# Patient Record
Sex: MALE | Race: ASIAN | NOT HISPANIC OR LATINO | Employment: OTHER | URBAN - METROPOLITAN AREA
[De-identification: names, ages, dates, MRNs, and addresses within clinical notes are randomized per-mention and may not be internally consistent; named-entity substitution may affect disease eponyms.]

---

## 2018-01-03 ENCOUNTER — HOSPITAL ENCOUNTER (INPATIENT)
Facility: HOSPITAL | Age: 78
LOS: 1 days | Discharge: HOME HEALTH | DRG: 308 | End: 2018-01-05
Attending: EMERGENCY MEDICINE | Admitting: HOSPITALIST
Payer: MEDICARE

## 2018-01-03 DIAGNOSIS — J44.1 COPD EXACERBATION: ICD-10-CM

## 2018-01-03 DIAGNOSIS — J45.41 ASTHMA EXACERBATION, NON-ALLERGIC, MODERATE PERSISTENT: ICD-10-CM

## 2018-01-03 DIAGNOSIS — R07.9 CHEST PAIN: ICD-10-CM

## 2018-01-03 DIAGNOSIS — I48.91 ATRIAL FIBRILLATION AND FLUTTER: ICD-10-CM

## 2018-01-03 DIAGNOSIS — I48.91 ATRIAL FIBRILLATION WITH RAPID VENTRICULAR RESPONSE: ICD-10-CM

## 2018-01-03 DIAGNOSIS — R06.02 SOB (SHORTNESS OF BREATH): ICD-10-CM

## 2018-01-03 DIAGNOSIS — J96.90 RESPIRATORY FAILURE: ICD-10-CM

## 2018-01-03 DIAGNOSIS — I48.92 ATRIAL FIBRILLATION AND FLUTTER: ICD-10-CM

## 2018-01-03 DIAGNOSIS — R09.02 HYPOXIA: ICD-10-CM

## 2018-01-03 DIAGNOSIS — J20.9 ACUTE BRONCHITIS, UNSPECIFIED ORGANISM: Primary | ICD-10-CM

## 2018-01-03 LAB
ALBUMIN SERPL BCP-MCNC: 3.1 G/DL
ALP SERPL-CCNC: 100 U/L
ALT SERPL W/O P-5'-P-CCNC: 15 U/L
ANION GAP SERPL CALC-SCNC: 12 MMOL/L
AST SERPL-CCNC: 24 U/L
BASOPHILS # BLD AUTO: 0.01 K/UL
BASOPHILS NFR BLD: 0.1 %
BILIRUB SERPL-MCNC: 0.4 MG/DL
BUN SERPL-MCNC: 25 MG/DL
CALCIUM SERPL-MCNC: 8.8 MG/DL
CHLORIDE SERPL-SCNC: 109 MMOL/L
CK MB SERPL-MCNC: 1.2 NG/ML
CK MB SERPL-RTO: 2.7 %
CK SERPL-CCNC: 44 U/L
CK SERPL-CCNC: 44 U/L
CO2 SERPL-SCNC: 24 MMOL/L
CREAT SERPL-MCNC: 1.5 MG/DL
DIFFERENTIAL METHOD: ABNORMAL
EOSINOPHIL # BLD AUTO: 0.1 K/UL
EOSINOPHIL NFR BLD: 0.9 %
ERYTHROCYTE [DISTWIDTH] IN BLOOD BY AUTOMATED COUNT: 13 %
EST. GFR  (AFRICAN AMERICAN): 52 ML/MIN/1.73 M^2
EST. GFR  (NON AFRICAN AMERICAN): 45 ML/MIN/1.73 M^2
GLUCOSE SERPL-MCNC: 100 MG/DL
HCT VFR BLD AUTO: 40.6 %
HGB BLD-MCNC: 13.6 G/DL
LYMPHOCYTES # BLD AUTO: 1.9 K/UL
LYMPHOCYTES NFR BLD: 27.8 %
MAGNESIUM SERPL-MCNC: 1.9 MG/DL
MCH RBC QN AUTO: 30.4 PG
MCHC RBC AUTO-ENTMCNC: 33.5 G/DL
MCV RBC AUTO: 91 FL
MONOCYTES # BLD AUTO: 1 K/UL
MONOCYTES NFR BLD: 13.6 %
NEUTROPHILS # BLD AUTO: 4 K/UL
NEUTROPHILS NFR BLD: 57.6 %
PLATELET # BLD AUTO: 179 K/UL
PMV BLD AUTO: 9.7 FL
POTASSIUM SERPL-SCNC: 4.3 MMOL/L
PROT SERPL-MCNC: 7.3 G/DL
RBC # BLD AUTO: 4.47 M/UL
SODIUM SERPL-SCNC: 145 MMOL/L
TROPONIN I SERPL DL<=0.01 NG/ML-MCNC: <0.006 NG/ML
WBC # BLD AUTO: 6.97 K/UL

## 2018-01-03 PROCEDURE — 94640 AIRWAY INHALATION TREATMENT: CPT

## 2018-01-03 PROCEDURE — 82553 CREATINE MB FRACTION: CPT

## 2018-01-03 PROCEDURE — 25000242 PHARM REV CODE 250 ALT 637 W/ HCPCS: Performed by: EMERGENCY MEDICINE

## 2018-01-03 PROCEDURE — 96365 THER/PROPH/DIAG IV INF INIT: CPT

## 2018-01-03 PROCEDURE — 85025 COMPLETE CBC W/AUTO DIFF WBC: CPT

## 2018-01-03 PROCEDURE — 80053 COMPREHEN METABOLIC PANEL: CPT

## 2018-01-03 PROCEDURE — 96366 THER/PROPH/DIAG IV INF ADDON: CPT

## 2018-01-03 PROCEDURE — 93005 ELECTROCARDIOGRAM TRACING: CPT

## 2018-01-03 PROCEDURE — 83735 ASSAY OF MAGNESIUM: CPT

## 2018-01-03 PROCEDURE — 84484 ASSAY OF TROPONIN QUANT: CPT

## 2018-01-03 PROCEDURE — 96375 TX/PRO/DX INJ NEW DRUG ADDON: CPT

## 2018-01-03 PROCEDURE — 99291 CRITICAL CARE FIRST HOUR: CPT | Mod: 25

## 2018-01-03 PROCEDURE — 25000003 PHARM REV CODE 250: Performed by: EMERGENCY MEDICINE

## 2018-01-03 PROCEDURE — 93010 ELECTROCARDIOGRAM REPORT: CPT | Mod: ,,, | Performed by: INTERNAL MEDICINE

## 2018-01-03 PROCEDURE — 63600175 PHARM REV CODE 636 W HCPCS: Performed by: EMERGENCY MEDICINE

## 2018-01-03 PROCEDURE — 96361 HYDRATE IV INFUSION ADD-ON: CPT

## 2018-01-03 PROCEDURE — 96376 TX/PRO/DX INJ SAME DRUG ADON: CPT

## 2018-01-03 PROCEDURE — 96367 TX/PROPH/DG ADDL SEQ IV INF: CPT | Mod: 59

## 2018-01-03 RX ORDER — HYDROCODONE POLISTIREX AND CHLORPHENIRAMINE POLISTIREX 10; 8 MG/5ML; MG/5ML
5 SUSPENSION, EXTENDED RELEASE ORAL EVERY 12 HOURS PRN
COMMUNITY
End: 2019-05-07

## 2018-01-03 RX ORDER — IPRATROPIUM BROMIDE AND ALBUTEROL SULFATE 2.5; .5 MG/3ML; MG/3ML
3 SOLUTION RESPIRATORY (INHALATION)
Status: DISCONTINUED | OUTPATIENT
Start: 2018-01-03 | End: 2018-01-03

## 2018-01-03 RX ORDER — MELOXICAM 7.5 MG/1
7.5 TABLET ORAL DAILY
Status: ON HOLD | COMMUNITY
End: 2018-01-05 | Stop reason: HOSPADM

## 2018-01-03 RX ORDER — IPRATROPIUM BROMIDE AND ALBUTEROL SULFATE 2.5; .5 MG/3ML; MG/3ML
3 SOLUTION RESPIRATORY (INHALATION)
Status: COMPLETED | OUTPATIENT
Start: 2018-01-03 | End: 2018-01-03

## 2018-01-03 RX ORDER — DILTIAZEM HYDROCHLORIDE 5 MG/ML
10 INJECTION INTRAVENOUS
Status: COMPLETED | OUTPATIENT
Start: 2018-01-04 | End: 2018-01-03

## 2018-01-03 RX ORDER — METHYLPREDNISOLONE SOD SUCC 125 MG
125 VIAL (EA) INJECTION
Status: COMPLETED | OUTPATIENT
Start: 2018-01-03 | End: 2018-01-03

## 2018-01-03 RX ADMIN — DILTIAZEM HYDROCHLORIDE 10 MG: 5 INJECTION INTRAVENOUS at 11:01

## 2018-01-03 RX ADMIN — IPRATROPIUM BROMIDE AND ALBUTEROL SULFATE 3 ML: .5; 3 SOLUTION RESPIRATORY (INHALATION) at 09:01

## 2018-01-03 RX ADMIN — IPRATROPIUM BROMIDE AND ALBUTEROL SULFATE 3 ML: .5; 3 SOLUTION RESPIRATORY (INHALATION) at 10:01

## 2018-01-03 RX ADMIN — METHYLPREDNISOLONE SODIUM SUCCINATE 125 MG: 125 INJECTION, POWDER, FOR SOLUTION INTRAMUSCULAR; INTRAVENOUS at 11:01

## 2018-01-03 RX ADMIN — SODIUM CHLORIDE 1000 ML: 0.9 INJECTION, SOLUTION INTRAVENOUS at 11:01

## 2018-01-04 PROBLEM — J44.1 COPD EXACERBATION: Status: ACTIVE | Noted: 2018-01-04

## 2018-01-04 PROBLEM — I48.92 PAROXYSMAL ATRIAL FLUTTER: Status: ACTIVE | Noted: 2018-01-04

## 2018-01-04 PROBLEM — J45.41 ASTHMA EXACERBATION, NON-ALLERGIC, MODERATE PERSISTENT: Status: ACTIVE | Noted: 2018-01-04

## 2018-01-04 PROBLEM — J96.90 RESPIRATORY FAILURE: Status: ACTIVE | Noted: 2018-01-04

## 2018-01-04 PROBLEM — N17.9 ACUTE RENAL FAILURE: Status: ACTIVE | Noted: 2018-01-04

## 2018-01-04 PROBLEM — J20.9 ACUTE BRONCHITIS: Status: ACTIVE | Noted: 2018-01-04

## 2018-01-04 LAB
ALBUMIN SERPL BCP-MCNC: 2.8 G/DL
ALLENS TEST: ABNORMAL
ANION GAP SERPL CALC-SCNC: 11 MMOL/L
BASOPHILS # BLD AUTO: 0 K/UL
BASOPHILS NFR BLD: 0 %
BILIRUB UR QL STRIP: NEGATIVE
BUN SERPL-MCNC: 23 MG/DL
CALCIUM SERPL-MCNC: 8.4 MG/DL
CHLORIDE SERPL-SCNC: 110 MMOL/L
CLARITY UR: CLEAR
CO2 SERPL-SCNC: 21 MMOL/L
COLOR UR: YELLOW
CREAT SERPL-MCNC: 1.5 MG/DL
DELSYS: ABNORMAL
DIASTOLIC DYSFUNCTION: NO
DIFFERENTIAL METHOD: ABNORMAL
EOSINOPHIL # BLD AUTO: 0 K/UL
EOSINOPHIL NFR BLD: 0 %
EP: 8
ERYTHROCYTE [DISTWIDTH] IN BLOOD BY AUTOMATED COUNT: 13 %
ERYTHROCYTE [SEDIMENTATION RATE] IN BLOOD BY WESTERGREN METHOD: 14 MM/H
EST. GFR  (AFRICAN AMERICAN): 52 ML/MIN/1.73 M^2
EST. GFR  (NON AFRICAN AMERICAN): 45 ML/MIN/1.73 M^2
ESTIMATED AVG GLUCOSE: 105 MG/DL
FIO2: 50
GLOBAL PERICARDIAL EFFUSION: NORMAL
GLUCOSE SERPL-MCNC: 180 MG/DL
GLUCOSE UR QL STRIP: NEGATIVE
HBA1C MFR BLD HPLC: 5.3 %
HCO3 UR-SCNC: 24.3 MMOL/L (ref 24–28)
HCT VFR BLD AUTO: 38.4 %
HGB BLD-MCNC: 12.8 G/DL
HGB UR QL STRIP: NEGATIVE
INR PPP: 1
IP: 16
KETONES UR QL STRIP: NEGATIVE
LEUKOCYTE ESTERASE UR QL STRIP: NEGATIVE
LYMPHOCYTES # BLD AUTO: 0.4 K/UL
LYMPHOCYTES NFR BLD: 6.8 %
MAGNESIUM SERPL-MCNC: 2.4 MG/DL
MCH RBC QN AUTO: 30.2 PG
MCHC RBC AUTO-ENTMCNC: 33.3 G/DL
MCV RBC AUTO: 91 FL
MITRAL VALVE REGURGITATION: NORMAL
MODE: ABNORMAL
MONOCYTES # BLD AUTO: 0.1 K/UL
MONOCYTES NFR BLD: 1 %
NEUTROPHILS # BLD AUTO: 5.4 K/UL
NEUTROPHILS NFR BLD: 92.2 %
NITRITE UR QL STRIP: NEGATIVE
PCO2 BLDA: 44.5 MMHG (ref 35–45)
PH SMN: 7.34 [PH] (ref 7.35–7.45)
PH UR STRIP: 6 [PH] (ref 5–8)
PHOSPHATE SERPL-MCNC: 2.3 MG/DL
PHOSPHATE SERPL-MCNC: 2.3 MG/DL
PLATELET # BLD AUTO: 155 K/UL
PMV BLD AUTO: 9.7 FL
PO2 BLDA: 189 MMHG (ref 80–100)
POC BE: -1 MMOL/L
POC SATURATED O2: 100 % (ref 95–100)
POCT GLUCOSE: 210 MG/DL (ref 70–110)
POTASSIUM SERPL-SCNC: 4.5 MMOL/L
PROT UR QL STRIP: NEGATIVE
PROTHROMBIN TIME: 10.7 SEC
RBC # BLD AUTO: 4.24 M/UL
RETIRED EF AND QEF - SEE NOTES: 60 (ref 55–65)
SAMPLE: ABNORMAL
SITE: ABNORMAL
SODIUM SERPL-SCNC: 142 MMOL/L
SP GR UR STRIP: 1.01 (ref 1–1.03)
T4 FREE SERPL-MCNC: 1.12 NG/DL
TSH SERPL DL<=0.005 MIU/L-ACNC: 0.08 UIU/ML
URN SPEC COLLECT METH UR: NORMAL
UROBILINOGEN UR STRIP-ACNC: NEGATIVE EU/DL
WBC # BLD AUTO: 5.89 K/UL

## 2018-01-04 PROCEDURE — 27000190 HC CPAP FULL FACE MASK W/VALVE

## 2018-01-04 PROCEDURE — 84443 ASSAY THYROID STIM HORMONE: CPT

## 2018-01-04 PROCEDURE — 36600 WITHDRAWAL OF ARTERIAL BLOOD: CPT

## 2018-01-04 PROCEDURE — 63600175 PHARM REV CODE 636 W HCPCS: Performed by: EMERGENCY MEDICINE

## 2018-01-04 PROCEDURE — 63600175 PHARM REV CODE 636 W HCPCS: Performed by: HOSPITALIST

## 2018-01-04 PROCEDURE — 27000221 HC OXYGEN, UP TO 24 HOURS

## 2018-01-04 PROCEDURE — 94660 CPAP INITIATION&MGMT: CPT

## 2018-01-04 PROCEDURE — 99223 1ST HOSP IP/OBS HIGH 75: CPT | Mod: ,,, | Performed by: INTERNAL MEDICINE

## 2018-01-04 PROCEDURE — 83735 ASSAY OF MAGNESIUM: CPT

## 2018-01-04 PROCEDURE — 85610 PROTHROMBIN TIME: CPT

## 2018-01-04 PROCEDURE — 25000003 PHARM REV CODE 250: Performed by: NURSE PRACTITIONER

## 2018-01-04 PROCEDURE — 21400001 HC TELEMETRY ROOM

## 2018-01-04 PROCEDURE — 93306 TTE W/DOPPLER COMPLETE: CPT | Mod: 26,,, | Performed by: INTERNAL MEDICINE

## 2018-01-04 PROCEDURE — 83036 HEMOGLOBIN GLYCOSYLATED A1C: CPT

## 2018-01-04 PROCEDURE — 80069 RENAL FUNCTION PANEL: CPT

## 2018-01-04 PROCEDURE — 94640 AIRWAY INHALATION TREATMENT: CPT

## 2018-01-04 PROCEDURE — 99900035 HC TECH TIME PER 15 MIN (STAT)

## 2018-01-04 PROCEDURE — 25000003 PHARM REV CODE 250: Performed by: HOSPITALIST

## 2018-01-04 PROCEDURE — 84439 ASSAY OF FREE THYROXINE: CPT

## 2018-01-04 PROCEDURE — 93306 TTE W/DOPPLER COMPLETE: CPT

## 2018-01-04 PROCEDURE — 82803 BLOOD GASES ANY COMBINATION: CPT

## 2018-01-04 PROCEDURE — 81003 URINALYSIS AUTO W/O SCOPE: CPT

## 2018-01-04 PROCEDURE — 85025 COMPLETE CBC W/AUTO DIFF WBC: CPT

## 2018-01-04 PROCEDURE — 25000003 PHARM REV CODE 250: Performed by: EMERGENCY MEDICINE

## 2018-01-04 PROCEDURE — 94761 N-INVAS EAR/PLS OXIMETRY MLT: CPT

## 2018-01-04 PROCEDURE — 25000242 PHARM REV CODE 250 ALT 637 W/ HCPCS: Performed by: EMERGENCY MEDICINE

## 2018-01-04 RX ORDER — IBUPROFEN 200 MG
24 TABLET ORAL
Status: DISCONTINUED | OUTPATIENT
Start: 2018-01-04 | End: 2018-01-05 | Stop reason: HOSPADM

## 2018-01-04 RX ORDER — GLUCAGON 1 MG
1 KIT INJECTION
Status: DISCONTINUED | OUTPATIENT
Start: 2018-01-04 | End: 2018-01-05 | Stop reason: HOSPADM

## 2018-01-04 RX ORDER — MOXIFLOXACIN HYDROCHLORIDE 400 MG/1
400 TABLET ORAL
Status: DISCONTINUED | OUTPATIENT
Start: 2018-01-05 | End: 2018-01-05 | Stop reason: HOSPADM

## 2018-01-04 RX ORDER — MAGNESIUM SULFATE HEPTAHYDRATE 40 MG/ML
2 INJECTION, SOLUTION INTRAVENOUS ONCE
Status: COMPLETED | OUTPATIENT
Start: 2018-01-04 | End: 2018-01-04

## 2018-01-04 RX ORDER — SYRING-NEEDL,DISP,INSUL,0.3 ML 29 G X1/2"
296 SYRINGE, EMPTY DISPOSABLE MISCELLANEOUS ONCE
Status: COMPLETED | OUTPATIENT
Start: 2018-01-04 | End: 2018-01-04

## 2018-01-04 RX ORDER — ASPIRIN 81 MG/1
81 TABLET ORAL DAILY
Status: DISCONTINUED | OUTPATIENT
Start: 2018-01-04 | End: 2018-01-05 | Stop reason: HOSPADM

## 2018-01-04 RX ORDER — TAMSULOSIN HYDROCHLORIDE 0.4 MG/1
0.4 CAPSULE ORAL DAILY
Status: DISCONTINUED | OUTPATIENT
Start: 2018-01-04 | End: 2018-01-05 | Stop reason: HOSPADM

## 2018-01-04 RX ORDER — IPRATROPIUM BROMIDE AND ALBUTEROL SULFATE 2.5; .5 MG/3ML; MG/3ML
3 SOLUTION RESPIRATORY (INHALATION) EVERY 4 HOURS
Status: DISCONTINUED | OUTPATIENT
Start: 2018-01-04 | End: 2018-01-04

## 2018-01-04 RX ORDER — PREDNISONE 20 MG/1
60 TABLET ORAL DAILY
Status: DISCONTINUED | OUTPATIENT
Start: 2018-01-05 | End: 2018-01-05 | Stop reason: HOSPADM

## 2018-01-04 RX ORDER — METHYLPREDNISOLONE SOD SUCC 125 MG
80 VIAL (EA) INJECTION EVERY 8 HOURS
Status: DISCONTINUED | OUTPATIENT
Start: 2018-01-04 | End: 2018-01-04

## 2018-01-04 RX ORDER — IBUPROFEN 200 MG
16 TABLET ORAL
Status: DISCONTINUED | OUTPATIENT
Start: 2018-01-04 | End: 2018-01-05 | Stop reason: HOSPADM

## 2018-01-04 RX ORDER — INSULIN ASPART 100 [IU]/ML
0-5 INJECTION, SOLUTION INTRAVENOUS; SUBCUTANEOUS
Status: DISCONTINUED | OUTPATIENT
Start: 2018-01-04 | End: 2018-01-05 | Stop reason: HOSPADM

## 2018-01-04 RX ORDER — LOVASTATIN 10 MG/1
10 TABLET ORAL NIGHTLY
Status: DISCONTINUED | OUTPATIENT
Start: 2018-01-04 | End: 2018-01-05 | Stop reason: HOSPADM

## 2018-01-04 RX ORDER — DILTIAZEM HCL/D5W 125 MG/125
5 PLASTIC BAG, INJECTION (ML) INTRAVENOUS CONTINUOUS
Status: DISCONTINUED | OUTPATIENT
Start: 2018-01-04 | End: 2018-01-04

## 2018-01-04 RX ORDER — ENOXAPARIN SODIUM 100 MG/ML
40 INJECTION SUBCUTANEOUS EVERY 24 HOURS
Status: DISCONTINUED | OUTPATIENT
Start: 2018-01-04 | End: 2018-01-04

## 2018-01-04 RX ORDER — IPRATROPIUM BROMIDE 0.5 MG/2.5ML
0.5 SOLUTION RESPIRATORY (INHALATION) EVERY 6 HOURS
Status: DISCONTINUED | OUTPATIENT
Start: 2018-01-05 | End: 2018-01-05 | Stop reason: HOSPADM

## 2018-01-04 RX ORDER — DILTIAZEM HYDROCHLORIDE 120 MG/1
120 CAPSULE, COATED, EXTENDED RELEASE ORAL DAILY
Status: DISCONTINUED | OUTPATIENT
Start: 2018-01-04 | End: 2018-01-05 | Stop reason: HOSPADM

## 2018-01-04 RX ORDER — SODIUM CHLORIDE 9 MG/ML
INJECTION, SOLUTION INTRAVENOUS CONTINUOUS
Status: DISCONTINUED | OUTPATIENT
Start: 2018-01-04 | End: 2018-01-04

## 2018-01-04 RX ORDER — PANTOPRAZOLE SODIUM 40 MG/1
40 TABLET, DELAYED RELEASE ORAL DAILY
Status: DISCONTINUED | OUTPATIENT
Start: 2018-01-04 | End: 2018-01-05 | Stop reason: HOSPADM

## 2018-01-04 RX ORDER — SODIUM CHLORIDE 0.9 % (FLUSH) 0.9 %
5 SYRINGE (ML) INJECTION
Status: DISCONTINUED | OUTPATIENT
Start: 2018-01-04 | End: 2018-01-05 | Stop reason: HOSPADM

## 2018-01-04 RX ORDER — BISACODYL 5 MG
5 TABLET, DELAYED RELEASE (ENTERIC COATED) ORAL DAILY PRN
Status: DISCONTINUED | OUTPATIENT
Start: 2018-01-04 | End: 2018-01-05 | Stop reason: HOSPADM

## 2018-01-04 RX ORDER — CARVEDILOL 3.12 MG/1
3.12 TABLET ORAL 2 TIMES DAILY
Status: DISCONTINUED | OUTPATIENT
Start: 2018-01-04 | End: 2018-01-05 | Stop reason: HOSPADM

## 2018-01-04 RX ADMIN — METHYLPREDNISOLONE SODIUM SUCCINATE 80 MG: 125 INJECTION, POWDER, FOR SOLUTION INTRAMUSCULAR; INTRAVENOUS at 09:01

## 2018-01-04 RX ADMIN — SODIUM CHLORIDE: 0.9 INJECTION, SOLUTION INTRAVENOUS at 08:01

## 2018-01-04 RX ADMIN — SODIUM CHLORIDE: 0.9 INJECTION, SOLUTION INTRAVENOUS at 01:01

## 2018-01-04 RX ADMIN — IPRATROPIUM BROMIDE AND ALBUTEROL SULFATE 3 ML: .5; 3 SOLUTION RESPIRATORY (INHALATION) at 04:01

## 2018-01-04 RX ADMIN — MOXIFLOXACIN HYDROCHLORIDE 400 MG: 400 INJECTION, SOLUTION INTRAVENOUS at 01:01

## 2018-01-04 RX ADMIN — CARVEDILOL 3.12 MG: 3.12 TABLET, FILM COATED ORAL at 09:01

## 2018-01-04 RX ADMIN — MAGNESIUM CITRATE 296 ML: 1.75 LIQUID ORAL at 08:01

## 2018-01-04 RX ADMIN — INSULIN ASPART 1 UNITS: 100 INJECTION, SOLUTION INTRAVENOUS; SUBCUTANEOUS at 10:01

## 2018-01-04 RX ADMIN — IPRATROPIUM BROMIDE AND ALBUTEROL SULFATE 3 ML: .5; 3 SOLUTION RESPIRATORY (INHALATION) at 07:01

## 2018-01-04 RX ADMIN — ASPIRIN 81 MG: 81 TABLET, COATED ORAL at 08:01

## 2018-01-04 RX ADMIN — METHYLPREDNISOLONE SODIUM SUCCINATE 80 MG: 125 INJECTION, POWDER, FOR SOLUTION INTRAMUSCULAR; INTRAVENOUS at 05:01

## 2018-01-04 RX ADMIN — Medication 5 MG/HR: at 01:01

## 2018-01-04 RX ADMIN — IPRATROPIUM BROMIDE AND ALBUTEROL SULFATE 3 ML: .5; 3 SOLUTION RESPIRATORY (INHALATION) at 11:01

## 2018-01-04 RX ADMIN — DILTIAZEM HYDROCHLORIDE 120 MG: 120 CAPSULE, COATED, EXTENDED RELEASE ORAL at 08:01

## 2018-01-04 RX ADMIN — MAGNESIUM SULFATE IN WATER 2 G: 40 INJECTION, SOLUTION INTRAVENOUS at 03:01

## 2018-01-04 RX ADMIN — IPRATROPIUM BROMIDE AND ALBUTEROL SULFATE 3 ML: .5; 3 SOLUTION RESPIRATORY (INHALATION) at 03:01

## 2018-01-04 RX ADMIN — APIXABAN 2.5 MG: 2.5 TABLET, FILM COATED ORAL at 09:01

## 2018-01-04 RX ADMIN — CARVEDILOL 3.12 MG: 3.12 TABLET, FILM COATED ORAL at 08:01

## 2018-01-04 RX ADMIN — METHYLPREDNISOLONE SODIUM SUCCINATE 80 MG: 125 INJECTION, POWDER, FOR SOLUTION INTRAMUSCULAR; INTRAVENOUS at 01:01

## 2018-01-04 RX ADMIN — PANTOPRAZOLE SODIUM 40 MG: 40 TABLET, DELAYED RELEASE ORAL at 08:01

## 2018-01-04 RX ADMIN — TAMSULOSIN HYDROCHLORIDE 0.4 MG: 0.4 CAPSULE ORAL at 08:01

## 2018-01-04 RX ADMIN — LOVASTATIN 10 MG: 10 TABLET ORAL at 05:01

## 2018-01-04 NOTE — HOSPITAL COURSE
The pt was admitted for Asthma exacerbation and Aflutter.He was initially admitted to ICU on Bipap. Patient was given IV steroids, abx, PO,  and breathing tx. Breathing improved and pt was transitioned from bipap to NC. He qualified for home oxygen which was arranged. He will be discharged on prednisone taper, Avelox, and a home nebulizer with neb txs. He will follow up with Pulmonology.   Aflutter converted to NSR. Echo showed Normal LVF.  The pt was continued on Diltiazem and Coreg. CHADvasc 4. The pt was placed on Eliquis. He will follow up with Cardiology.   Pt reported constipation. Abd Xray showed ileus versus stool impaction. The pt was given enemas and placed on Miralax. Pt had large BMs. Repeat Abd Xray showed resolution of fecal impaction.  The pt was seen and examined today and determined to be stable for discharge.

## 2018-01-04 NOTE — ASSESSMENT & PLAN NOTE
- likely prerenal from decreased renal perfusion  - check UA  - strict I/O  - restart finasteride

## 2018-01-04 NOTE — ASSESSMENT & PLAN NOTE
- patient and family reports no history of afib/aflutter, however, patient is on diltiazem  - restart diltiazem at home dose  - check tsh  - CHADvasc 4, will need to be on anticoagulation  - check echo to rule out valvular disease, if negative then will start on apixaban  - start asa for now

## 2018-01-04 NOTE — ASSESSMENT & PLAN NOTE
- takes enalapril 2.5mg daily and diltiazem 120mg daily at home.  - hold enalapril for now due to ARF  - restart diltiazem, would also be beneficial in the setting of atrial flutter/Afib

## 2018-01-04 NOTE — ASSESSMENT & PLAN NOTE
- exacerbated by URI  - severe exacerbation requiring close monitoring in ICU  - was given solumedrol 125mg bolus and duonebs x 3 in ER  - continue solumedrol 80mg q8h, duonebs q4h, moxifloxacin  - give magnesium infusion 2 g over 20 min  - check abg  - titrate bipap for goal O2 sat >93%

## 2018-01-04 NOTE — ED NOTES
Daughter states pt has been congested and having cold symptoms for 2 weeks. Pt was seen at PCP on 17 and given Z-daphney with Tussinex.  Pt has completed Z-daphney but cont with cold and productive cough.  States had fever at home.   Armband checked, patient verified. Verified by spelling and stated name on armband along with .   LOC: The patient is awake, alert and aware of environment with an appropriate affect, the patient is oriented x 3. Pt speaks [place] - daughter at bedside for interpreting.    APPEARANCE: Patient in acute distress, patient is clean and well groomed  SKIN: The skin is warm and dry, color pale, patient has normal skin turgor and moist mucus membranes, no breakdown or bruising noted. Pt is extremely thin.   MUSCULOSKELETAL: Patient moving all extremities to command  RESPIRATORY: Airway is open and patent, respirations are rapid, shallow and labored. Pt was on CPAP per EMS with CO2 40%.  Pt placed on O2 per NC 4L/NC - resp at bedside.    CARDIAC: Patient has a normal rate, no periphreal edema noted, capillary refill < 3 seconds.  ABDOMEN: Soft and non tender to palpation.

## 2018-01-04 NOTE — HPI
78M h/o asthma presents with cough and sob.  The patient reports cough started after coming home from recent travel. He presented to his PCP who started the patient on zpack.  Cough never completely resolved.     His sob acutely worsened today which alerted the patient's daughter to bring the patient to the hospital.  He uses albuterol inhaler twice a day and has not been increasing the usage of inhaler.   Denies sick contacts.  Does report recent travel out of town.  No other exacerbating or alleviating factors.     Pertinent ER course  Patient was found to by mildly tachycardic in ER with O2 sat 98% on NC .  He was given 3 albuterol treatments and improved respiratory status.  However, patient went into aflutter/afib when he was using the restroom.  EKG showed aflutter. Flutter rate>250.  O2 found to be 89% at that time.  He was placed on Bipap and given IV solumedrol 125mg bolus. He was also bolused with diltiazem and placed on diltiazem gtt.  After about 40 minutes, the patient converted to NSR .  He was able to be titrated off of diltiazem gtt.

## 2018-01-04 NOTE — SUBJECTIVE & OBJECTIVE
Past Medical History:   Diagnosis Date    Asthma     Diabetes mellitus     Hypertension     Tuberculosis 2002    Treated       History reviewed. No pertinent surgical history.    Review of patient's allergies indicates:  No Known Allergies    No current facility-administered medications on file prior to encounter.      Current Outpatient Prescriptions on File Prior to Encounter   Medication Sig    albuterol 90 mcg/actuation inhaler Inhale 1-2 puffs into the lungs every 6 (six) hours as needed for Wheezing.    carvedilol (COREG) 3.125 MG tablet Take 1 tablet (3.125 mg total) by mouth 2 (two) times daily with meals.    diltiazem (CARDIZEM CD) 120 MG Cp24 Take 1 capsule (120 mg total) by mouth once daily. Call PCP for refills    enalapril (VASOTEC) 2.5 MG tablet Take 2.5 mg by mouth once daily.    finasteride (PROSCAR) 5 mg tablet Take 1 tablet (5 mg total) by mouth once daily. Call PCP for refills    levocetirizine (XYZAL) 5 MG tablet Take 5 mg by mouth every evening.    lovastatin (MEVACOR) 10 MG tablet Take 10 mg by mouth every evening.    metformin (GLUCOPHAGE) 500 MG tablet Take 500 mg by mouth 2 (two) times daily with meals.    tamsulosin (FLOMAX) 0.4 mg Cp24 Take 1 capsule (0.4 mg total) by mouth once daily. Call PCP for refills    methylPREDNISolone (MEDROL DOSEPACK) 4 mg tablet As directed.     Family History     Family history is unknown by patient.        Social History Main Topics    Smoking status: Former Smoker     Quit date: 1997    Smokeless tobacco: Never Used    Alcohol use No    Drug use: No    Sexual activity: No     Review of Systems   Constitutional: Negative for activity change, appetite change, chills, diaphoresis, fatigue and fever.   HENT: Negative for facial swelling, sore throat, tinnitus and trouble swallowing.    Eyes: Negative for photophobia and visual disturbance.   Respiratory: Positive for cough and shortness of breath. Negative for apnea, chest tightness and  wheezing.    Cardiovascular: Negative for chest pain, palpitations and leg swelling.   Gastrointestinal: Negative for abdominal distention, abdominal pain, constipation, diarrhea, nausea and vomiting.   Endocrine: Negative for polydipsia, polyphagia and polyuria.   Genitourinary: Negative for decreased urine volume, dysuria, flank pain, frequency and hematuria.   Musculoskeletal: Negative for arthralgias, back pain, joint swelling, myalgias and neck stiffness.   Skin: Negative for pallor and rash.   Allergic/Immunologic: Negative for immunocompromised state.   Neurological: Negative for dizziness, seizures, syncope, weakness, numbness and headaches.   Psychiatric/Behavioral: Negative for confusion, hallucinations and suicidal ideas. The patient is not nervous/anxious.    All other systems reviewed and are negative.    Objective:     Vital Signs (Most Recent):  Temp: 97.6 °F (36.4 °C) (01/03/18 2135)  Pulse: (!) 141 (01/04/18 0110)  Resp: 17 (01/04/18 0110)  BP: (!) 141/95 (01/04/18 0110)  SpO2: 97 % (01/04/18 0110) Vital Signs (24h Range):  Temp:  [97.6 °F (36.4 °C)] 97.6 °F (36.4 °C)  Pulse:  [] 141  Resp:  [13-23] 17  SpO2:  [89 %-99 %] 97 %  BP: (101-141)/(59-95) 141/95     Weight: 50.9 kg (112 lb 3.4 oz)  Body mass index is 19.88 kg/m².    Physical Exam   Constitutional: He is oriented to person, place, and time. He appears well-developed and well-nourished. No distress.   HENT:   Head: Normocephalic and atraumatic.   Mouth/Throat: Oropharynx is clear and moist.   Eyes: Conjunctivae are normal. Pupils are equal, round, and reactive to light. No scleral icterus.   Neck: No JVD present. No thyromegaly present.   Cardiovascular: Normal rate and regular rhythm.  Exam reveals no gallop and no friction rub.    No murmur heard.  Pulmonary/Chest: No respiratory distress. He has no wheezes. He has no rales.   Coarse breath sounds bilaterally    Abdominal: Soft. Bowel sounds are normal. He exhibits no distension.  There is no tenderness. There is no guarding.   Musculoskeletal: Normal range of motion.   Neurological: He is alert and oriented to person, place, and time. No cranial nerve deficit.   Skin: Skin is warm. Capillary refill takes more than 3 seconds. He is not diaphoretic. No erythema.   Psychiatric: He has a normal mood and affect.   Nursing note and vitals reviewed.        CRANIAL NERVES     CN III, IV, VI   Pupils are equal, round, and reactive to light.       Significant Labs:   BMP:   Recent Labs  Lab 01/03/18  2220         K 4.3      CO2 24   BUN 25*   CREATININE 1.5*   CALCIUM 8.8   MG 1.9     CBC:   Recent Labs  Lab 01/03/18  2220   WBC 6.97   HGB 13.6*   HCT 40.6        Magnesium:   Recent Labs  Lab 01/03/18  2220   MG 1.9     TSH: No results for input(s): TSH in the last 4320 hours.  All pertinent labs within the past 24 hours have been reviewed.    Significant Imaging: I have reviewed and interpreted all pertinent imaging results/findings within the past 24 hours.   Imaging Results          X-Ray Chest PA And Lateral (Final result)  Result time 01/04/18 00:17:11    Final result by Cruzito Finnegan MD (01/04/18 00:17:11)                 Impression:     Stable abnormal chest x-ray.      Electronically signed by: CRUZITO FINNEGAN  Date:     01/04/18  Time:    00:17              Narrative:    Chest x-ray 2 view    Indication: Shortness of breath.    Findings: Comparison study 5/30/2016.  No change.  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  There is scattered mild scarring elsewhere.  No active infiltrate or pleural fluid collection.  Normal size heart.

## 2018-01-04 NOTE — PLAN OF CARE
Problem: Patient Care Overview  Goal: Plan of Care Review  Outcome: Ongoing (interventions implemented as appropriate)  Pt continues to be on Bipap. No significant changes to note at this time. Will try to wean off of Bipap if patient can tolerate.

## 2018-01-04 NOTE — ASSESSMENT & PLAN NOTE
- exacerbated by URI  - severe exacerbation requiring close monitoring in ICU  - was given solumedrol 125mg bolus and duonebs x 3 in ER  - continue solumedrol 80mg q8h, duonebs q4h, moxifloxacin  - give magnesium infusion 2 g over 20 min  - check abg  - titrate bipap for goal O2 sat >93%    1/4: transitioned to NC @3L/MIN via NC O2 sat 99%  --transferred from ICU to telemetry

## 2018-01-04 NOTE — ED PROVIDER NOTES
SCRIBE #1 NOTE: I, Tova Shweta, am scribing for, and in the presence of, Noah Cordero MD. I have scribed the entire note.      History      Chief Complaint   Patient presents with    Shortness of Breath       Review of patient's allergies indicates:  No Known Allergies     HPI   HPI    1/3/2018, 10:45 PM   History obtained from the patient's daughter      History of Present Illness: Baltazar Garcia is a 78 y.o. male patient who has a hx of asthma presents to the Emergency Department for SOB which onset gradually tonight. Symptoms are intermittent and moderate in severity. Pt's daughter reports that he went out of town, came back, and got a cold and was coughing on Dec. 26th. He saw his PCP on Dec.27th. Pt was given abx and a z-pack. Pt feels worse tonight. No mitigating or exacerbating factors reported. No other associated sxs reported. Patient denies any long car trips, fever, leg pain/swelling, CP, N/V and all other sxs at this time. No further complaints or concerns at this time.         Arrival mode: Personal vehicle    PCP: Primary Doctor No       Past Medical History:  Past Medical History:   Diagnosis Date    Asthma     Diabetes mellitus     Hypertension     Tuberculosis 2002    Treated       Past Surgical History:  History reviewed. No pertinent surgical history.      Family History:  Family History   Problem Relation Age of Onset    Family history unknown: Yes       Social History:  Social History     Social History Main Topics    Smoking status: Former Smoker     Quit date: 1997    Smokeless tobacco: Never Used    Alcohol use No    Drug use: No    Sexual activity: No       ROS   Review of Systems   Constitutional: Negative for fever.        -long car trips   HENT: Negative for sore throat.    Respiratory: Positive for shortness of breath.    Cardiovascular: Negative for chest pain and leg swelling.   Gastrointestinal: Negative for nausea and vomiting.   Genitourinary: Negative for dysuria.    Musculoskeletal: Negative for back pain.   Skin: Negative for rash.   Neurological: Negative for weakness.   Hematological: Does not bruise/bleed easily.   All other systems reviewed and are negative.    Physical Exam      Initial Vitals [01/03/18 2135]   BP Pulse Resp Temp SpO2   118/81 108 18 97.6 °F (36.4 °C) 98 %      MAP       93.33          Physical Exam  Nursing Notes and Vital Signs Reviewed.  Constitutional: Patient is in no apparent distress. Well-developed and well-nourished.  Head: Atraumatic. Normocephalic.  Eyes: PERRL. EOM intact. Conjunctivae are not pale. No scleral icterus.  ENT: Mucous membranes are moist. Oropharynx is clear and symmetric.    Neck: Supple. Full ROM. No lymphadenopathy.  Cardiovascular: Regular rate. Regular rhythm. No murmurs, rubs, or gallops. Distal pulses are 2+ and symmetric.  Pulmonary/Chest: No respiratory distress. Clear to auscultation bilaterally. No wheezing or rales.  Abdominal: Soft and non-distended.  There is no tenderness.  No rebound, guarding, or rigidity. Good bowel sounds.  Genitourinary: No CVA tenderness  Musculoskeletal: Moves all extremities. No obvious deformities. No edema. No calf tenderness.  Skin: Warm and dry.  Neurological:  Alert, awake, and appropriate.  Normal speech.  No acute focal neurological deficits are appreciated.  Psychiatric: Normal affect. Good eye contact. Appropriate in content.    ED Course    Critical Care  Date/Time: 1/4/2018 12:26 AM  Performed by: JAMISON OLIVERA  Authorized by: JAMISON OLIVERA   Direct patient critical care time: 20 minutes  Additional history critical care time: 15 minutes  Ordering / reviewing critical care time: 10 minutes  Documentation critical care time: 5 minutes  Consulting other physicians critical care time: 5 minutes  Total critical care time (exclusive of procedural time) : 55 minutes  Critical care was time spent personally by me on the following activities: blood draw for specimens,  "development of treatment plan with patient or surrogate, discussions with consultants, interpretation of cardiac output measurements, evaluation of patient's response to treatment, examination of patient, obtaining history from patient or surrogate, ordering and performing treatments and interventions, ordering and review of laboratory studies, ordering and review of radiographic studies, pulse oximetry, re-evaluation of patient's condition, review of old charts and transcutaneous pacing.        ED Vital Signs:  Vitals:    01/03/18 2135 01/03/18 2137 01/03/18 2149 01/03/18 2154   BP: 118/81 118/81     Pulse: 108 100 (S) (!) 139 (!) 121   Resp: 18 18 (!) 23 18   Temp: 97.6 °F (36.4 °C)      TempSrc: Oral      SpO2: 98% 97% 97% 99%   Weight: 50.9 kg (112 lb 3.4 oz)      Height: 5' 3" (1.6 m)       01/03/18 2200 01/03/18 2230 01/03/18 2301 01/03/18 2355   BP:  112/78 104/62 101/60   Pulse: (!) 111 80 89 (!) 143   Resp: 13 16 18 19   Temp:       TempSrc:       SpO2: 99% 99% 97% (!) 89%   Weight:       Height:        01/03/18 2357 01/04/18 0005 01/04/18 0007 01/04/18 0055   BP: 101/60 (!) 106/59 (!) 106/59    Pulse:  94 96 (!) 130   Resp:  17 18 20   Temp:       TempSrc:       SpO2:  (!) 91% (!) 91% 95%   Weight:       Height:        01/04/18 0110   BP: (!) 141/95   Pulse: (!) 141   Resp: 17   Temp:    TempSrc:    SpO2: 97%   Weight:    Height:        Abnormal Lab Results:  Labs Reviewed   CBC W/ AUTO DIFFERENTIAL - Abnormal; Notable for the following:        Result Value    RBC 4.47 (*)     Hemoglobin 13.6 (*)     All other components within normal limits   COMPREHENSIVE METABOLIC PANEL - Abnormal; Notable for the following:     BUN, Bld 25 (*)     Creatinine 1.5 (*)     Albumin 3.1 (*)     eGFR if  52 (*)     eGFR if non  45 (*)     All other components within normal limits   CK   CK-MB   TROPONIN I   MAGNESIUM      All Lab Results:  Results for orders placed or performed during the " hospital encounter of 01/03/18   CK   Result Value Ref Range    CPK 44 20 - 200 U/L   CK-MB   Result Value Ref Range    CPK 44 20 - 200 U/L    CPK MB 1.2 0.1 - 6.5 ng/mL    MB% 2.7 0.0 - 5.0 %   Troponin I   Result Value Ref Range    Troponin I <0.006 0.000 - 0.026 ng/mL   Magnesium   Result Value Ref Range    Magnesium 1.9 1.6 - 2.6 mg/dL   CBC auto differential   Result Value Ref Range    WBC 6.97 3.90 - 12.70 K/uL    RBC 4.47 (L) 4.60 - 6.20 M/uL    Hemoglobin 13.6 (L) 14.0 - 18.0 g/dL    Hematocrit 40.6 40.0 - 54.0 %    MCV 91 82 - 98 fL    MCH 30.4 27.0 - 31.0 pg    MCHC 33.5 32.0 - 36.0 g/dL    RDW 13.0 11.5 - 14.5 %    Platelets 179 150 - 350 K/uL    MPV 9.7 9.2 - 12.9 fL    Gran # 4.0 1.8 - 7.7 K/uL    Lymph # 1.9 1.0 - 4.8 K/uL    Mono # 1.0 0.3 - 1.0 K/uL    Eos # 0.1 0.0 - 0.5 K/uL    Baso # 0.01 0.00 - 0.20 K/uL    Gran% 57.6 38.0 - 73.0 %    Lymph% 27.8 18.0 - 48.0 %    Mono% 13.6 4.0 - 15.0 %    Eosinophil% 0.9 0.0 - 8.0 %    Basophil% 0.1 0.0 - 1.9 %    Differential Method Automated    Comprehensive metabolic panel   Result Value Ref Range    Sodium 145 136 - 145 mmol/L    Potassium 4.3 3.5 - 5.1 mmol/L    Chloride 109 95 - 110 mmol/L    CO2 24 23 - 29 mmol/L    Glucose 100 70 - 110 mg/dL    BUN, Bld 25 (H) 8 - 23 mg/dL    Creatinine 1.5 (H) 0.5 - 1.4 mg/dL    Calcium 8.8 8.7 - 10.5 mg/dL    Total Protein 7.3 6.0 - 8.4 g/dL    Albumin 3.1 (L) 3.5 - 5.2 g/dL    Total Bilirubin 0.4 0.1 - 1.0 mg/dL    Alkaline Phosphatase 100 55 - 135 U/L    AST 24 10 - 40 U/L    ALT 15 10 - 44 U/L    Anion Gap 12 8 - 16 mmol/L    eGFR if African American 52 (A) >60 mL/min/1.73 m^2    eGFR if non African American 45 (A) >60 mL/min/1.73 m^2     Imaging Results:  Imaging Results          X-Ray Chest PA And Lateral (Final result)  Result time 01/04/18 00:17:11    Final result by Cruzito Finnegan MD (01/04/18 00:17:11)                 Impression:     Stable abnormal chest x-ray.      Electronically signed by: CRUZITO  DAX  Date:     01/04/18  Time:    00:17              Narrative:    Chest x-ray 2 view    Indication: Shortness of breath.    Findings: Comparison study 5/30/2016.  No change.  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  There is scattered mild scarring elsewhere.  No active infiltrate or pleural fluid collection.  Normal size heart.                             The EKG was ordered, reviewed, and independently interpreted by the ED provider.  Interpretation time: 21:52  Rate: 78 BPM  Rhythm: normal sinus rhythm  Interpretation: Normal QRS. No acute ST changes. No STEMI.    The EKG was ordered, reviewed, and independently interpreted by the ED provider.  Interpretation time: 23:50  Rate: 187 BPM  Rhythm: atrial fibrillation w/ RVR  Interpretation: Normal QRS. No acute ST changes. No STEMI.         The Emergency Provider reviewed the vital signs and test results, which are outlined above.    ED Discussion   11:54 PM: Upon re-evaluation, pt looked comfortable but is tachycardic. Repeated EKG.    1:45 AM: Discussed case with Marquis Oliva NP ([facility]). Dr. Sen agrees with current care and management of pt and accepts admission.   Admitting Service: Hospital medicine   Admitting Physician: Dr. Sen  Admit to: ICU    1:45 AM: Re-evaluated pt. I have discussed test results, shared treatment plan, and the need for admission with patient and family at bedside. Pt and family express understanding at this time and agree with all information. All questions answered. Pt and family have no further questions or concerns at this time. Pt is ready for admit.    1:55 AM: As shown on the monitor, pt is back in a normal sinus rhythm with a rate of approximately 80.     ED Medication(s):  Medications   diltiaZEM 125 mg in dextrose 5% 125 mL infusion (non-titrating) (0 mg/hr Intravenous Stopped 1/4/18 0200)   albuterol-ipratropium 2.5mg-0.5mg/3mL nebulizer solution 3 mL  (3 mLs Nebulization Given 1/3/18 2200)   methylPREDNISolone sodium succinate injection 125 mg (125 mg Intravenous Given 1/3/18 2353)   sodium chloride 0.9% bolus 1,000 mL (0 mLs Intravenous Stopped 1/4/18 0115)   diltiaZEM injection 10 mg (10 mg Intravenous Given 1/3/18 2357)   moxifloxacin 400 mg/250 mL IVPB 400 mg (400 mg Intravenous New Bag 1/4/18 0101)       New Prescriptions    No medications on file             Medical Decision Making    Medical Decision Making:   Clinical Tests:   Lab Tests: Reviewed and Ordered  Radiological Study: Reviewed and Ordered  Medical Tests: Reviewed and Ordered           Scribe Attestation:   Scribe #1: I performed the above scribed service and the documentation accurately describes the services I performed. I attest to the accuracy of the note.    Attending:   Physician Attestation Statement for Scribe #1: I, Noah Cordero MD, personally performed the services described in this documentation, as scribed by Tova Rock, in my presence, and it is both accurate and complete.          Clinical Impression       ICD-10-CM ICD-9-CM   1. Acute bronchitis, unspecified organism J20.9 466.0   2. SOB (shortness of breath) R06.02 786.05   3. Hypoxia R09.02 799.02   4. Atrial fibrillation with rapid ventricular response I48.91 427.31   5. Respiratory failure J96.90 518.81       Disposition:   Disposition: Admitted  Condition: Fair         Noah Cordero MD  01/04/18 [id number]

## 2018-01-04 NOTE — SUBJECTIVE & OBJECTIVE
Interval History: Patient was admitted with asthma exacerbation under the care of [facility]. He was initially admitted to ICU on Bipap. Patient was given IV steroids, IV abx, PO abx, and breathing tx. Breathing improved and pt was transitioned from bipap to NC @ 3l/min via NC - and oxygen sat is 99%. Patient downgraded from ICU to telemetry. Pt with incident of aflutter - placed on diltiazem gtt and converted to NSR. 2D echo shows normal EF with normal diastolic function. Eliquis initiated. Pt c/o abd pain and constipation. KUB ordered.       Review of Systems   Constitutional: Positive for appetite change and chills. Negative for activity change, fatigue and fever.   HENT: Positive for sore throat. Negative for facial swelling, tinnitus and trouble swallowing.    Eyes: Negative for discharge and visual disturbance.   Respiratory: Positive for cough and shortness of breath. Negative for apnea, chest tightness and wheezing.    Cardiovascular: Positive for palpitations. Negative for chest pain and leg swelling.   Gastrointestinal: Positive for abdominal pain and constipation. Negative for abdominal distention, diarrhea, nausea and vomiting.   Endocrine: Negative for polydipsia, polyphagia and polyuria.   Genitourinary: Negative for decreased urine volume, dysuria, flank pain, frequency and hematuria.   Musculoskeletal: Negative for arthralgias, back pain, joint swelling, myalgias and neck stiffness.   Skin: Negative for wound.   Allergic/Immunologic: Negative for immunocompromised state.   Neurological: Positive for headaches. Negative for dizziness, seizures, syncope, weakness and numbness.   Hematological: Negative.    Psychiatric/Behavioral: Negative for agitation, confusion and hallucinations. The patient is not nervous/anxious.    All other systems reviewed and are negative.    Objective:     Vital Signs (Most Recent):  Temp: 98.2 °F (36.8 °C) (01/04/18 0602)  Pulse: 62 (01/04/18 1226)  Resp: 18 (01/04/18  1226)  BP: 122/65 (01/04/18 1202)  SpO2: 99 % (01/04/18 1226) Vital Signs (24h Range):  Temp:  [97.6 °F (36.4 °C)-98.2 °F (36.8 °C)] 98.2 °F (36.8 °C)  Pulse:  [] 62  Resp:  [13-23] 18  SpO2:  [89 %-100 %] 99 %  BP: (101-141)/(58-95) 122/65     Weight: 50.9 kg (112 lb 3.4 oz)  Body mass index is 19.88 kg/m².    Intake/Output Summary (Last 24 hours) at 01/04/18 1245  Last data filed at 01/04/18 0556   Gross per 24 hour   Intake             1315 ml   Output                0 ml   Net             1315 ml      Physical Exam   Constitutional: He is oriented to person, place, and time. He appears well-developed and well-nourished. No distress.   HENT:   Head: Normocephalic and atraumatic.   Mouth/Throat: Oropharynx is clear and moist.   Eyes: Conjunctivae are normal. Pupils are equal, round, and reactive to light. No scleral icterus.   Neck: No JVD present. No thyromegaly present.   Cardiovascular: Normal rate, regular rhythm, normal heart sounds and intact distal pulses.    Pulmonary/Chest: Accessory muscle usage present. He is in respiratory distress (mild). He has no wheezes. He has rales.   Coarse breath sounds bilaterally    Abdominal: Soft. Bowel sounds are normal. He exhibits no distension. There is tenderness. There is no guarding.   Musculoskeletal: Normal range of motion. He exhibits no edema.   Neurological: He is alert and oriented to person, place, and time. No cranial nerve deficit.   Skin: Skin is warm and dry. Capillary refill takes 2 to 3 seconds. No erythema.   Psychiatric: He has a normal mood and affect.   Nursing note and vitals reviewed.      Significant Labs:   Recent Lab Results       01/04/18  0800 01/04/18  0622 01/04/18  0400 01/04/18  0400 01/03/18  2220      Albumin  2.8(L)   3.1(L)     Alkaline Phosphatase     100     Allens Test   Pass       ALT     15     Anion Gap  11   12     Appearance, UA    Clear      AST     24     Baso #  0.00   0.01     Basophil%  0.0   0.1     Bilirubin (UA)    " Negative      Total Bilirubin     0.4  Comment:  For infants and newborns, interpretation of results should be based  on gestational age, weight and in agreement with clinical  observations.  Premature Infant recommended reference ranges:  Up to 24 hours.............<8.0 mg/dL  Up to 48 hours............<12.0 mg/dL  3-5 days..................<15.0 mg/dL  6-29 days.................<15.0 mg/dL       Site   LR       BUN, Bld  23   25(H)     Calcium  8.4(L)   8.8     Chloride  110   109     CO2  21(L)   24     Color, UA    Yellow      CPK     44          44     CPK MB     1.2     Creatinine  1.5(H)   1.5(H)     Diastolic Dysfunction No         DelSys   CPAP/BiPAP       Differential Method  Automated   Automated     EF 60         eGFR if   52(A)   52(A)     eGFR if non   45  Comment:  Calculation used to obtain the estimated glomerular filtration  rate (eGFR) is the CKD-EPI equation.   (A)   45  Comment:  Calculation used to obtain the estimated glomerular filtration  rate (eGFR) is the CKD-EPI equation.   (A)     Eos #  0.0   0.1     Eosinophil%  0.0   0.9     EP   8       FiO2   50       Free T4  1.12        Glucose  180(H)   100     Glucose, UA    Negative      Gran #  5.4   4.0     Gran%  92.2(H)   57.6     Hematocrit  38.4(L)   40.6     Hemoglobin  12.8(L)   13.6(L)     Coumadin Monitoring INR  1.0  Comment:  Coumadin Therapy:  2.0 - 3.0 for INR for all indicators except mechanical heart valves  and antiphospholipid syndromes which should use 2.5 - 3.5.          IP   16       Ketones, UA    Negative      Leukocytes, UA    Negative      Lymph #  0.4(L)   1.9     Lymph%  6.8(L)   27.8     Magnesium  2.4   1.9     MB%     2.7  Comment:  To be positive, the MB% must be greater than 5% AND the CK-MB  greater than 6.5 ng/mL. Values not in the reference interval,   but not qualifying as positive, should be considered "trace".       MCH  30.2   30.4     MCHC  33.3   33.5     MCV  91   91     " Mode   BiPAP       Mono #  0.1(L)   1.0     Mono%  1.0(L)   13.6     MPV  9.7   9.7     Mitral Valve Regurgitation MILD         Nitrite, UA    Negative      Occult Blood UA    Negative      Pericardial Effusion TRIVIAL         pH, UA    6.0      Phosphorus  2.3(L)          2.3(L)        Platelets  155   179     POC BE   -1       POC HCO3   24.3       POC PCO2   44.5       POC PH   7.344(L)       POC PO2   189(H)       POC SATURATED O2   100       Potassium  4.5   4.3     Total Protein     7.3     Protein, UA    Negative  Comment:  Recommend a 24 hour urine protein or a urine   protein/creatinine ratio if globulin induced proteinuria is  clinically suspected.        Protime  10.7        Rate   14       RBC  4.24(L)   4.47(L)     RDW  13.0   13.0     Sample   ARTERIAL       Sodium  142   145     Specific [place], UA    1.015      Specimen UA    Urine, Clean Catch      Troponin I     <0.006  Comment:  The reference interval for Troponin I represents the 99th percentile   cutoff   for our facility and is consistent with 3rd generation assay   performance.       TSH  0.082(L)        Urobilinogen, UA    Negative      WBC  5.89   6.97                   All pertinent labs within the past 24 hours have been reviewed.    Significant Imaging: I have reviewed all pertinent imaging results/findings within the past 24 hours.

## 2018-01-04 NOTE — H&P
[facility]  History & Physical    Patient Name: Baltazar Garcia  MRN: [id number]  Admission Date: 1/3/2018  Attending Physician: Ernesto Sen MD  Primary Care Provider: Primary Doctor No         Patient information was obtained from patient, relative(s) and ER records.     Subjective:     Principal Problem:Asthma exacerbation, non-allergic, moderate persistent    Chief Complaint:   Chief Complaint   Patient presents with    Shortness of Breath        HPI: 78M h/o asthma presents with cough and sob.  The patient reports cough started after coming home from recent travel. He presented to his PCP who started the patient on zpack.  Cough never completely resolved.     His sob acutely worsened today which alerted the patient's daughter to bring the patient to the hospital.  He uses albuterol inhaler twice a day and has not been increasing the usage of inhaler.   Denies sick contacts.  Does report recent travel out of town.  No other exacerbating or alleviating factors.     Pertinent ER course  Patient was found to by mildly tachycardic in ER with O2 sat 98% on NC .  He was given 3 albuterol treatments and improved respiratory status.  However, patient went into aflutter/afib when he was using the restroom.  EKG showed aflutter. Flutter rate>250.  O2 found to be 89% at that time.  He was placed on Bipap and given IV solumedrol 125mg bolus. He was also bolused with diltiazem and placed on diltiazem gtt.  After about 40 minutes, the patient converted to NSR .  He was able to be titrated off of diltiazem gtt.     Past Medical History:   Diagnosis Date    Asthma     Diabetes mellitus     Hypertension     Tuberculosis 2002    Treated       History reviewed. No pertinent surgical history.    Review of patient's allergies indicates:  No Known Allergies    No current facility-administered medications on file prior to encounter.      Current Outpatient Prescriptions on File Prior to Encounter    Medication Sig    albuterol 90 mcg/actuation inhaler Inhale 1-2 puffs into the lungs every 6 (six) hours as needed for Wheezing.    carvedilol (COREG) 3.125 MG tablet Take 1 tablet (3.125 mg total) by mouth 2 (two) times daily with meals.    diltiazem (CARDIZEM CD) 120 MG Cp24 Take 1 capsule (120 mg total) by mouth once daily. Call PCP for refills    enalapril (VASOTEC) 2.5 MG tablet Take 2.5 mg by mouth once daily.    finasteride (PROSCAR) 5 mg tablet Take 1 tablet (5 mg total) by mouth once daily. Call PCP for refills    levocetirizine (XYZAL) 5 MG tablet Take 5 mg by mouth every evening.    lovastatin (MEVACOR) 10 MG tablet Take 10 mg by mouth every evening.    metformin (GLUCOPHAGE) 500 MG tablet Take 500 mg by mouth 2 (two) times daily with meals.    tamsulosin (FLOMAX) 0.4 mg Cp24 Take 1 capsule (0.4 mg total) by mouth once daily. Call PCP for refills    methylPREDNISolone (MEDROL DOSEPACK) 4 mg tablet As directed.     Family History     Family history is unknown by patient.        Social History Main Topics    Smoking status: Former Smoker     Quit date: 1997    Smokeless tobacco: Never Used    Alcohol use No    Drug use: No    Sexual activity: No     Review of Systems   Constitutional: Negative for activity change, appetite change, chills, diaphoresis, fatigue and fever.   HENT: Negative for facial swelling, sore throat, tinnitus and trouble swallowing.    Eyes: Negative for photophobia and visual disturbance.   Respiratory: Positive for cough and shortness of breath. Negative for apnea, chest tightness and wheezing.    Cardiovascular: Negative for chest pain, palpitations and leg swelling.   Gastrointestinal: Negative for abdominal distention, abdominal pain, constipation, diarrhea, nausea and vomiting.   Endocrine: Negative for polydipsia, polyphagia and polyuria.   Genitourinary: Negative for decreased urine volume, dysuria, flank pain, frequency and hematuria.   Musculoskeletal: Negative  for arthralgias, back pain, joint swelling, myalgias and neck stiffness.   Skin: Negative for pallor and rash.   Allergic/Immunologic: Negative for immunocompromised state.   Neurological: Negative for dizziness, seizures, syncope, weakness, numbness and headaches.   Psychiatric/Behavioral: Negative for confusion, hallucinations and suicidal ideas. The patient is not nervous/anxious.    All other systems reviewed and are negative.    Objective:     Vital Signs (Most Recent):  Temp: 97.6 °F (36.4 °C) (01/03/18 2135)  Pulse: (!) 141 (01/04/18 0110)  Resp: 17 (01/04/18 0110)  BP: (!) 141/95 (01/04/18 0110)  SpO2: 97 % (01/04/18 0110) Vital Signs (24h Range):  Temp:  [97.6 °F (36.4 °C)] 97.6 °F (36.4 °C)  Pulse:  [] 141  Resp:  [13-23] 17  SpO2:  [89 %-99 %] 97 %  BP: (101-141)/(59-95) 141/95     Weight: 50.9 kg (112 lb 3.4 oz)  Body mass index is 19.88 kg/m².    Physical Exam   Constitutional: He is oriented to person, place, and time. He appears well-developed and well-nourished. No distress.   HENT:   Head: Normocephalic and atraumatic.   Mouth/Throat: Oropharynx is clear and moist.   Eyes: Conjunctivae are normal. Pupils are equal, round, and reactive to light. No scleral icterus.   Neck: No JVD present. No thyromegaly present.   Cardiovascular: Normal rate and regular rhythm.  Exam reveals no gallop and no friction rub.    No murmur heard.  Pulmonary/Chest: No respiratory distress. He has no wheezes. He has no rales.   Coarse breath sounds bilaterally    Abdominal: Soft. Bowel sounds are normal. He exhibits no distension. There is no tenderness. There is no guarding.   Musculoskeletal: Normal range of motion.   Neurological: He is alert and oriented to person, place, and time. No cranial nerve deficit.   Skin: Skin is warm. Capillary refill takes more than 3 seconds. He is not diaphoretic. No erythema.   Psychiatric: He has a normal mood and affect.   Nursing note and vitals reviewed.        CRANIAL NERVES      CN III, IV, VI   Pupils are equal, round, and reactive to light.       Significant Labs:   BMP:   Recent Labs  Lab 01/03/18  2220         K 4.3      CO2 24   BUN 25*   CREATININE 1.5*   CALCIUM 8.8   MG 1.9     CBC:   Recent Labs  Lab 01/03/18  2220   WBC 6.97   HGB 13.6*   HCT 40.6        Magnesium:   Recent Labs  Lab 01/03/18  2220   MG 1.9     TSH: No results for input(s): TSH in the last 4320 hours.  All pertinent labs within the past 24 hours have been reviewed.    Significant Imaging: I have reviewed and interpreted all pertinent imaging results/findings within the past 24 hours.   Imaging Results          X-Ray Chest PA And Lateral (Final result)  Result time 01/04/18 00:17:11    Final result by Cruzito Finnegan MD (01/04/18 00:17:11)                 Impression:     Stable abnormal chest x-ray.      Electronically signed by: CRUZITO FINNEGAN  Date:     01/04/18  Time:    00:17              Narrative:    Chest x-ray 2 view    Indication: Shortness of breath.    Findings: Comparison study 5/30/2016.  No change.  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  There is scattered mild scarring elsewhere.  No active infiltrate or pleural fluid collection.  Normal size heart.                                Assessment/Plan:     * Asthma exacerbation, non-allergic, moderate persistent    - exacerbated by URI  - severe exacerbation requiring close monitoring in ICU  - was given solumedrol 125mg bolus and duonebs x 3 in ER  - continue solumedrol 80mg q8h, duonebs q4h, moxifloxacin  - give magnesium infusion 2 g over 20 min  - check abg  - titrate bipap for goal O2 sat >93%          Acute renal failure    - likely prerenal from decreased renal perfusion  - check UA  - strict I/O  - restart finasteride         Paroxysmal atrial flutter    - patient and family reports no history of afib/aflutter, however, patient is on diltiazem  - restart  diltiazem and coreg at home dose  - check tsh  - CHADvasc 4, will need to be on anticoagulation  - check echo to rule out valvular disease, if negative then will start on apixaban  - start asa for now          Diabetes    - on metformin at home  - hold metformin now  - check Hb a1c, place on low dose SSI and POCG QAC/QHS          HTN (hypertension)    - takes enalapril 2.5mg daily, coreg 3.125mg bid, diltiazem 120mg daily at home.  - hold enalapril for now due to ARF  - restart diltiazem, would also be beneficial in the setting of atrial flutter/Afib           VTE Risk Mitigation         Ordered     enoxaparin injection 40 mg  Daily     Route:  Subcutaneous        01/04/18 0244     Medium Risk of VTE  Once      01/04/18 0244             Ernesto Sen MD  Department of Hospital Medicine   [facility]

## 2018-01-04 NOTE — ASSESSMENT & PLAN NOTE
- on metformin at home  - hold metformin now  - check Hb a1c, place on low dose SSI and POCG QAC/QHS

## 2018-01-04 NOTE — ED NOTES
Pt back from xray - HR increased 130's in A-fib.  New onset A-fib. Pt family states pt has never been dx with irregular heart rhythm before.  Dr Cordero aware. EKG repeated

## 2018-01-04 NOTE — PROGRESS NOTES
RN & RT coordinate trying to get patient off of Bipap if he can tolerate. He is being trialed off of Bipap. He is on 45% VM now. He seems to be tolerating well. Informed Buck Carlin RN.

## 2018-01-04 NOTE — ASSESSMENT & PLAN NOTE
- likely prerenal from decreased renal perfusion  --U/O negative nitrities, negative leukocytes  - strict I/Os  --IVF's- restart finasteride

## 2018-01-05 VITALS
HEART RATE: 74 BPM | RESPIRATION RATE: 20 BRPM | SYSTOLIC BLOOD PRESSURE: 123 MMHG | BODY MASS INDEX: 19.88 KG/M2 | OXYGEN SATURATION: 98 % | DIASTOLIC BLOOD PRESSURE: 60 MMHG | WEIGHT: 112.19 LBS | HEIGHT: 63 IN | TEMPERATURE: 98 F

## 2018-01-05 LAB
ALBUMIN SERPL BCP-MCNC: 2.7 G/DL
ANION GAP SERPL CALC-SCNC: 10 MMOL/L
BASOPHILS # BLD AUTO: 0 K/UL
BASOPHILS NFR BLD: 0 %
BUN SERPL-MCNC: 27 MG/DL
CALCIUM SERPL-MCNC: 8.2 MG/DL
CHLORIDE SERPL-SCNC: 111 MMOL/L
CO2 SERPL-SCNC: 23 MMOL/L
CREAT SERPL-MCNC: 1.5 MG/DL
DIFFERENTIAL METHOD: ABNORMAL
EOSINOPHIL # BLD AUTO: 0 K/UL
EOSINOPHIL NFR BLD: 0 %
ERYTHROCYTE [DISTWIDTH] IN BLOOD BY AUTOMATED COUNT: 13.1 %
EST. GFR  (AFRICAN AMERICAN): 52 ML/MIN/1.73 M^2
EST. GFR  (NON AFRICAN AMERICAN): 45 ML/MIN/1.73 M^2
GLUCOSE SERPL-MCNC: 172 MG/DL
HCT VFR BLD AUTO: 37.1 %
HGB BLD-MCNC: 12.2 G/DL
LYMPHOCYTES # BLD AUTO: 0.5 K/UL
LYMPHOCYTES NFR BLD: 4.8 %
MAGNESIUM SERPL-MCNC: 2.3 MG/DL
MCH RBC QN AUTO: 29.8 PG
MCHC RBC AUTO-ENTMCNC: 32.9 G/DL
MCV RBC AUTO: 91 FL
MONOCYTES # BLD AUTO: 0.2 K/UL
MONOCYTES NFR BLD: 1.8 %
NEUTROPHILS # BLD AUTO: 10.1 K/UL
NEUTROPHILS NFR BLD: 93.4 %
PHOSPHATE SERPL-MCNC: 2.2 MG/DL
PHOSPHATE SERPL-MCNC: 2.2 MG/DL
PLATELET # BLD AUTO: 214 K/UL
PMV BLD AUTO: 10 FL
POCT GLUCOSE: 143 MG/DL (ref 70–110)
POCT GLUCOSE: 144 MG/DL (ref 70–110)
POCT GLUCOSE: 156 MG/DL (ref 70–110)
POTASSIUM SERPL-SCNC: 4.5 MMOL/L
RBC # BLD AUTO: 4.09 M/UL
SODIUM SERPL-SCNC: 144 MMOL/L
TROPONIN I SERPL DL<=0.01 NG/ML-MCNC: 0.01 NG/ML
TROPONIN I SERPL DL<=0.01 NG/ML-MCNC: 0.01 NG/ML
TROPONIN I SERPL DL<=0.01 NG/ML-MCNC: <0.006 NG/ML
TROPONIN I SERPL DL<=0.01 NG/ML-MCNC: <0.006 NG/ML
WBC # BLD AUTO: 10.85 K/UL

## 2018-01-05 PROCEDURE — 94640 AIRWAY INHALATION TREATMENT: CPT

## 2018-01-05 PROCEDURE — 93005 ELECTROCARDIOGRAM TRACING: CPT

## 2018-01-05 PROCEDURE — 93010 ELECTROCARDIOGRAM REPORT: CPT | Mod: ,,, | Performed by: INTERNAL MEDICINE

## 2018-01-05 PROCEDURE — 25000003 PHARM REV CODE 250: Performed by: NURSE PRACTITIONER

## 2018-01-05 PROCEDURE — 63600175 PHARM REV CODE 636 W HCPCS: Performed by: HOSPITALIST

## 2018-01-05 PROCEDURE — 36415 COLL VENOUS BLD VENIPUNCTURE: CPT

## 2018-01-05 PROCEDURE — 83735 ASSAY OF MAGNESIUM: CPT

## 2018-01-05 PROCEDURE — 63600175 PHARM REV CODE 636 W HCPCS: Performed by: INTERNAL MEDICINE

## 2018-01-05 PROCEDURE — 80069 RENAL FUNCTION PANEL: CPT

## 2018-01-05 PROCEDURE — 27000221 HC OXYGEN, UP TO 24 HOURS

## 2018-01-05 PROCEDURE — 25000242 PHARM REV CODE 250 ALT 637 W/ HCPCS: Performed by: INTERNAL MEDICINE

## 2018-01-05 PROCEDURE — 99233 SBSQ HOSP IP/OBS HIGH 50: CPT | Mod: ,,, | Performed by: INTERNAL MEDICINE

## 2018-01-05 PROCEDURE — 93010 ELECTROCARDIOGRAM REPORT: CPT | Mod: 76,,, | Performed by: INTERNAL MEDICINE

## 2018-01-05 PROCEDURE — 84484 ASSAY OF TROPONIN QUANT: CPT

## 2018-01-05 PROCEDURE — 85025 COMPLETE CBC W/AUTO DIFF WBC: CPT

## 2018-01-05 PROCEDURE — 25000003 PHARM REV CODE 250: Performed by: HOSPITALIST

## 2018-01-05 RX ORDER — FUROSEMIDE 10 MG/ML
40 INJECTION INTRAMUSCULAR; INTRAVENOUS ONCE
Status: DISCONTINUED | OUTPATIENT
Start: 2018-01-05 | End: 2018-01-05

## 2018-01-05 RX ORDER — FUROSEMIDE 10 MG/ML
40 INJECTION INTRAMUSCULAR; INTRAVENOUS ONCE
Status: COMPLETED | OUTPATIENT
Start: 2018-01-05 | End: 2018-01-05

## 2018-01-05 RX ORDER — PANTOPRAZOLE SODIUM 40 MG/1
40 TABLET, DELAYED RELEASE ORAL DAILY
Qty: 30 TABLET | Refills: 0 | Status: SHIPPED | OUTPATIENT
Start: 2018-01-06 | End: 2019-08-05 | Stop reason: SDUPTHER

## 2018-01-05 RX ORDER — NITROGLYCERIN 0.4 MG/1
0.4 TABLET SUBLINGUAL EVERY 5 MIN PRN
Status: DISCONTINUED | OUTPATIENT
Start: 2018-01-05 | End: 2018-01-05 | Stop reason: HOSPADM

## 2018-01-05 RX ORDER — POLYETHYLENE GLYCOL 3350 17 G/17G
17 POWDER, FOR SOLUTION ORAL DAILY
Qty: 30 PACKET | Refills: 0 | Status: SHIPPED | OUTPATIENT
Start: 2018-01-06 | End: 2019-05-07 | Stop reason: SDUPTHER

## 2018-01-05 RX ORDER — PREDNISONE 20 MG/1
TABLET ORAL
Qty: 21 TABLET | Refills: 0 | Status: SHIPPED | OUTPATIENT
Start: 2018-01-05 | End: 2018-01-17 | Stop reason: ALTCHOICE

## 2018-01-05 RX ORDER — IPRATROPIUM BROMIDE 0.5 MG/2.5ML
500 SOLUTION RESPIRATORY (INHALATION) EVERY 6 HOURS
Qty: 2 BOX | Refills: 0 | Status: SHIPPED | OUTPATIENT
Start: 2018-01-05 | End: 2018-01-17 | Stop reason: ALTCHOICE

## 2018-01-05 RX ORDER — MOXIFLOXACIN HYDROCHLORIDE 400 MG/1
400 TABLET ORAL DAILY
Qty: 5 TABLET | Refills: 0 | Status: SHIPPED | OUTPATIENT
Start: 2018-01-05 | End: 2018-01-10

## 2018-01-05 RX ORDER — POLYETHYLENE GLYCOL 3350 17 G/17G
17 POWDER, FOR SOLUTION ORAL DAILY
Status: DISCONTINUED | OUTPATIENT
Start: 2018-01-06 | End: 2018-01-05 | Stop reason: HOSPADM

## 2018-01-05 RX ORDER — ASPIRIN 81 MG/1
81 TABLET ORAL DAILY
Refills: 0 | COMMUNITY
Start: 2018-01-06 | End: 2018-04-17 | Stop reason: ALTCHOICE

## 2018-01-05 RX ORDER — LEVALBUTEROL INHALATION SOLUTION 0.63 MG/3ML
1 SOLUTION RESPIRATORY (INHALATION) EVERY 12 HOURS
Qty: 1 BOX | Refills: 0 | Status: SHIPPED | OUTPATIENT
Start: 2018-01-05 | End: 2018-01-17 | Stop reason: ALTCHOICE

## 2018-01-05 RX ORDER — BUDESONIDE 0.5 MG/2ML
0.5 INHALANT ORAL EVERY 12 HOURS
Qty: 120 ML | Refills: 0 | Status: SHIPPED | OUTPATIENT
Start: 2018-01-05 | End: 2018-01-17 | Stop reason: ALTCHOICE

## 2018-01-05 RX ORDER — SODIUM CHLORIDE 9 MG/ML
INJECTION, SOLUTION INTRAVENOUS CONTINUOUS
Status: DISCONTINUED | OUTPATIENT
Start: 2018-01-05 | End: 2018-01-05

## 2018-01-05 RX ADMIN — APIXABAN 2.5 MG: 2.5 TABLET, FILM COATED ORAL at 08:01

## 2018-01-05 RX ADMIN — POTASSIUM PHOSPHATE, MONOBASIC AND POTASSIUM PHOSPHATE, DIBASIC 25 MMOL: 224; 236 INJECTION, SOLUTION INTRAVENOUS at 10:01

## 2018-01-05 RX ADMIN — ASPIRIN 81 MG: 81 TABLET, COATED ORAL at 08:01

## 2018-01-05 RX ADMIN — PREDNISONE 60 MG: 20 TABLET ORAL at 08:01

## 2018-01-05 RX ADMIN — FUROSEMIDE 40 MG: 10 INJECTION, SOLUTION INTRAMUSCULAR; INTRAVENOUS at 03:01

## 2018-01-05 RX ADMIN — IPRATROPIUM BROMIDE 0.5 MG: 0.5 SOLUTION RESPIRATORY (INHALATION) at 08:01

## 2018-01-05 RX ADMIN — CARVEDILOL 3.12 MG: 3.12 TABLET, FILM COATED ORAL at 08:01

## 2018-01-05 RX ADMIN — IPRATROPIUM BROMIDE 0.5 MG: 0.5 SOLUTION RESPIRATORY (INHALATION) at 01:01

## 2018-01-05 RX ADMIN — MOXIFLOXACIN HYDROCHLORIDE 400 MG: 400 TABLET, FILM COATED ORAL at 01:01

## 2018-01-05 RX ADMIN — PANTOPRAZOLE SODIUM 40 MG: 40 TABLET, DELAYED RELEASE ORAL at 08:01

## 2018-01-05 RX ADMIN — DILTIAZEM HYDROCHLORIDE 120 MG: 120 CAPSULE, COATED, EXTENDED RELEASE ORAL at 08:01

## 2018-01-05 RX ADMIN — TAMSULOSIN HYDROCHLORIDE 0.4 MG: 0.4 CAPSULE ORAL at 08:01

## 2018-01-05 NOTE — CONSULTS
Consults received for nebulizer and home oxygen and home health. Nebulizer ordered this am with [facility] ( who has the competitive bid ) . Contacted Oly at [facility], discussed home oxygen . Appropriate paperwork faxed to [facility] after oxygen order corrected. Contacted patient's daughter, discussed options for receiving home health. Preference letter obtained for [facility]. [facility] .  Referral fax[facility] via [employer] [facility]     Update to primary nurse Karena, patient's daughter asked to be notified once oxygen delivered.

## 2018-01-05 NOTE — PLAN OF CARE
CM contacted patient's daughter, Brisa, at [phone].  Patient lives at home with his wife and daughter.  He is normally independent and able to care for himself.  He is able to bring himself to doctor appointments and Brisa does go to appointments when a  is needed.  VIGNESH discussed need for a nebulizer and possible home oxygen.  Brisa verbalized understanding of needs.  There are no other anticipated needs at this time.  VIGNESH provided a transitional care folder, information on advanced directives, information on pharmacy bedside delivery, and discharge planning begins on admission with contact information for [facility]    D/C Plan:  Home with a nebulizer and possible oxygen    VIGNESH handed off to [facility]    @1032 CM faxed order for nebulizer to [facility] @[phone]     01/05/18 1020   Discharge Assessment   Assessment Type Discharge Planning Assessment   Confirmed/corrected address and phone number on facesheet? Yes   Assessment information obtained from? Caregiver;Medical Record   Expected Length of Stay (days) (TBD)   Communicated expected length of stay with patient/caregiver yes   Prior to hospitilization cognitive status: Alert/Oriented   Prior to hospitalization functional status: Independent   Current cognitive status: Alert/Oriented   Current Functional Status: Independent;Needs Assistance   Facility Arrived From: home   Lives With spouse;child(lito), adult   Able to Return to Prior Arrangements yes   Is patient able to care for self after discharge? Yes   Who are your caregiver(s) and their phone number(s)? Brisa Wade, daughter [phone]   Patient's perception of discharge disposition home or selfcare   Readmission Within The Last 30 Days no previous admission in last 30 days   Patient currently being followed by outpatient case management? No   Patient currently receives any other outside agency services? No   Equipment Currently Used at Home none   Do you have any problems affording any of your  prescribed medications? No   Is the patient taking medications as prescribed? yes   Does the patient have transportation home? Yes   Transportation Available family or friend will provide;car   Dialysis Name and Scheduled days NA   Does the patient receive services at the [facility]? No   Discharge Plan A Home with family   Discharge Plan B Home with family   Patient/Family In Agreement With Plan yes

## 2018-01-05 NOTE — HPI
77 y/o with longstanding history of Chronic Obstructive Pulmonary Disease and abnormal Chest X Ray with bilateral apical scarring presented with shortness of breath, cough and chest congestion of 2 week duration that had worsened 2 days prior to presentation. Former smoker. 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  Developed Atrial Fibrillation in the Emergency Room and was converted with medications back to NSR.

## 2018-01-05 NOTE — SUBJECTIVE & OBJECTIVE
Past Medical History:   Diagnosis Date    Asthma     Diabetes mellitus     Hypertension     Tuberculosis 2002    Treated       History reviewed. No pertinent surgical history.    Review of patient's allergies indicates:  No Known Allergies    Family History     Family history is unknown by patient.        Social History Main Topics    Smoking status: Former Smoker     Quit date: 1997    Smokeless tobacco: Never Used    Alcohol use No    Drug use: No    Sexual activity: No         Review of Systems   Constitutional: Positive for fatigue.   HENT: Positive for congestion, postnasal drip and rhinorrhea.    Respiratory: Positive for cough, chest tightness, shortness of breath, wheezing and stridor.    Cardiovascular: Positive for palpitations.     Objective:     Vital Signs (Most Recent):  Temp: 98.2 °F (36.8 °C) (01/04/18 2052)  Pulse: 92 (01/04/18 2052)  Resp: 18 (01/04/18 2052)  BP: 130/66 (01/04/18 2052)  SpO2: 97 % (01/04/18 2052) Vital Signs (24h Range):  Temp:  [98.2 °F (36.8 °C)-98.4 °F (36.9 °C)] 98.2 °F (36.8 °C)  Pulse:  [] 92  Resp:  [14-23] 18  SpO2:  [89 %-100 %] 97 %  BP: (101-141)/(58-95) 130/66     Weight: 50.9 kg (112 lb 3.4 oz)  Body mass index is 19.88 kg/m².      Intake/Output Summary (Last 24 hours) at 01/04/18 2322  Last data filed at 01/04/18 0556   Gross per 24 hour   Intake             1315 ml   Output                0 ml   Net             1315 ml       Physical Exam   Constitutional: He is oriented to person, place, and time. He appears well-developed and well-nourished.   HENT:   Head: Normocephalic and atraumatic.   Mouth/Throat: Oropharyngeal exudate present.   Eyes: Conjunctivae are normal. Pupils are equal, round, and reactive to light.   Neck: Neck supple. No JVD present. No tracheal deviation present. No thyromegaly present.   Cardiovascular: Normal rate, regular rhythm and normal heart sounds.    No murmur heard.  Pulmonary/Chest: Effort normal. He has decreased breath  sounds. He has wheezes in the right lower field and the left lower field. He has no rhonchi. He has no rales.   Abdominal: Soft. Bowel sounds are normal.   Musculoskeletal: Normal range of motion. He exhibits no edema or tenderness.   Lymphadenopathy:     He has no cervical adenopathy.   Neurological: He is alert and oriented to person, place, and time.   Skin: Skin is warm and dry.   Nursing note and vitals reviewed.      Vents:  Oxygen Concentration (%): 32 (01/04/18 1949)    Lines/Drains/Airways     Peripheral Intravenous Line                 Peripheral IV - Single Lumen 01/03/18 Right Hand 1 day         Peripheral IV - Single Lumen 01/04/18 0113 Left Wrist less than 1 day                Significant Labs:    CBC/Anemia Profile:    Recent Labs  Lab 01/03/18 2220 01/04/18 0622   WBC 6.97 5.89   HGB 13.6* 12.8*   HCT 40.6 38.4*    155   MCV 91 91   RDW 13.0 13.0        Chemistries:    Recent Labs  Lab 01/03/18 2220 01/04/18  0622    142   K 4.3 4.5    110   CO2 24 21*   BUN 25* 23   CREATININE 1.5* 1.5*   CALCIUM 8.8 8.4*   ALBUMIN 3.1* 2.8*   PROT 7.3  --    BILITOT 0.4  --    ALKPHOS 100  --    ALT 15  --    AST 24  --    MG 1.9 2.4   PHOS  --  2.3*  2.3*       BMP:   Recent Labs  Lab 01/04/18  0622   *      K 4.5      CO2 21*   BUN 23   CREATININE 1.5*   CALCIUM 8.4*   MG 2.4     CMP:   Recent Labs  Lab 01/03/18 2220 01/04/18  0622    142   K 4.3 4.5    110   CO2 24 21*    180*   BUN 25* 23   CREATININE 1.5* 1.5*   CALCIUM 8.8 8.4*   PROT 7.3  --    ALBUMIN 3.1* 2.8*   BILITOT 0.4  --    ALKPHOS 100  --    AST 24  --    ALT 15  --    ANIONGAP 12 11   EGFRNONAA 45* 45*     All pertinent labs within the past 24 hours have been reviewed.    Significant Imaging:   CXR: I have reviewed all pertinent results/findings within the past 24 hours and my personal findings are:  bilateral apical scarring - stable

## 2018-01-05 NOTE — PROGRESS NOTES
Home Oxygen Evaluation    Date Performed: 2018    1) Patient's Home O2 Sat on room air, while at rest: 94%      If O2 sats on room air at rest are 88% or below, patient qualifies. No additional testing needed. Document N/A in steps 2 and 3. If 89% or above, complete steps 2.      2) Patient's O2 Sat on room air while exercisin%        If O2 sats on room air while exercising remain 89% or above patient does not qualify, no further testing needed Document N/A in step 3. If O2 sats on room air while exercising are 88% or below, continue to step 3.      3) Patient's O2 Sat while exercising on O2: 95 at 2 LPM         (Must show improvement from #2 for patients to qualify)    If O2 sats improve on oxygen, patient qualifies for portable oxygen. If not, the patient does not qualify.

## 2018-01-05 NOTE — NURSING
Called to pt's room with c/o L sided CP. Daughter to translate. Notified JACIEL Manuel NP. Orders as noted.

## 2018-01-05 NOTE — CONSULTS
[facility] -   Pulmonology  Consult Note    Patient Name: Baltazar Garcia  MRN: [id number]  Admission Date: 1/3/2018  Hospital Length of Stay: 0 days  Code Status: Full Code  Attending Physician: Ernesto Sen MD  Primary Care Provider: Josué Faith MD   Principal Problem: Asthma exacerbation, non-allergic, moderate persistent      Subjective: shortness of breath, cough and chest congestion     HPI:  79 y/o with longstanding history of Chronic Obstructive Pulmonary Disease and abnormal Chest X Ray with bilateral apical scarring presented with shortness of breath, cough and chest congestion of 2 week duration that had worsened 2 days prior to presentation. Former smoker. 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  Developed Atrial Fibrillation in the Emergency Room and was converted with medications back to NSR.    Past Medical History:   Diagnosis Date    Asthma     Diabetes mellitus     Hypertension     Tuberculosis 2002    Treated       History reviewed. No pertinent surgical history.    Review of patient's allergies indicates:  No Known Allergies    Family History     Family history is unknown by patient.        Social History Main Topics    Smoking status: Former Smoker     Quit date: 1997    Smokeless tobacco: Never Used    Alcohol use No    Drug use: No    Sexual activity: No         Review of Systems   Constitutional: Positive for fatigue.   HENT: Positive for congestion, postnasal drip and rhinorrhea.    Respiratory: Positive for cough, chest tightness, shortness of breath, wheezing and stridor.    Cardiovascular: Positive for palpitations.     Objective:     Vital Signs (Most Recent):  Temp: 98.2 °F (36.8 °C) (01/04/18 2052)  Pulse: 92 (01/04/18 2052)  Resp: 18 (01/04/18 2052)  BP: 130/66 (01/04/18 2052)  SpO2: 97 % (01/04/18 2052) Vital Signs (24h Range):  Temp:  [98.2 °F (36.8 °C)-98.4 °F (36.9 °C)] 98.2 °F (36.8 °C)  Pulse:  [] 92  Resp:  [14-23] 18  SpO2:  [89  %-100 %] 97 %  BP: (101-141)/(58-95) 130/66     Weight: 50.9 kg (112 lb 3.4 oz)  Body mass index is 19.88 kg/m².      Intake/Output Summary (Last 24 hours) at 01/04/18 2322  Last data filed at 01/04/18 0556   Gross per 24 hour   Intake             1315 ml   Output                0 ml   Net             1315 ml       Physical Exam   Constitutional: He is oriented to person, place, and time. He appears well-developed and well-nourished.   HENT:   Head: Normocephalic and atraumatic.   Mouth/Throat: Oropharyngeal exudate present.   Eyes: Conjunctivae are normal. Pupils are equal, round, and reactive to light.   Neck: Neck supple. No JVD present. No tracheal deviation present. No thyromegaly present.   Cardiovascular: Normal rate, regular rhythm and normal heart sounds.    No murmur heard.  Pulmonary/Chest: Effort normal. He has decreased breath sounds. He has wheezes in the right lower field and the left lower field. He has no rhonchi. He has no rales.   Abdominal: Soft. Bowel sounds are normal.   Musculoskeletal: Normal range of motion. He exhibits no edema or tenderness.   Lymphadenopathy:     He has no cervical adenopathy.   Neurological: He is alert and oriented to person, place, and time.   Skin: Skin is warm and dry.   Nursing note and vitals reviewed.      Vents:  Oxygen Concentration (%): 32 (01/04/18 1949)    Lines/Drains/Airways     Peripheral Intravenous Line                 Peripheral IV - Single Lumen 01/03/18 Right Hand 1 day         Peripheral IV - Single Lumen 01/04/18 0113 Left Wrist less than 1 day                Significant Labs:    CBC/Anemia Profile:    Recent Labs  Lab 01/03/18 2220 01/04/18  0622   WBC 6.97 5.89   HGB 13.6* 12.8*   HCT 40.6 38.4*    155   MCV 91 91   RDW 13.0 13.0        Chemistries:    Recent Labs  Lab 01/03/18  2220 01/04/18  0622    142   K 4.3 4.5    110   CO2 24 21*   BUN 25* 23   CREATININE 1.5* 1.5*   CALCIUM 8.8 8.4*   ALBUMIN 3.1* 2.8*   PROT 7.3  --     BILITOT 0.4  --    ALKPHOS 100  --    ALT 15  --    AST 24  --    MG 1.9 2.4   PHOS  --  2.3*  2.3*       BMP:   Recent Labs  Lab 01/04/18  0622   *      K 4.5      CO2 21*   BUN 23   CREATININE 1.5*   CALCIUM 8.4*   MG 2.4     CMP:   Recent Labs  Lab 01/03/18  2220 01/04/18  0622    142   K 4.3 4.5    110   CO2 24 21*    180*   BUN 25* 23   CREATININE 1.5* 1.5*   CALCIUM 8.8 8.4*   PROT 7.3  --    ALBUMIN 3.1* 2.8*   BILITOT 0.4  --    ALKPHOS 100  --    AST 24  --    ALT 15  --    ANIONGAP 12 11   EGFRNONAA 45* 45*     All pertinent labs within the past 24 hours have been reviewed.    Significant Imaging:   CXR: I have reviewed all pertinent results/findings within the past 24 hours and my personal findings are:  bilateral apical scarring - stable    Assessment/Plan:     COPD exacerbation    1/4 Check ABG ,Switch to po prednisone in am, switch to po meds and consider follow up in pulmonary clinic in 1 week        Paroxysmal atrial flutter    1/4 continue treatment              Thank you for your consult. I will follow-up with patient. Please contact us if you have any additional questions.     Davon Iverson MD  Pulmonology  [facility]

## 2018-01-05 NOTE — PLAN OF CARE
Problem: Patient Care Overview  Goal: Plan of Care Review  Outcome: Ongoing (interventions implemented as appropriate)  Remains free from harm, see prior notes for overnight status, will continue to monitor.

## 2018-01-05 NOTE — ASSESSMENT & PLAN NOTE
1/4 Check ABG ,Switch to po prednisone in am, switch to po meds and consider follow up in pulmonary clinic in 1 week

## 2018-01-05 NOTE — PROGRESS NOTES
[facility] Medicine  Progress Note    Patient Name: Baltazar Garcia  MRN: [id number]  Patient Class: IP- Inpatient   Admission Date: 1/3/2018  Length of Stay: 0 days  Attending Physician: Lyndon Colindres MD  Primary Care Provider: Josué Faith MD        Subjective:     Principal Problem:Asthma exacerbation, non-allergic, moderate persistent    HPI:  78M h/o asthma presents with cough and sob.  The patient reports cough started after coming home from recent travel. He presented to his PCP who started the patient on zpack.  Cough never completely resolved.     His sob acutely worsened today which alerted the patient's daughter to bring the patient to the hospital.  He uses albuterol inhaler twice a day and has not been increasing the usage of inhaler.   Denies sick contacts.  Does report recent travel out of town.  No other exacerbating or alleviating factors.     Pertinent ER course  Patient was found to by mildly tachycardic in ER with O2 sat 98% on NC .  He was given 3 albuterol treatments and improved respiratory status.  However, patient went into aflutter/afib when he was using the restroom.  EKG showed aflutter. Flutter rate>250.  O2 found to be 89% at that time.  He was placed on Bipap and given IV solumedrol 125mg bolus. He was also bolused with diltiazem and placed on diltiazem gtt.  After about 40 minutes, the patient converted to NSR .  He was able to be titrated off of diltiazem gtt.     Hospital Course:  No notes on file    Interval History: Patient was admitted with asthma exacerbation under the care of [facility]. He was initially admitted to ICU on Bipap. Patient was given IV steroids, IV abx, PO abx, and breathing tx. Breathing improved and pt was transitioned from bipap to NC @ 3l/min via NC - and oxygen sat is 99%. Patient downgraded from ICU to telemetry. Pt with incident of aflutter - placed on diltiazem gtt and converted to NSR. 2D echo shows normal EF with normal  diastolic function. Eliquis initiated. Pt c/o abd pain and constipation. KUB ordered.       Review of Systems   Constitutional: Positive for appetite change and chills. Negative for activity change, fatigue and fever.   HENT: Positive for sore throat. Negative for facial swelling, tinnitus and trouble swallowing.    Eyes: Negative for discharge and visual disturbance.   Respiratory: Positive for cough and shortness of breath. Negative for apnea, chest tightness and wheezing.    Cardiovascular: Positive for palpitations. Negative for chest pain and leg swelling.   Gastrointestinal: Positive for abdominal pain and constipation. Negative for abdominal distention, diarrhea, nausea and vomiting.   Endocrine: Negative for polydipsia, polyphagia and polyuria.   Genitourinary: Negative for decreased urine volume, dysuria, flank pain, frequency and hematuria.   Musculoskeletal: Negative for arthralgias, back pain, joint swelling, myalgias and neck stiffness.   Skin: Negative for wound.   Allergic/Immunologic: Negative for immunocompromised state.   Neurological: Positive for headaches. Negative for dizziness, seizures, syncope, weakness and numbness.   Hematological: Negative.    Psychiatric/Behavioral: Negative for agitation, confusion and hallucinations. The patient is not nervous/anxious.    All other systems reviewed and are negative.    Objective:     Vital Signs (Most Recent):  Temp: 98.2 °F (36.8 °C) (01/04/18 0602)  Pulse: 62 (01/04/18 1226)  Resp: 18 (01/04/18 1226)  BP: 122/65 (01/04/18 1202)  SpO2: 99 % (01/04/18 1226) Vital Signs (24h Range):  Temp:  [97.6 °F (36.4 °C)-98.2 °F (36.8 °C)] 98.2 °F (36.8 °C)  Pulse:  [] 62  Resp:  [13-23] 18  SpO2:  [89 %-100 %] 99 %  BP: (101-141)/(58-95) 122/65     Weight: 50.9 kg (112 lb 3.4 oz)  Body mass index is 19.88 kg/m².    Intake/Output Summary (Last 24 hours) at 01/04/18 1245  Last data filed at 01/04/18 0556   Gross per 24 hour   Intake             1315 ml    Output                0 ml   Net             1315 ml      Physical Exam   Constitutional: He is oriented to person, place, and time. He appears well-developed and well-nourished. No distress.   HENT:   Head: Normocephalic and atraumatic.   Mouth/Throat: Oropharynx is clear and moist.   Eyes: Conjunctivae are normal. Pupils are equal, round, and reactive to light. No scleral icterus.   Neck: No JVD present. No thyromegaly present.   Cardiovascular: Normal rate, regular rhythm, normal heart sounds and intact distal pulses.    Pulmonary/Chest: Accessory muscle usage present. He is in respiratory distress (mild). He has no wheezes. He has rales.   Coarse breath sounds bilaterally    Abdominal: Soft. Bowel sounds are normal. He exhibits no distension. There is tenderness. There is no guarding.   Musculoskeletal: Normal range of motion. He exhibits no edema.   Neurological: He is alert and oriented to person, place, and time. No cranial nerve deficit.   Skin: Skin is warm and dry. Capillary refill takes 2 to 3 seconds. No erythema.   Psychiatric: He has a normal mood and affect.   Nursing note and vitals reviewed.      Significant Labs:   Recent Lab Results       01/04/18  0800 01/04/18  0622 01/04/18  0400 01/04/18  0400 01/03/18  2220      Albumin  2.8(L)   3.1(L)     Alkaline Phosphatase     100     Allens Test   Pass       ALT     15     Anion Gap  11   12     Appearance, UA    Clear      AST     24     Baso #  0.00   0.01     Basophil%  0.0   0.1     Bilirubin (UA)    Negative      Total Bilirubin     0.4  Comment:  For infants and newborns, interpretation of results should be based  on gestational age, weight and in agreement with clinical  observations.  Premature Infant recommended reference ranges:  Up to 24 hours.............<8.0 mg/dL  Up to 48 hours............<12.0 mg/dL  3-5 days..................<15.0 mg/dL  6-29 days.................<15.0 mg/dL       Site   LR       BUN, Bld  23   25(H)     Calcium   "8.4(L)   8.8     Chloride  110   109     CO2  21(L)   24     Color, UA    Yellow      CPK     44          44     CPK MB     1.2     Creatinine  1.5(H)   1.5(H)     Diastolic Dysfunction No         DelSys   CPAP/BiPAP       Differential Method  Automated   Automated     EF 60         eGFR if   52(A)   52(A)     eGFR if non   45  Comment:  Calculation used to obtain the estimated glomerular filtration  rate (eGFR) is the CKD-EPI equation.   (A)   45  Comment:  Calculation used to obtain the estimated glomerular filtration  rate (eGFR) is the CKD-EPI equation.   (A)     Eos #  0.0   0.1     Eosinophil%  0.0   0.9     EP   8       FiO2   50       Free T4  1.12        Glucose  180(H)   100     Glucose, UA    Negative      Gran #  5.4   4.0     Gran%  92.2(H)   57.6     Hematocrit  38.4(L)   40.6     Hemoglobin  12.8(L)   13.6(L)     Coumadin Monitoring INR  1.0  Comment:  Coumadin Therapy:  2.0 - 3.0 for INR for all indicators except mechanical heart valves  and antiphospholipid syndromes which should use 2.5 - 3.5.          IP   16       Ketones, UA    Negative      Leukocytes, UA    Negative      Lymph #  0.4(L)   1.9     Lymph%  6.8(L)   27.8     Magnesium  2.4   1.9     MB%     2.7  Comment:  To be positive, the MB% must be greater than 5% AND the CK-MB  greater than 6.5 ng/mL. Values not in the reference interval,   but not qualifying as positive, should be considered "trace".       MCH  30.2   30.4     MCHC  33.3   33.5     MCV  91   91     Mode   BiPAP       Mono #  0.1(L)   1.0     Mono%  1.0(L)   13.6     MPV  9.7   9.7     Mitral Valve Regurgitation MILD         Nitrite, UA    Negative      Occult Blood UA    Negative      Pericardial Effusion TRIVIAL         pH, UA    6.0      Phosphorus  2.3(L)          2.3(L)        Platelets  155   179     POC BE   -1       POC HCO3   24.3       POC PCO2   44.5       POC PH   7.344(L)       POC PO2   189(H)       POC SATURATED O2   100       " Potassium  4.5   4.3     Total Protein     7.3     Protein, UA    Negative  Comment:  Recommend a 24 hour urine protein or a urine   protein/creatinine ratio if globulin induced proteinuria is  clinically suspected.        Protime  10.7        Rate   14       RBC  4.24(L)   4.47(L)     RDW  13.0   13.0     Sample   ARTERIAL       Sodium  142   145     Specific [place], UA    1.015      Specimen UA    Urine, Clean Catch      Troponin I     <0.006  Comment:  The reference interval for Troponin I represents the 99th percentile   cutoff   for our facility and is consistent with 3rd generation assay   performance.       TSH  0.082(L)        Urobilinogen, UA    Negative      WBC  5.89   6.97                   All pertinent labs within the past 24 hours have been reviewed.    Significant Imaging: I have reviewed all pertinent imaging results/findings within the past 24 hours.    Assessment/Plan:      * Asthma exacerbation, non-allergic, moderate persistent    - exacerbated by URI  - severe exacerbation requiring close monitoring in ICU  - was given solumedrol 125mg bolus and duonebs x 3 in ER  - continue solumedrol 80mg q8h, duonebs q4h, moxifloxacin  - give magnesium infusion 2 g over 20 min  - check abg  - titrate bipap for goal O2 sat >93%    1/4: transitioned to NC @3L/MIN via NC O2 sat 99%  --transferred from ICU to telemetry        Acute renal failure    - likely prerenal from decreased renal perfusion  --U/O negative nitrities, negative leukocytes  - strict I/Os  --IVF's- restart finasteride         Paroxysmal atrial flutter    - patient and family reports no history of afib/aflutter, however, patient is on diltiazem  - restart diltiazem at home dose  - check tsh  - CHADvasc 4, will need to be on anticoagulation  - check echo to rule out valvular disease, if negative then will start on apixaban  - start asa for now          Diabetes    - on metformin at home  - hold metformin now  - check Hb a1c, place on low dose  SSI and POCG QAC/QHS           HTN (hypertension)    - takes enalapril 2.5mg daily and diltiazem 120mg daily at home.  - hold enalapril for now due to ARF  - restart diltiazem, would also be beneficial in the setting of atrial flutter/Afib           VTE Risk Mitigation         Ordered     apixaban tablet 2.5 mg  2 times daily     Route:  Oral        01/04/18 1237     Medium Risk of VTE  Once      01/04/18 0244              MOY Moura-C  Department of Hospital Medicine   [facility]

## 2018-01-05 NOTE — CARE UPDATE
Patient awake resting in bed, daughter Brisa at bedside will go to work shortly and call for updates.  Her number is also on the white board in patient room. Vitals entered in Epic, po meds given this am, waiting on pharmacy delivery of IV medication new order.  100% on 3 L NC with extension in place for patient ambulation.  Urinal and personals at bedside.  Lab acquired sample during morning assessment of patient.

## 2018-01-06 PROBLEM — K59.00 CONSTIPATION: Status: ACTIVE | Noted: 2018-01-06

## 2018-01-06 NOTE — PROGRESS NOTES
Discharge instructions given to patient and Daughter Brisa. Verbalized understanding. Monitor removed. Home oxygen at bedside. Patient off floor via wheelchair.

## 2018-01-06 NOTE — PLAN OF CARE
Problem: Patient Care Overview  Goal: Plan of Care Review  Outcome: Ongoing (interventions implemented as appropriate)  Report taken from Karena BARKER around 1500. Rafia used for translation for discharge instructions and education. Patient states he wants to wait until his daughter arrives and for her to be interrupter. Interrputer name Danielle #[id number]. 12 hour chart check completed.

## 2018-01-06 NOTE — PROGRESS NOTES
[facility] -   Pulmonology  Progress Note    Patient Name: Baltazar Garcia  MRN: [id number]  Admission Date: 1/3/2018  Hospital Length of Stay: 1 days  Code Status: Full Code  Attending Provider: Lyndon Colindres MD  Primary Care Provider: Josué Faith MD   Principal Problem: Asthma exacerbation, non-allergic, moderate persistent    Subjective:feels better     Past Medical History:   Diagnosis Date    Asthma     Diabetes mellitus     Hypertension     Tuberculosis 2002    Treated       History reviewed. No pertinent surgical history.    Review of patient's allergies indicates:  No Known Allergies    Family History     Family history is unknown by patient.        Social History Main Topics    Smoking status: Former Smoker     Quit date: 1997    Smokeless tobacco: Never Used    Alcohol use No    Drug use: No    Sexual activity: No         Review of Systems   Constitutional: Positive for fatigue.   HENT: Positive for congestion, postnasal drip and rhinorrhea.    Respiratory: Positive for cough, chest tightness, shortness of breath, wheezing and stridor.    Cardiovascular: Positive for palpitations.     Objective:     Vital Signs (Most Recent):  Temp: 97.6 °F (36.4 °C) (01/05/18 1215)  Pulse: 74 (01/05/18 1320)  Resp: 20 (01/05/18 1320)  BP: 123/60 (01/05/18 1215)  SpO2: 98 % (01/05/18 1215) Vital Signs (24h Range):  Temp:  [96.2 °F (35.7 °C)-98.4 °F (36.9 °C)] 97.6 °F (36.4 °C)  Pulse:  [70-92] 74  Resp:  [17-20] 20  SpO2:  [95 %-100 %] 98 %  BP: (115-147)/(59-70) 123/60     Weight: 50.9 kg (112 lb 3.4 oz)  Body mass index is 19.88 kg/m².      Intake/Output Summary (Last 24 hours) at 01/05/18 1827  Last data filed at 01/05/18 1124   Gross per 24 hour   Intake              600 ml   Output                0 ml   Net              600 ml       Physical Exam   Constitutional: He is oriented to person, place, and time. He appears well-developed and well-nourished.   HENT:   Head: Normocephalic and atraumatic.    Mouth/Throat: Oropharyngeal exudate present.   Eyes: Conjunctivae are normal. Pupils are equal, round, and reactive to light.   Neck: Neck supple. No JVD present. No tracheal deviation present. No thyromegaly present.   Cardiovascular: Normal rate, regular rhythm and normal heart sounds.    No murmur heard.  Pulmonary/Chest: Effort normal. He has decreased breath sounds. He has wheezes in the right lower field and the left lower field. He has no rhonchi. He has no rales.   Abdominal: Soft. Bowel sounds are normal.   Musculoskeletal: Normal range of motion. He exhibits no edema or tenderness.   Lymphadenopathy:     He has no cervical adenopathy.   Neurological: He is alert and oriented to person, place, and time.   Skin: Skin is warm and dry.   Nursing note and vitals reviewed.      Vents:  Oxygen Concentration (%): 32 (01/05/18 0105)    Lines/Drains/Airways          No matching active lines, drains, or airways          Significant Labs:    CBC/Anemia Profile:    Recent Labs  Lab 01/03/18 2220 01/04/18  0622 01/05/18  0332   WBC 6.97 5.89 10.85   HGB 13.6* 12.8* 12.2*   HCT 40.6 38.4* 37.1*    155 214   MCV 91 91 91   RDW 13.0 13.0 13.1        Chemistries:    Recent Labs  Lab 01/03/18 2220 01/04/18  0622 01/05/18  0332    142 144   K 4.3 4.5 4.5    110 111*   CO2 24 21* 23   BUN 25* 23 27*   CREATININE 1.5* 1.5* 1.5*   CALCIUM 8.8 8.4* 8.2*   ALBUMIN 3.1* 2.8* 2.7*   PROT 7.3  --   --    BILITOT 0.4  --   --    ALKPHOS 100  --   --    ALT 15  --   --    AST 24  --   --    MG 1.9 2.4 2.3   PHOS  --  2.3*  2.3* 2.2*  2.2*       BMP:     Recent Labs  Lab 01/05/18  0332   *      K 4.5   *   CO2 23   BUN 27*   CREATININE 1.5*   CALCIUM 8.2*   MG 2.3     CMP:     Recent Labs  Lab 01/03/18  2220 01/04/18  0622 01/05/18  0332    142 144   K 4.3 4.5 4.5    110 111*   CO2 24 21* 23    180* 172*   BUN 25* 23 27*   CREATININE 1.5* 1.5* 1.5*   CALCIUM 8.8 8.4* 8.2*    PROT 7.3  --   --    ALBUMIN 3.1* 2.8* 2.7*   BILITOT 0.4  --   --    ALKPHOS 100  --   --    AST 24  --   --    ALT 15  --   --    ANIONGAP 12 11 10   EGFRNONAA 45* 45* 45*     All pertinent labs within the past 24 hours have been reviewed.    Significant Imaging:   CXR: I have reviewed all pertinent results/findings within the past 24 hours and my personal findings are:  bilateral apical scarring - stable    Assessment/Plan:     * Asthma exacerbation, non-allergic, moderate persistent    1/5 continue oral meds as outpatient          Recommendations for discharge:    Prednisone 60 mg/ day for 3 days, 40 mg/day for 3 days,20 mg/ day for 3 days, 10 mg a day for 3 days.  Antibiotic levaquin  Order DURABLE MEDICAL EQUIPMENT - nebulizer and nebulizer supplies    ipratroprium jet nebs qid  Referral to pulmonary disease management  Follow up in Pulmonary Clinic in 1-2 weeks  Thanks     Davon Iverson MD  Pulmonology  [facility]

## 2018-01-07 NOTE — DISCHARGE SUMMARY
[facility] Hospital Medicine  Discharge Summary      Patient Name: Baltazar Garcia  MRN: [id number]  Admission Date: 1/3/2018  Hospital Length of Stay: 1 days  Discharge Date: 1/5/18  Attending Physician: Dr. Colindres   Discharging Provider: Mila Loya NP  Primary Care Provider: Josué Faith MD      HPI:   78M h/o asthma presents with cough and sob.  The patient reports cough started after coming home from recent travel. He presented to his PCP who started the patient on zpack.  Cough never completely resolved.     His sob acutely worsened today which alerted the patient's daughter to bring the patient to the hospital.  He uses albuterol inhaler twice a day and has not been increasing the usage of inhaler.   Denies sick contacts.  Does report recent travel out of town.  No other exacerbating or alleviating factors.     Pertinent ER course  Patient was found to by mildly tachycardic in ER with O2 sat 98% on NC .  He was given 3 albuterol treatments and improved respiratory status.  However, patient went into aflutter/afib when he was using the restroom.  EKG showed aflutter. Flutter rate>250.  O2 found to be 89% at that time.  He was placed on Bipap and given IV solumedrol 125mg bolus. He was also bolused with diltiazem and placed on diltiazem gtt.  After about 40 minutes, the patient converted to NSR .  He was able to be titrated off of diltiazem gtt.     * No surgery found *      Hospital Course:   The pt was admitted for Asthma exacerbation and Aflutter.He was initially admitted to ICU on Bipap. Patient was given IV steroids, abx, PO,  and breathing tx. Breathing improved and pt was transitioned from bipap to NC. He qualified for home oxygen which was arranged. He will be discharged on prednisone taper, Avelox, and a home nebulizer with neb txs. He will follow up with Pulmonology.   Aflutter converted to NSR. Echo showed Normal LVF.  The pt was continued on Diltiazem and Coreg. CHADvasc 4. The pt was  placed on Eliquis. He will follow up with Cardiology.   Pt reported constipation. Abd Xray showed ileus versus stool impaction. The pt was given enemas and placed on Miralax. Pt had large BMs. Repeat Abd Xray showed resolution of fecal impaction.  The pt was seen and examined today and determined to be stable for discharge.       Consults:   Consults         Status Ordering Provider     Inpatient consult to Pulmonology  Once     Provider:  Davon Iverson MD    Completed JAMISON OLIVERA     Inpatient consult to Social Work  Once     Provider:  (Not yet assigned)    Completed NOEL ROBINS     Inpatient consult to Social Work  Once     Provider:  (Not yet assigned)    Completed NOEL ROBINS            Final Active Diagnoses:    Diagnosis Date Noted POA    PRINCIPAL PROBLEM:  Asthma exacerbation, non-allergic, moderate persistent [J45.41] 01/04/2018 Yes    Acute bronchitis [J20.9] 01/04/2018 Yes    COPD exacerbation [J44.1] 01/04/2018 Yes    Paroxysmal atrial flutter [I48.92] 01/04/2018 Yes    Constipation [K59.00] 01/06/2018 Yes    Acute renal failure [N17.9] 01/04/2018 Yes    HTN (hypertension) [I10] 06/08/2015 Yes    Diabetes [E11.9] 06/08/2015 Yes      Problems Resolved During this Admission:    Diagnosis Date Noted Date Resolved POA       Discharged Condition: stable    Disposition: Home-Health Care [facility]    Follow Up:  Follow-up Information     Davon Iverson MD In 1 week.    Specialty:  Pulmonary Disease  Why:  reivew progress  Contact information:  [address]  [place] [address] [phone]             Josué Faith MD In 3 days.    Specialty:  Cardiology  Why:  Patient to make non [facility] apt.   Contact information:  [address] [place] [id number] [phone]             [facility].    Specialty:  Home Health Services  Why:  Home Health  Contact information:  [address]  [place] [id number] [phone]              "[facility].    Specialty:  DME Provider  Why:  DME  Contact information:  [address] [facility] [address] [phone]             Davon Iverson MD In 2 weeks.    Specialty:  Pulmonary Disease  Why:  review of progress  Contact information:  [address]  [place] [address] [phone]                 Patient Instructions:     NEBULIZER FOR HOME USE   Order Comments: [facility] for CPAP/Oxygen/Nebulizer supplies.  Customer Service: [phone]  Call: [phone]  Fax: [phone]  Billing Inquiries: [phone] or [phone]     Order Specific Question Answer Comments   Height: 5' 3" (1.6 m)    Weight: 50.9 kg (112 lb 3.4 oz)    Length of need (1-99 months): 99    Vendor: [facility]    Expected Date of Delivery: 1/12/2018      NEBULIZER KIT (SUPPLIES) FOR HOME USE   Order Specific Question Answer Comments   Height: 5' 3" (1.6 m)    Weight: 50.9 kg (112 lb 3.4 oz)    Length of need (1-99 months): 99    Mask or Mouthpiece? Mouthpiece    Vendor: [facility]    Expected Date of Delivery: 1/12/2018      OXYGEN FOR HOME USE   Order Specific Question Answer Comments   Liter Flow 2    Duration Continuous    Qualifying SpO2: 86    Testing done at: Exercise/Activity    Route nasal cannula    Device home concentrator with portable unit    Height: 5' 3" (1.6 m)    Weight: 50.9 kg (112 lb 3.4 oz)    Does patient have medical equipment at home? none    Alternative treatment measures have been tried or considered and deemed clinically ineffective. Yes      Ambulatory consult to Pulmonology   Referral Priority: Routine Referral Type: Consultation   Referral Reason: Specialty Services Required    Requested Specialty: Pulmonary Disease    Number of Visits Requested: 1      Activity as tolerated         Significant Diagnostic Studies:  Imaging Results          X-Ray Abdomen Portable (Final result)  Result time 01/05/18 11:29:08    Final result by Marquis Begum MD (01/05/18 11:29:08)     "             Impression:     Nonobstructive bowel gas pattern.  No evidence of fecal impaction on the current study.      Electronically signed by: NAYAN HATCH MD  Date:     01/05/18  Time:    11:29              Narrative:    History: Fecal impaction    Comparison: 1/5/18    Result: Two view abdomen    Nonobstructive bowel gas pattern is noted. No radiopaque kidney calculus is identified.    No evidence of organomegaly.  The bones demonstrate mild degenerative changes within the lower lumbar region.  The bones are otherwise intact.                             X-Ray Chest 1 View (Final result)  Result time 01/05/18 08:13:46    Final result by Adam Butcher MD (01/05/18 08:13:46)                 Impression:      Scarring within the right and left upper lungs with volume loss and superior retraction of the hilar regions.  Most marked on the right.    Left basilar atelectasis versus scarring.    Small left pleural effusion versus scarring.    Emphysematous changes suspected.    Interstitial coarsening.  Fibrosis versus subtle interstitial edema..    Please see above.    Typed report submitted by the on call radiology service.      Electronically signed by: ADAM BUTCHER MD  Date:     01/05/18  Time:    08:13              Narrative:    EXAM: Chest X-ray AP 1 view    CLINICAL HISTORY:     Chest pain, unspecified, without mention of trauma.    COMPARISON STUDIES:     Chest x-ray 01/03/2018, 05/30/2016 and CT chest 05/30/2016.    FINDINGS:    Normal heart size.  Emphysematous changes suspected.  There is superior retraction of the right and left hilar regions.  Prominence of the right main pulmonary artery.  There is pleural-parenchymal thickening within the right upper lung and left pulmonary apex most marked on the right.  Interstitial coarsening within both lungs.  Left basilar opacity..  Small left pleural effusion versus scarring.   Ribs appear intact.                             X-Ray Abdomen AP 1 View (Final  result)  Result time 01/05/18 08:29:15    Final result by Adam Butcher MD (01/05/18 08:29:15)                 Impression:      Bowel gas pattern as described above.  Findings may be due to ileus or impacted feces.  Followup suggested.    Other findings as described above.    Typed report submitted by the radiology on call service..      Electronically signed by: ADAM BUTCHER MD  Date:     01/05/18  Time:    08:29              Narrative:    EXAM: Abdomen one view      CLINICAL HISTORY: abdominal pain    COMPARISON STUDIES: Constipation 01/04/2018.    FINDINGS:    Abdomen: There is feces demonstrated within the colon most marked in the left colon and the rectum.  Air-filled transverse colon and splenic flexure.  Bowel gas pattern is otherwise unremarkable.  No obvious free air on this supine view.  Small density within the left upper quadrant appears stable may be within the bowel, calcification or small foreign body.  This measures approximately 2 mm in diameter.  There is mild elevation of the left hemidiaphragm.. .                             X-Ray Abdomen AP 1 View (Final result)  Result time 01/04/18 13:19:19    Final result by Ludy An MD (01/04/18 13:19:19)                 Impression:     Question constipation.      Electronically signed by: LUDY AN MD  Date:     01/04/18  Time:    13:19              Narrative:    KUB    History:     Abdominal pain    Findings:     Bowel gas pattern appears normal.  Increased stool in the colon. No soft tissue or osseous abnormality is identified.                             X-Ray Chest PA And Lateral (Final result)  Result time 01/04/18 00:17:11    Final result by Davis Finnegan MD (01/04/18 00:17:11)                 Impression:     Stable abnormal chest x-ray.      Electronically signed by: DAVIS FINNEGAN  Date:     01/04/18  Time:    00:17              Narrative:    Chest x-ray 2 view    Indication: Shortness of breath.    Findings: Comparison study 5/30/2016.   No change.  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  There is scattered mild scarring elsewhere.  No active infiltrate or pleural fluid collection.  Normal size heart.                             RADIOLOGY REPORT (Final result)  Result time 01/05/18 13:58:15                Pending Diagnostic Studies:     None         Medications:  Reconciled Home Medications:   Discharge Medication List as of 1/5/2018  4:48 PM      START taking these medications    Details   apixaban 2.5 mg Tab Take 1 tablet (2.5 mg total) by mouth 2 (two) times daily., Starting Fri 1/5/2018, Normal      aspirin (ECOTRIN) 81 MG EC tablet Take 1 tablet (81 mg total) by mouth once daily., Starting Sat 1/6/2018, Until Sun 1/6/2019, OTC      budesonide (PULMICORT) 0.5 mg/2 mL nebulizer solution Take 2 mLs (0.5 mg total) by nebulization every 12 (twelve) hours. Controller, Starting Fri 1/5/2018, Until Sat 1/5/2019, Normal      ipratropium (ATROVENT) 0.02 % nebulizer solution Take 2.5 mLs (500 mcg total) by nebulization every 6 (six) hours. Rescue, Starting Fri 1/5/2018, Until Sat 1/5/2019, Normal      levalbuterol (XOPENEX) 0.63 mg/3 mL nebulizer solution Take 3 mLs (0.63 mg total) by nebulization every 12 (twelve) hours. Rescue, Starting Fri 1/5/2018, Until Sat 1/5/2019, Normal      moxifloxacin (AVELOX) 400 mg tablet Take 1 tablet (400 mg total) by mouth once daily., Starting Fri 1/5/2018, Until Wed 1/10/2018, Normal      pantoprazole (PROTONIX) 40 MG tablet Take 1 tablet (40 mg total) by mouth once daily., Starting Sat 1/6/2018, Until Mon 2/5/2018, Normal      polyethylene glycol (GLYCOLAX) 17 gram PwPk Take 17 g by mouth once daily., Starting Sat 1/6/2018, Normal      predniSONE (DELTASONE) 20 MG tablet Take 60mg oral daily x 3 days, 40mg dily x 3 days, 20mg daily x 3 days, 10mg daily x 3 days, Normal         CONTINUE these medications which have NOT CHANGED    Details   albuterol 90  mcg/actuation inhaler Inhale 1-2 puffs into the lungs every 6 (six) hours as needed for Wheezing., Starting 5/30/2016, Until Discontinued, Print      carvedilol (COREG) 3.125 MG tablet Take 1 tablet (3.125 mg total) by mouth 2 (two) times daily with meals., Starting Fri 6/12/2015, Until Wed 1/3/2018, Print      diltiazem (CARDIZEM CD) 120 MG Cp24 Take 1 capsule (120 mg total) by mouth once daily. Call PCP for refills, Starting Fri 6/12/2015, Until Wed 1/3/2018, Print      finasteride (PROSCAR) 5 mg tablet Take 1 tablet (5 mg total) by mouth once daily. Call PCP for refills, Starting Fri 6/12/2015, Until Wed 1/3/2018, Print      hydrocodone-chlorpheniramine (TUSSIONEX) 10-8 mg/5 mL suspension Take 5 mLs by mouth every 12 (twelve) hours as needed for Cough., Historical Med      levocetirizine (XYZAL) 5 MG tablet Take 5 mg by mouth every evening., Until Discontinued, Historical Med      lovastatin (MEVACOR) 10 MG tablet Take 10 mg by mouth every evening., Until Discontinued, Historical Med      metformin (GLUCOPHAGE) 500 MG tablet Take 500 mg by mouth 2 (two) times daily with meals., Until Discontinued, Historical Med      tamsulosin (FLOMAX) 0.4 mg Cp24 Take 1 capsule (0.4 mg total) by mouth once daily. Call PCP for refills, Starting Fri 6/12/2015, Until Wed 1/3/2018, Print         STOP taking these medications       enalapril (VASOTEC) 2.5 MG tablet Comments:   Reason for Stopping:         meloxicam (MOBIC) 7.5 MG tablet Comments:   Reason for Stopping:         methylPREDNISolone (MEDROL DOSEPACK) 4 mg tablet Comments:   Reason for Stopping:               Indwelling Lines/Drains at time of discharge:   Lines/Drains/Airways          No matching active lines, drains, or airways          Time spent on the discharge of patient: 44 minutes  Patient was seen and examined on the date of discharge and determined to be suitable for discharge.         Mila Loya NP  Department of Hospital Medicine  [facility]

## 2018-01-08 NOTE — PLAN OF CARE
01/08/18 0821   Final Note   Assessment Type Final Discharge Note   Discharge Disposition Home-Health  ([facility])

## 2018-01-17 ENCOUNTER — OFFICE VISIT (OUTPATIENT)
Dept: PULMONOLOGY | Facility: CLINIC | Age: 78
End: 2018-01-17
Payer: MEDICARE

## 2018-01-17 VITALS
OXYGEN SATURATION: 98 % | WEIGHT: 114 LBS | BODY MASS INDEX: 20.2 KG/M2 | HEART RATE: 78 BPM | DIASTOLIC BLOOD PRESSURE: 64 MMHG | HEIGHT: 63 IN | RESPIRATION RATE: 18 BRPM | SYSTOLIC BLOOD PRESSURE: 110 MMHG

## 2018-01-17 DIAGNOSIS — J44.9 CHRONIC OBSTRUCTIVE PULMONARY DISEASE, UNSPECIFIED COPD TYPE: Primary | ICD-10-CM

## 2018-01-17 DIAGNOSIS — I48.92 PAROXYSMAL ATRIAL FLUTTER: ICD-10-CM

## 2018-01-17 DIAGNOSIS — G47.00 INSOMNIA, UNSPECIFIED TYPE: ICD-10-CM

## 2018-01-17 PROCEDURE — 99214 OFFICE O/P EST MOD 30 MIN: CPT | Mod: PBBFAC,PO | Performed by: INTERNAL MEDICINE

## 2018-01-17 PROCEDURE — 99215 OFFICE O/P EST HI 40 MIN: CPT | Mod: S$PBB,,, | Performed by: INTERNAL MEDICINE

## 2018-01-17 PROCEDURE — 99999 PR PBB SHADOW E&M-EST. PATIENT-LVL IV: CPT | Mod: PBBFAC,,, | Performed by: INTERNAL MEDICINE

## 2018-01-17 RX ORDER — ALBUTEROL SULFATE 90 UG/1
1-2 AEROSOL, METERED RESPIRATORY (INHALATION) EVERY 6 HOURS PRN
Qty: 1 INHALER | Refills: 11 | Status: SHIPPED | OUTPATIENT
Start: 2018-01-17 | End: 2018-08-07 | Stop reason: SDUPTHER

## 2018-01-17 RX ORDER — FLUTICASONE PROPIONATE 50 MCG
SPRAY, SUSPENSION (ML) NASAL
COMMUNITY
Start: 2017-12-11 | End: 2019-05-07

## 2018-01-17 RX ORDER — ALBUTEROL SULFATE 0.83 MG/ML
2.5 SOLUTION RESPIRATORY (INHALATION) EVERY 6 HOURS PRN
Qty: 360 ML | Refills: 11 | Status: SHIPPED | OUTPATIENT
Start: 2018-01-17 | End: 2018-02-02 | Stop reason: SDUPTHER

## 2018-01-17 RX ORDER — CARVEDILOL 3.12 MG/1
3.12 TABLET ORAL 2 TIMES DAILY WITH MEALS
Qty: 60 TABLET | Refills: 11 | Status: SHIPPED | OUTPATIENT
Start: 2018-01-17 | End: 2019-01-17

## 2018-01-17 RX ORDER — OMEPRAZOLE 20 MG/1
CAPSULE, DELAYED RELEASE ORAL
COMMUNITY
Start: 2017-12-11 | End: 2019-05-07

## 2018-01-17 RX ORDER — POLYETHYLENE GLYCOL 3350 17 G/17G
POWDER, FOR SOLUTION ORAL
COMMUNITY
Start: 2018-01-05

## 2018-01-17 RX ORDER — DOXEPIN HYDROCHLORIDE 10 MG/1
10 CAPSULE ORAL NIGHTLY PRN
Qty: 30 CAPSULE | Refills: 11 | Status: SHIPPED | OUTPATIENT
Start: 2018-01-17 | End: 2019-01-17

## 2018-01-17 RX ORDER — AMLODIPINE BESYLATE 10 MG/1
TABLET ORAL
COMMUNITY
Start: 2017-12-11 | End: 2018-01-17 | Stop reason: ALTCHOICE

## 2018-01-17 RX ORDER — LOSARTAN POTASSIUM 100 MG/1
TABLET ORAL
COMMUNITY
Start: 2017-12-14 | End: 2019-05-07

## 2018-01-17 RX ORDER — DILTIAZEM HYDROCHLORIDE 120 MG/1
120 CAPSULE, COATED, EXTENDED RELEASE ORAL DAILY
Qty: 30 CAPSULE | Refills: 11 | Status: SHIPPED | OUTPATIENT
Start: 2018-01-17 | End: 2018-11-20 | Stop reason: SDUPTHER

## 2018-01-17 NOTE — PHYSICIAN QUERY
"PT Name: Baltazar Garcia  MR #: [id number]    Physician Query Form - Atrial Flutter Specificity Clarification     CDS/: Lory Orantes RN CDI      Contact information: [phone]  This form is a permanent document in the medical record.     Query Date: January 17, 2018    By submitting this query, we are merely seeking further clarification of documentation. Please utilize your independent clinical judgment when addressing the question(s) below.    The medical record contains the following:   Indicators     Supporting Clinical Findings Location in Medical Record   X Atrial Flutter Patient was found to by mildly tachycardic in ER with O2 sat 98% on NC .  He was given 3 albuterol treatments and improved respiratory status.       However, patient went into aflutter/afib when he was using the restroom.  EKG showed aflutter. Flutter rate>250.      O2 found to be 89% at that time.  He was placed on Bipap and given IV solumedrol 125mg bolus.    H&P   X EKG results Atrial Flutter - However, patient went into aflutter/afib when he was using the restroom.  EKG showed aflutter. Flutter rate>250.   H&P    X Medication  He was also bolused with diltiazem and placed on diltiazem gtt.   After about 40 minutes, the patient converted to NSR .    He was able to be titrated off of diltiazem gtt.   Diltazem 120 mg Daily H&P      Medicines    Treatment      Other       Provider, please further specify the "Atrial Flutter" diagnosis.    [  ] Atypical  [  ] Type I  [  ] Type II  [  ] Typical  [  ] Other (please specify): ___________________________________  [ x ] Clinically Undetermined    Please document in your progress notes daily for the duration of treatment until resolved, and include in your discharge summary.    "

## 2018-01-17 NOTE — PROGRESS NOTES
Subjective:       Patient ID: Baltazar Garcia is a 78 y.o. male.  The patient visit was accomplished by using her daughter as a Swazi  throughout the office visit    Chief Complaint:   He   presents for evaluation and treatment of Chronic Obstructive Pulmonary Disease  after being discharged from the hospital  10  days ago. Since discharge symptoms have gradually improving course since that time. Patient denies fever or chills, , no chest pain.  Still with night sweats. Symptoms are aggravated by activity. Symptoms improve with rest and opxygen.  Respiratory: positive for cough and dyspnea on exertion; Cardiovascular: no chest pain or palpitations.  Patient currently is on oxygen at 2 L/min per nasal cannula..]  Overall much improved  Fingers are numb and tingling all the time since strarting antibiotic. No numbness in feet  Has not finished antibiotics    MEDICAL RECORDS FROM THE HOSPITAL REVIEWED:        [facility] Medicine  Discharge Summary        Patient Name: Baltazar Garcia  MRN: [id number]  Admission Date: 1/3/2018  Hospital Length of Stay: 1 days  Discharge Date: 1/5/18  Attending Physician: Dr. Colindres   Discharging Provider: Mila Loya NP  Primary Care Provider: Josué Faith MD        HPI:   78M h/o asthma presents with cough and sob.  The patient reports cough started after coming home from recent travel. He presented to his PCP who started the patient on zpack.  Cough never completely resolved.     His sob acutely worsened today which alerted the patient's daughter to bring the patient to the hospital.  He uses albuterol inhaler twice a day and has not been increasing the usage of inhaler.   Denies sick contacts.  Does report recent travel out of town.  No other exacerbating or alleviating factors.      Pertinent ER course  Patient was found to by mildly tachycardic in ER with O2 sat 98% on NC .  He was given 3 albuterol treatments and improved respiratory status.   However, patient went into aflutter/afib when he was using the restroom.  EKG showed aflutter. Flutter rate>250.  O2 found to be 89% at that time.  He was placed on Bipap and given IV solumedrol 125mg bolus. He was also bolused with diltiazem and placed on diltiazem gtt.  After about 40 minutes, the patient converted to NSR .  He was able to be titrated off of diltiazem gtt.      * No surgery found *       Hospital Course:   The pt was admitted for Asthma exacerbation and Aflutter.He was initially admitted to ICU on Bipap. Patient was given IV steroids, abx, PO,  and breathing tx. Breathing improved and pt was transitioned from bipap to NC. He qualified for home oxygen which was arranged. He will be discharged on prednisone taper, Avelox, and a home nebulizer with neb txs. He will follow up with Pulmonology.   Aflutter converted to NSR. Echo showed Normal LVF.  The pt was continued on Diltiazem and Coreg. CHADvasc 4. The pt was placed on Eliquis. He will follow up with Cardiology.   Pt reported constipation. Abd Xray showed ileus versus stool impaction. The pt was given enemas and placed on Miralax. Pt had large BMs. Repeat Abd Xray showed resolution of fecal impaction.  The pt was seen and examined today and determined to be stable for discharge.             COPD and brochitis    HPI  Past Medical History:   Diagnosis Date    Asthma     Diabetes mellitus     Hypertension     Tuberculosis 2002    Treated     History reviewed. No pertinent surgical history.  Social History     Social History    Marital status:      Spouse name: N/A    Number of children: N/A    Years of education: N/A     Occupational History    Not on file.     Social History Main Topics    Smoking status: Former Smoker     Quit date: 1997    Smokeless tobacco: Never Used    Alcohol use No    Drug use: No    Sexual activity: No     Other Topics Concern    Not on file     Social History Narrative    No narrative on file      Review of Systems   Constitutional: Positive for fatigue. Negative for fever.   HENT: Positive for postnasal drip, rhinorrhea and congestion.    Eyes: Negative for redness and itching.   Respiratory: Positive for cough, sputum production, shortness of breath, dyspnea on extertion, use of rescue inhaler and Paroxysmal Nocturnal Dyspnea.    Cardiovascular: Negative for chest pain, palpitations and leg swelling.   Genitourinary: Negative for difficulty urinating and hematuria.   Endocrine: Negative for cold intolerance and heat intolerance.    Skin: Negative for rash.   Gastrointestinal: Negative for nausea and abdominal pain.   Neurological: Negative for dizziness, syncope, weakness and light-headedness.   Hematological: Negative for adenopathy. Does not bruise/bleed easily.   Psychiatric/Behavioral: Negative for sleep disturbance. The patient is not nervous/anxious.        Objective:      Physical Exam   Constitutional: He is oriented to person, place, and time. He appears well-developed and well-nourished.   HENT:   Head: Normocephalic and atraumatic.   Mouth/Throat: Oropharyngeal exudate present.   Eyes: Conjunctivae are normal. Pupils are equal, round, and reactive to light.   Neck: Neck supple. No JVD present. No tracheal deviation present. No thyromegaly present.   Cardiovascular: Normal rate, regular rhythm and normal heart sounds.    No murmur heard.  Pulmonary/Chest: Effort normal. He has decreased breath sounds. He has wheezes in the right lower field and the left lower field. He has no rhonchi. He has no rales.   Abdominal: Soft. Bowel sounds are normal.   Musculoskeletal: Normal range of motion. He exhibits no edema or tenderness.   Lymphadenopathy:     He has no cervical adenopathy.   Neurological: He is alert and oriented to person, place, and time.   Skin: Skin is warm and dry.   Nursing note and vitals reviewed.    Personal Diagnostic Review  Chest x-ray: hyperinflation , bilateral upper lobe  scarring      .GMGPFTNEW  No flowsheet data found.        Assessment:       1. Chronic obstructive pulmonary disease, unspecified COPD type    2. Insomnia, unspecified type    3. Paroxysmal atrial flutter        Outpatient Encounter Prescriptions as of 1/17/2018   Medication Sig Dispense Refill    albuterol 90 mcg/actuation inhaler Inhale 1-2 puffs into the lungs every 6 (six) hours as needed for Wheezing. 1 Inhaler 11    apixaban 2.5 mg Tab Take 1 tablet (2.5 mg total) by mouth 2 (two) times daily. 60 tablet 0    aspirin (ECOTRIN) 81 MG EC tablet Take 1 tablet (81 mg total) by mouth once daily.  0    fluticasone (FLONASE) 50 mcg/actuation nasal spray       hydrocodone-chlorpheniramine (TUSSIONEX) 10-8 mg/5 mL suspension Take 5 mLs by mouth every 12 (twelve) hours as needed for Cough.      levocetirizine (XYZAL) 5 MG tablet Take 5 mg by mouth every evening.      losartan (COZAAR) 100 MG tablet       lovastatin (MEVACOR) 10 MG tablet Take 10 mg by mouth every evening.      metformin (GLUCOPHAGE) 500 MG tablet Take 500 mg by mouth 2 (two) times daily with meals.      omeprazole (PRILOSEC) 20 MG capsule       pantoprazole (PROTONIX) 40 MG tablet Take 1 tablet (40 mg total) by mouth once daily. 30 tablet 0    polyethylene glycol (GLYCOLAX) 17 gram PwPk Take 17 g by mouth once daily. 30 packet 0    polyethylene glycol (GLYCOLAX) 17 gram/dose powder       [DISCONTINUED] albuterol 90 mcg/actuation inhaler Inhale 1-2 puffs into the lungs every 6 (six) hours as needed for Wheezing. 1 Inhaler 0    [DISCONTINUED] amLODIPine (NORVASC) 10 MG tablet       [DISCONTINUED] budesonide (PULMICORT) 0.5 mg/2 mL nebulizer solution Take 2 mLs (0.5 mg total) by nebulization every 12 (twelve) hours. Controller 120 mL 0    [DISCONTINUED] ipratropium (ATROVENT) 0.02 % nebulizer solution Take 2.5 mLs (500 mcg total) by nebulization every 6 (six) hours. Rescue 2 Box 0    [DISCONTINUED] levalbuterol (XOPENEX) 0.63 mg/3 mL  nebulizer solution Take 3 mLs (0.63 mg total) by nebulization every 12 (twelve) hours. Rescue 1 Box 0    [DISCONTINUED] predniSONE (DELTASONE) 20 MG tablet Take 60mg oral daily x 3 days, 40mg dily x 3 days, 20mg daily x 3 days, 10mg daily x 3 days 21 tablet 0    albuterol (PROVENTIL) 2.5 mg /3 mL (0.083 %) nebulizer solution Take 3 mLs (2.5 mg total) by nebulization every 6 (six) hours as needed for Wheezing or Shortness of Breath. 360 mL 11    carvedilol (COREG) 3.125 MG tablet Take 1 tablet (3.125 mg total) by mouth 2 (two) times daily with meals. 60 tablet 11    diltiaZEM (CARDIZEM CD) 120 MG Cp24 Take 1 capsule (120 mg total) by mouth once daily. Call PCP for refills 30 capsule 11    doxepin (SINEQUAN) 10 MG capsule Take 1 capsule (10 mg total) by mouth nightly as needed (insomnia). 30 capsule 11    finasteride (PROSCAR) 5 mg tablet Take 1 tablet (5 mg total) by mouth once daily. Call PCP for refills 30 tablet 0    tamsulosin (FLOMAX) 0.4 mg Cp24 Take 1 capsule (0.4 mg total) by mouth once daily. Call PCP for refills 30 capsule 0    [DISCONTINUED] carvedilol (COREG) 3.125 MG tablet Take 1 tablet (3.125 mg total) by mouth 2 (two) times daily with meals. 60 tablet 0    [DISCONTINUED] diltiazem (CARDIZEM CD) 120 MG Cp24 Take 1 capsule (120 mg total) by mouth once daily. Call PCP for refills 30 capsule 0     No facility-administered encounter medications on file as of 1/17/2018.      Orders Placed This Encounter   Procedures    Six Minute Walk Test to qualify for Home Oxygen     Standing Status:   Future     Standing Expiration Date:   1/17/2019     Plan:       Requested Prescriptions     Signed Prescriptions Disp Refills    albuterol (PROVENTIL) 2.5 mg /3 mL (0.083 %) nebulizer solution 360 mL 11     Sig: Take 3 mLs (2.5 mg total) by nebulization every 6 (six) hours as needed for Wheezing or Shortness of Breath.    diltiaZEM (CARDIZEM CD) 120 MG Cp24 30 capsule 11     Sig: Take 1 capsule (120 mg total)  by mouth once daily. Call PCP for refills    carvedilol (COREG) 3.125 MG tablet 60 tablet 11     Sig: Take 1 tablet (3.125 mg total) by mouth 2 (two) times daily with meals.    albuterol 90 mcg/actuation inhaler 1 Inhaler 11     Sig: Inhale 1-2 puffs into the lungs every 6 (six) hours as needed for Wheezing.    doxepin (SINEQUAN) 10 MG capsule 30 capsule 11     Sig: Take 1 capsule (10 mg total) by mouth nightly as needed (insomnia).     Chronic obstructive pulmonary disease, unspecified COPD type  -     albuterol (PROVENTIL) 2.5 mg /3 mL (0.083 %) nebulizer solution; Take 3 mLs (2.5 mg total) by nebulization every 6 (six) hours as needed for Wheezing or Shortness of Breath.  Dispense: 360 mL; Refill: 11  -     albuterol 90 mcg/actuation inhaler; Inhale 1-2 puffs into the lungs every 6 (six) hours as needed for Wheezing.  Dispense: 1 Inhaler; Refill: 11  -     Six Minute Walk Test to qualify for Home Oxygen; Future    Insomnia, unspecified type  -     doxepin (SINEQUAN) 10 MG capsule; Take 1 capsule (10 mg total) by mouth nightly as needed (insomnia).  Dispense: 30 capsule; Refill: 11    Paroxysmal atrial flutter  -     diltiaZEM (CARDIZEM CD) 120 MG Cp24; Take 1 capsule (120 mg total) by mouth once daily. Call PCP for refills  Dispense: 30 capsule; Refill: 11  -     carvedilol (COREG) 3.125 MG tablet; Take 1 tablet (3.125 mg total) by mouth 2 (two) times daily with meals.  Dispense: 60 tablet; Refill: 11           Follow-up in about 3 months (around 4/17/2018) for six min walk for oxygen.    Review of medical records from hospital. Medical records from hospital were reviewed during office visit - these included but were not limited to review of radiographic studies and /or radiologists reports, laboratory studies, discharge summaries, procedure notes, consultations and other transcribed notes.    MEDICAL DECISION MAKING: Moderate to high complexity.  Overall, the multiple problems listed are of moderate to high  severity that may impact quality of life and activities of daily living. Side effects of medications, treatment plan as well as options and alternatives reviewed and discussed with patient. There was counseling of patient concerning these issues.    Total time spent in face to face counseling and coordination of care - 60 minutes over 50% of time was used in discussion of prognosis, risks, benefits of treatment, instructions and compliance with regimen . Discussion with other physicians or health care providers (homehealth, durable medical equipment providers).

## 2018-01-17 NOTE — PATIENT INSTRUCTIONS
Albuterol inhalation solution  What is this medicine?  ALBUTEROL (al BYOO ter ole) is a bronchodilator. It helps to open up the airways in your lungs to make it easier to breathe. This medicine is used to treat and to prevent bronchospasm.  How should I use this medicine?  This medicine is used in a nebulizer. Nebulizers make a liquid into an aerosol that you breathe in through your mouth or your mouth and nose into your lungs. You will be taught how to use your nebulizer. Follow the directions on your prescription label. Take your medicine at regular intervals. Do not use more often than directed.  Talk to your pediatrician regarding the use of this medicine in children. Special care may be needed.  What side effects may I notice from receiving this medicine?  Side effects that you should report to your doctor or health care professional as soon as possible:  · allergic reactions like skin rash, itching or hives, swelling of the face, lips, or tongue  · breathing problems  · chest pain  · feeling faint or lightheaded, falls  · high blood pressure  · irregular heartbeat  · fever  · muscle cramps or weakness  · pain, tingling, numbness in the hands or feet  · vomiting  Side effects that usually do not require medical attention (report to your doctor or health care professional if they continue or are bothersome):  · cough  · difficulty sleeping  · headache  · nervousness, trembling  · stomach upset  · stuffy or runny nose  · throat irritation  · unusual taste  What may interact with this medicine?  · anti-infectives like chloroquine and pentamidine  · caffeine  · cisapride  · diuretics  · medicines for colds  · medicines for depression or emotional or psychotic conditions  · medicines for weight loss including some herbal products  · methadone  · some antibiotics like clarithromycin, erythromycin, levofloxacin, and linezolid  · some heart medicines  · steroid hormones like dexamethasone, cortisone,  hydrocortisone  · theophylline  · thyroid hormones  What if I miss a dose?  If you miss a dose, use it as soon as you can. If it is almost time for your next dose, use only that dose. Do not use double or extra doses.  Where should I keep my medicine?  Keep out of the reach of children.  Store between 2 and 25 degrees C (36 and 77 degrees F). Do not freeze. Protect from light. Throw away any unused medicine after the expiration date. Most products are kept in the foil package until time of use. Some products can be used up to 1 week after they are removed from the foil pouch. Check the instructions that come with your medicine.  What should I tell my health care provider before I take this medicine?  They need to know if you have any of the following conditions:  · diabetes  · heart disease or irregular heartbeat  · high blood pressure  · pheochromocytoma  · seizures  · thyroid disease  · an unusual or allergic reaction to albuterol, levalbuterol, sulfites, other medicines, foods, dyes, or preservatives  · pregnant or trying to get pregnant  · breast-feeding  What should I watch for while using this medicine?  Tell your doctor or health care professional if your symptoms do not improve. Do not use extra albuterol. Call your doctor right away if your asthma or bronchitis gets worse while you are using this medicine.  If your mouth gets dry try chewing sugarless gum or sucking hard candy. Drink water as directed.  NOTE:This sheet is a summary. It may not cover all possible information. If you have questions about this medicine, talk to your doctor, pharmacist, or health care provider. Copyright© 2017 [employer]        COPD: Using Inhalers  Some COPD medicines are taken by using inhalers. Inhalers deliver measured doses of medicine into your lungs. Not all inhalers work the same way. Have your healthcare provider show you how to use your inhaler.     Breathe out        Breathe in   Using metered-dose inhalers (MDIs)  with spacers  Metered-dose inhalers deliver medicine with a fine spray. You may also use a spacer (holding tube) with your inhaler. The spacer makes it easier for all of the medicine to get into your lungs.  1. Remove the caps from the inhaler and spacer. Shake the inhaler well and attach the spacer. Make sure to follow any special instructions if the inhaler is being used for the first time or has not been used for a while.  2. Breathe out normally. Put the spacer between your teeth and close your lips tightly around it. Keep your chin level.  3. Spray 1 puff into the spacer by pressing down on the inhaler. Then slowly breathe in as deeply as you can. This should take 3 to 5 seconds. If you breathe in too quickly, you may hear a whistling sound in the spacer.  4. Take the spacer out of your mouth. Hold your breath for a count of 10, if possible. Then slowly breathe out. If a second dose is prescribed, wait at least 30 seconds before taking the next puff.  Using MDIs without spacers  Inhalers work best with spacers. But if you dont use one, follow these steps:  1. Shake the inhaler and remove the cap. Breathe out through your mouth.  2. Put the inhaler mouthpiece in your mouth and close your lips tightly around it. Or if told to do so by your healthcare provider, hold the inhaler 1 to 2 inches from your mouth.  3. Keep your chin level. Spray 1 puff by pressing down on the inhaler while breathing in deeply through your mouth for about 5 seconds. Hold your breath for a count of 10. Then breathe out slowly. If a second dose is prescribed, wait at least 30 seconds before taking the next puff.  Using dry-powder inhalers (DPIs)      Some inhalers use tiny grains of powder to deliver medicine. These dont need spacers. They often have counters that track how many doses you use. Dry-powder inhalers dont all work the same way. Be sure you know how to use yours properly.  1. Load the prescribed dose of medicine by following  the instructions that come with the inhaler.  2. Breathe out normally, holding the inhaler away from your mouth. Hold your chin level.  3. Put the mouthpiece between your lips. Breathe in quickly and deeply through the inhaler--not through your nose. You may not feel or taste the medicine as you breathe in. This is normal.  4. Take the mouthpiece out of your mouth. Hold your breath for a count of 10, if possible.  5. Breathe out slowly--but not through the inhaler. Moisture from your breath can make the powder stick inside the inhaler. Also be sure to close the inhaler and store it in a dry place.  Date Last Reviewed: 10/1/2016  © 2160-9658 [employer]. [address] [place] [address]. All rights reserved. This information is not intended as a substitute for professional medical care. Always follow your healthcare professional's instructions.        Step-by-Step  Using an Inhaler Without a Spacer       Date Last Reviewed: 2/1/2017  © 6016-9229 [employer]. [address] [place] [address]. All rights reserved. This information is not intended as a substitute for professional medical care. Always follow your healthcare professional's instructions.

## 2018-01-17 NOTE — LETTER
January 17, 2018      Josué Faith MD  [address]  [place] [address]           [facility]  [address]  [place] [address]  Phone: [phone]  Fax: [phone]          Patient: Baltazar Garcia   MR Number: [id number]   YOB: 1940   Date of Visit: 1/17/2018       Dear No ref. provider found:    Thank you for referring Baltazar Wade to me for evaluation. Attached you will find relevant portions of my assessment and plan of care.    If you have questions, please do not hesitate to call me. I look forward to following Baltazar Wade along with you.    Sincerely,    Davon Iverson MD    Enclosure  CC:  No Recipients    IIf you would like to receive this communication electronically, please contact [email or web address] or [phone] to request [employer] Link access.    [employer] Link is a tool which provides read-only access to select patient information with whom you have a relationship. Its easy to use and provides real time access to review your patients record including encounter summaries, notes, results, and demographic information.    If you feel you have received this communication in error or would no longer like to receive these types of communications, please e-mail [email or web address]

## 2018-01-17 NOTE — PHYSICIAN QUERY
PT Name: Baltazar Garcia  MR #: [id number]     Physician Query Form - Documentation Clarification      CDS/: Lory Orantes RN CDI       Contact information: [phone]    This form is a permanent document in the medical record.     Query Date: January 17, 2018    By submitting this query, we are merely seeking further clarification of documentation. Please utilize your independent clinical judgment when addressing the question(s) below.    The Medical record reflects the following:    Supporting Clinical Findings Location in Medical Record   Diabetes II  - on metformin at home  - hold metformin now  - check Hb a1c, place on low dose SSI and POCG QAC/QHS    Glucose   1/3 - 100  1/4 - 180  1/5 - 172  Hem. A1C - 5.3  Est average glucose 105   H&P          Lab results   IV solumedrol 125 mg bolus on ER 1/3 2353  Methylprednisolone 80 mg IV every 8 hours   Medications                                                                            Doctor, Please specify diagnosis or diagnoses associated with above clinical findings.    Provider Use Only       [    ] Diabetes type II with hyperglycemia       [    ] Other, please specify_____________.                                                                                                             [x  ] Clinically undetermined

## 2018-01-17 NOTE — PHYSICIAN QUERY
PT Name: Baltazar Garcia  MR #: [id number]    Physician Query Form - Respiratory Condition Clarification      CDS/: Lory Orantes RN CDI    Contact information: [phone]    This form is a permanent document in the medical record.    Query Date: January 17, 2018    By submitting this query, we are merely seeking further clarification of documentation. Please utilize your independent clinical judgment when addressing the question(s) below.    The Medical record contains the following   Indicators   Supporting Clinical Findings Location in Medical Record   X   SOB, LANDA, Wheezing, Productive Cough, Use of Accessory Muscles, etc. Baltazar Garcia is a 78 y.o. male patient who has a hx of asthma presents to the Emergency Department for SOB which onset gradually tonight. Symptoms are intermittent and moderate in severity. ER MD Note   X   Acute/Chronic Illness Asthma exacerbation, non-allergic, moderate persistent  Acute bronchitis  COPD exacerbation   H&P   X   Radiology Findings Normal heart size.  Emphysematous changes suspected.  There is superior retraction of the right and left hilar regions.  Prominence of the right main pulmonary artery.  There is pleural-parenchymal thickening within the right upper lung and left pulmonary apex most marked on the right.  Interstitial coarsening within both lungs.  Left basilar opacity..  Small left pleural effusion versus scarring.   1/5      Respiratory Distress or Failure     X   Hypoxia or Hypercapnia Hypoxia ER MD Note   X   RR         ABGs         O2 sat R 16 - 23  89 - 100 %   ER vitals   X   BiPAP/Intubation The pt was admitted for Asthma exacerbation and Aflutter.He was initially admitted to ICU on Bipap.   Discharge summary   X   Supplemental O2 Bipap @ 35%  and Nasal cannula O2 1-5 L Orders   X   Home O2, Oxygen Dependence He qualified for home oxygen which was arranged.  Discharge summary   X   Treatment The pt was admitted for Asthma exacerbation and Aflutter.He was  initially admitted to ICU on Bipap. Patient was given IV steroids, abx, PO,  and breathing tx. Breathing improved and pt was transitioned from bipap to NC. He qualified for home oxygen which was arranged. He will be discharged on prednisone taper, Avelox, and a home nebulizer with neb txs. He will follow up with Pulmonology.    Discharge summary      Other       Provider, please specify diagnosis or diagnoses associated with above clinical findings.    [  ] Acute Respiratory Failure with Hypoxia  [  ] Acute Respiratory Failure with Hypercapnia  [  ] Acute Respiratory Failure with Hypoxia and Hypercapnia  [  ] Other Acute Respiratory Failure  [  ] Acute and (on) Chronic Respiratory Failure with Hypoxia  [  ] Acute and (on) Chronic Respiratory Failure with Hypercapnia  [  ] Acute and (on) Chronic Respiratory Failure with Hypoxia and Hypercapnia  [  ] Other Acute and (on) Chronic Respiratory Failure  [  ] Chronic Respiratory Failure with Hypoxia  [  ] Chronic Respiratory Failure with Hypercapnia  [  ] Chronic Respiratory Failure with Hypoxia and Hypercapnia  [  ] Other Chronic Respiratory Failure  [  ] Other Respiratory Diagnosis (please specify): _______________________________  [ x ] Clinically Undetermined    Please document in your progress notes daily for the duration of treatment until resolved and include in your discharge summary.

## 2018-01-17 NOTE — PHYSICIAN QUERY
"PT Name: Baltazar Garcia  MR #: [id number]     Physician Query Form - Documentation Clarification      CDS/: Lory Orantes RN CDI     Contact information: [phone]    This form is a permanent document in the medical record.     Query Date: January 17, 2018    By submitting this query, we are merely seeking further clarification of documentation. Please utilize your independent clinical judgment when addressing the question(s) below.    The Medical record reflects the following:    Supporting Clinical Findings Location in Medical Record     patient went into aflutter/afib when he was using the restroom.  EKG showed aflutter. Flutter rate>250.    He was also bolused with diltiazem and placed on diltiazem gtt.  After about 40 minutes, the patient converted to NSR.  He was able to be titrated off of diltiazem gtt.        H&P                                                                            Doctor, Please specify diagnosis or diagnoses associated with above clinical findings.    Doctor, please specify if "Atrial Fibrillation" was     Provider Use Only      [  x  ] Ruled in, specify type_________________________.      [    ] Ruled out.      [  x  ] Other, specify________unknown__________________.                                                                                                               [  ] Clinically undetermined            "

## 2018-01-30 ENCOUNTER — TELEPHONE (OUTPATIENT)
Dept: PULMONOLOGY | Facility: CLINIC | Age: 78
End: 2018-01-30

## 2018-01-30 NOTE — TELEPHONE ENCOUNTER
----- Message from Lindsay Acuna sent at 1/30/2018  9:19 AM CST -----  Contact: [facility] Ranjeet Nurse  States the pt may still have pneumonia and wants to be advised, can be reached at [phone]///[id number]

## 2018-02-02 DIAGNOSIS — J44.9 CHRONIC OBSTRUCTIVE PULMONARY DISEASE, UNSPECIFIED COPD TYPE: ICD-10-CM

## 2018-02-02 NOTE — TELEPHONE ENCOUNTER
----- Message from Talya Romero sent at 2/2/2018 11:49 AM CST -----  Contact: Brisa/daughter  States he is out of his breathing treatments and she would like to know if Dr Iverson can prescribe his levalbuterol for his neubilizer. Pt uses     [facility]/pharmacy #[id number] - [place] - [address] AT [facility]  [address] [address]  [place] [address]  Phone: [phone] Fax: [phone]    Please call Brisa at [phone]. Thank you

## 2018-02-06 RX ORDER — ALBUTEROL SULFATE 0.83 MG/ML
2.5 SOLUTION RESPIRATORY (INHALATION) EVERY 6 HOURS PRN
Qty: 360 ML | Refills: 11 | Status: SHIPPED | OUTPATIENT
Start: 2018-02-06 | End: 2018-04-17 | Stop reason: SDUPTHER

## 2018-04-17 ENCOUNTER — PROCEDURE VISIT (OUTPATIENT)
Dept: PULMONOLOGY | Facility: CLINIC | Age: 78
End: 2018-04-17
Payer: MEDICARE

## 2018-04-17 ENCOUNTER — OFFICE VISIT (OUTPATIENT)
Dept: PULMONOLOGY | Facility: CLINIC | Age: 78
End: 2018-04-17
Payer: MEDICARE

## 2018-04-17 VITALS
RESPIRATION RATE: 17 BRPM | WEIGHT: 115.06 LBS | DIASTOLIC BLOOD PRESSURE: 68 MMHG | OXYGEN SATURATION: 98 % | HEART RATE: 62 BPM | HEIGHT: 63 IN | BODY MASS INDEX: 20.39 KG/M2 | SYSTOLIC BLOOD PRESSURE: 128 MMHG | WEIGHT: 115.06 LBS | HEIGHT: 63 IN | BODY MASS INDEX: 20.39 KG/M2

## 2018-04-17 DIAGNOSIS — J44.9 CHRONIC OBSTRUCTIVE PULMONARY DISEASE, UNSPECIFIED COPD TYPE: ICD-10-CM

## 2018-04-17 DIAGNOSIS — J20.9 ACUTE BRONCHITIS, UNSPECIFIED ORGANISM: ICD-10-CM

## 2018-04-17 DIAGNOSIS — J15.1 PNEUMONIA DUE TO PSEUDOMONAS SPECIES, UNSPECIFIED LATERALITY, UNSPECIFIED PART OF LUNG: ICD-10-CM

## 2018-04-17 DIAGNOSIS — J45.41 ASTHMA EXACERBATION, NON-ALLERGIC, MODERATE PERSISTENT: ICD-10-CM

## 2018-04-17 DIAGNOSIS — I48.0 PAROXYSMAL ATRIAL FIBRILLATION: ICD-10-CM

## 2018-04-17 DIAGNOSIS — J47.9 BRONCHIECTASIS WITHOUT COMPLICATION: Primary | ICD-10-CM

## 2018-04-17 DIAGNOSIS — R13.10 DYSPHAGIA, UNSPECIFIED TYPE: ICD-10-CM

## 2018-04-17 PROBLEM — E87.70 VOLUME OVERLOAD: Status: ACTIVE | Noted: 2018-04-02

## 2018-04-17 PROBLEM — R53.1 WEAKNESS: Status: ACTIVE | Noted: 2018-04-02

## 2018-04-17 PROBLEM — T17.908A ASPIRATION INTO AIRWAY: Status: ACTIVE | Noted: 2018-04-02

## 2018-04-17 PROBLEM — E78.5 HYPERLIPIDEMIA: Status: ACTIVE | Noted: 2018-04-02

## 2018-04-17 PROBLEM — J45.909 ASTHMA: Status: ACTIVE | Noted: 2018-04-02

## 2018-04-17 PROBLEM — J96.21 ACUTE ON CHRONIC RESPIRATORY FAILURE WITH HYPOXIA: Status: ACTIVE | Noted: 2018-04-02

## 2018-04-17 PROBLEM — I48.91 ATRIAL FIBRILLATION: Status: ACTIVE | Noted: 2018-04-02

## 2018-04-17 PROCEDURE — 94618 PULMONARY STRESS TESTING: CPT | Mod: PBBFAC,PO

## 2018-04-17 PROCEDURE — 99215 OFFICE O/P EST HI 40 MIN: CPT | Mod: 25,S$PBB,ICN, | Performed by: INTERNAL MEDICINE

## 2018-04-17 PROCEDURE — 99999 PR PBB SHADOW E&M-EST. PATIENT-LVL III: CPT | Mod: PBBFAC,,, | Performed by: INTERNAL MEDICINE

## 2018-04-17 PROCEDURE — 94618 PULMONARY STRESS TESTING: CPT | Mod: 26,S$PBB,, | Performed by: INTERNAL MEDICINE

## 2018-04-17 PROCEDURE — 99213 OFFICE O/P EST LOW 20 MIN: CPT | Mod: PBBFAC,PO,25 | Performed by: INTERNAL MEDICINE

## 2018-04-17 RX ORDER — HYDRALAZINE HYDROCHLORIDE 10 MG/1
10 TABLET, FILM COATED ORAL 3 TIMES DAILY
COMMUNITY
End: 2019-05-07

## 2018-04-17 RX ORDER — LEVOFLOXACIN 750 MG/1
750 TABLET ORAL DAILY
COMMUNITY
End: 2018-04-17 | Stop reason: SDUPTHER

## 2018-04-17 RX ORDER — ALBUTEROL SULFATE 0.83 MG/ML
2.5 SOLUTION RESPIRATORY (INHALATION) EVERY 6 HOURS PRN
Qty: 360 ML | Refills: 11 | Status: SHIPPED | OUTPATIENT
Start: 2018-04-17 | End: 2018-08-07 | Stop reason: SDUPTHER

## 2018-04-17 RX ORDER — ISOSORBIDE DINITRATE 10 MG/1
10 TABLET ORAL 3 TIMES DAILY
COMMUNITY
End: 2019-05-07

## 2018-04-17 RX ORDER — LEVOFLOXACIN 750 MG/1
750 TABLET ORAL DAILY
Qty: 30 TABLET | Refills: 0 | Status: SHIPPED | OUTPATIENT
Start: 2018-04-17 | End: 2019-05-07

## 2018-04-17 RX ORDER — PREDNISONE 20 MG/1
20 TABLET ORAL DAILY
COMMUNITY
End: 2018-08-07 | Stop reason: SDUPTHER

## 2018-04-17 NOTE — PROGRESS NOTES
Subjective:       Patient ID: Baltazar Garcia is a 78 y.o. male.    Chief Complaint:   He   presents for evaluation and treatment of possible cerebral vascular accident with right leg weakness  after being discharged from the [facility]  10  days ago. Since discharge symptoms have gradually improving course since that time. Patient denies fever or chills. Symptoms are aggravated by activity. Symptoms improve withrest.  Respiratory: positive for sputum and wheezing; Cardiovascular: no chest pain or palpitations.  Patient currently is on oxygen at 2 L/min per nasal cannula..  MEDICAL RECORDS FROM THE HOSPITAL REVIEWED:  [facility] Hospital Medicine  Discharge Summary     Patient Name: Baltazar Garcia  MRN: [id number]  Admission Date: 1/3/2018  Hospital Length of Stay: 1 days  Discharge Date: 1/5/18  Attending Physician: Dr. Colindres   Discharging Provider: Mila Loya NP  Primary Care Provider: Josué Faith MD     HPI:   78M h/o asthma presents with cough and sob.  The patient reports cough started after coming home from recent travel. He presented to his PCP who started the patient on zpack.  Cough never completely resolved.     His sob acutely worsened today which alerted the patient's daughter to bring the patient to the hospital.  He uses albuterol inhaler twice a day and has not been increasing the usage of inhaler.   Denies sick contacts.  Does report recent travel out of town.  No other exacerbating or alleviating factors.    Pertinent ER course  Patient was found to by mildly tachycardic in ER with O2 sat 98% on NC .  He was given 3 albuterol treatments and improved respiratory status.  However, patient went into aflutter/afib when he was using the restroom.  EKG showed aflutter. Flutter rate>250.  O2 found to be 89% at that time.  He was placed on Bipap and given IV solumedrol 125mg bolus. He was also bolused with diltiazem and placed on diltiazem gtt.  After  about 40 minutes, the patient converted to NSR .  He was able to be titrated off of diltiazem gtt.    * No surgery found *       Hospital Course:   The pt was admitted for Asthma exacerbation and Aflutter.He was initially admitted to ICU on Bipap. Patient was given IV steroids, abx, PO,  and breathing tx. Breathing improved and pt was transitioned from bipap to NC. He qualified for home oxygen which was arranged. He will be discharged on prednisone taper, Avelox, and a home nebulizer with neb txs. He will follow up with Pulmonology.   Aflutter converted to NSR. Echo showed Normal LVF.  The pt was continued on Diltiazem and Coreg. CHADvasc 4. The pt was placed on Eliquis. He will follow up with Cardiology.   Pt reported constipation. Abd Xray showed ileus versus stool impaction. The pt was given enemas and placed on Miralax. Pt had large BMs. Repeat Abd Xray showed resolution of fecal impaction.  The pt was seen and examined today and determined to be stable for discharge.        Consults:   COPD    HPI  Past Medical History:   Diagnosis Date    Asthma     Diabetes mellitus     Hypertension     Tuberculosis 2002    Treated     No past surgical history on file.  Social History     Social History    Marital status:      Spouse name: N/A    Number of children: N/A    Years of education: N/A     Occupational History    Not on file.     Social History Main Topics    Smoking status: Former Smoker     Quit date: 1997    Smokeless tobacco: Never Used    Alcohol use No    Drug use: No    Sexual activity: No     Other Topics Concern    Not on file     Social History Narrative    No narrative on file     Review of Systems   Constitutional: Positive for fatigue. Negative for fever.   HENT: Positive for postnasal drip, rhinorrhea and congestion.    Eyes: Negative for redness and itching.   Respiratory: Positive for cough, sputum production, shortness of breath, dyspnea on extertion, use of rescue inhaler and  Paroxysmal Nocturnal Dyspnea.    Cardiovascular: Negative for chest pain, palpitations and leg swelling.   Genitourinary: Negative for difficulty urinating and hematuria.   Endocrine: Negative for cold intolerance and heat intolerance.    Skin: Negative for rash.   Gastrointestinal: Negative for nausea and abdominal pain.   Neurological: Negative for dizziness, syncope, weakness and light-headedness.   Hematological: Negative for adenopathy. Does not bruise/bleed easily.   Psychiatric/Behavioral: Negative for sleep disturbance. The patient is not nervous/anxious.        Objective:      Physical Exam   Constitutional: He is oriented to person, place, and time. He appears well-developed and well-nourished.   HENT:   Head: Normocephalic and atraumatic.   Mouth/Throat: Oropharyngeal exudate present.   Eyes: Conjunctivae are normal. Pupils are equal, round, and reactive to light.   Neck: Neck supple. No JVD present. No tracheal deviation present. No thyromegaly present.   Cardiovascular: Normal rate and normal heart sounds.  An irregularly irregular rhythm present.   No murmur heard.  Pulmonary/Chest: Effort normal. He has decreased breath sounds. He has wheezes in the right lower field and the left lower field. He has no rhonchi. He has no rales.   Abdominal: Soft. Bowel sounds are normal.   Musculoskeletal: Normal range of motion. He exhibits no edema or tenderness.   Lymphadenopathy:     He has no cervical adenopathy.   Neurological: He is alert and oriented to person, place, and time.   Skin: Skin is warm and dry.   Nursing note and vitals reviewed.    Personal Diagnostic Review  CT of chest performed on 4/2 /2018 with contrast, without contrast revealed bronciectesis - see below.    CT Chest without Contrast4/2/2018  [facility]  Result Impression     1.  Pulmonary hyperinflation, suggestive of underlying COPD, with extensive, bilateral pulmonary airspace disease, and with  evidence of probable old healed granulomatous disease in the chest. The primary pattern of the infiltrate is reticulonodular, suggestive of a possible atypical infiltrate.    2.  Tiny bilateral pleural effusions with some associated calcified pleural plaque on the right, raising the possibility of prior asbestos exposure.    3.  Mild aneurysmal dilatation of the ascending aorta.    4.  Fullness in the right hilum could be vascular in nature, although underlying adenopathy cannot be totally excluded on this nonintravenous contrast study.    5.  Probable dense bile versus sludge within the gallbladder.    6.  Prostatic hypertrophy, with no significant bladder outlet obstruction.    7.  Herniation of a small amount of fat along the inguinal canals, right side greater than left.   Result Narrative   CT CHEST WO CONTRAST, CT ABDOMEN PELVIS WO IV CONTRAST    CLINICAL HISTORY: shortness of breath     COMPARISON: None.    TECHNIQUE: Sequential axial CT images were obtained through the chest, abdomen, and pelvis without the aid of intravenous contrast and with the use of oral contrast. The lack of intravenous contrast limits evaluation of the regional vasculature and any vascular structures. Sagittal CT reconstruction images were obtained through the thoracic spine. Coronal CT reconstruction images were obtained through the entire abdomen and pelvis. Automated exposure control was used for dose reduction.    FINDINGS:    CT chest: Extensive airspace disease is seen throughout both lungs, having a predominant reticulonodular type appearance throughout both lungs. There is some additional pleural and/or parenchymal scarring at the right pulmonary apex posteriorly. There is also calcification posteriorly at the left pulmonary apex, possibly related to a large granuloma, measuring 2.0 x 0.8 cm. Some pleural calcification is seen in the right hemithorax, raising the possibility of prior asbestos exposure. There are small  "bilateral pleural effusions. There is no pneumothorax.    There is some fullness in the right hilum which could be vascular in nature although the possibility of adenopathy cannot be totally excluded on this nonintravenous contrast study. Otherwise, no definite mediastinal adenopathy is seen.    The heart is upper limits of normal in size. There is mild aneurysmal dilatation of the ascending aorta, measuring up to 3.7 cm in diameter, with moderate calcific atherosclerotic plaque thoracic aorta.    Visualized portions of the thyroid gland appear normal.    No acute bony pathology is seen in the thorax.    CT abdomen/pelvis: The left hepatic lobe measures 10 cm in span in the right hepatic lobe measures at least 12.5 cm in span. The spleen is normal in size, and the liver and spleen appear normal in density. There is some ill-defined increased density in the gallbladder which could be related to dense bile or to biliary sludge. No biliary ductal dilatation is noted. The pancreas, adrenal glands, and kidneys are unremarkable.    The urinary bladder is smooth in contour and there is no obvious bladder wall thickening despite an enlarged prostate gland, which measures 5.2 x 4.1 cm.    There is a moderate amount of stool in the colon. No abnormal bowel distention or bowel wall thickening is seen. There is herniation of a small amount fat along the right inguinal canal with herniation of a tiny amount of fat along the left inguinal canal. Small bowel loop is seen at the origin of the left inguinal canal but does not extend into the inguinal canal.    There is bilateral narrowing of the hips. Bridging bony spurring is present along the right sacroiliac joint.   Status Results Details       Hospital Encounter on 4/2/2018  [facility]")' href="[email or web address]/">Encounter Summary       Echo Complete4/2/2018  [facility]  Component Name Value Ref Range   IVS 1.00 0.6 - 1.1 cm   LVIDD 3.83 3.5 - 6.0 cm   LVIDS 2.60 2.1 - 4.0 cm   PW 0.90 0.6 - 1.1 cm   LV Peak Systolic Tissue Velocity at the Lateral MA 11.30 cm/s   LV Peak Diastolic Tissue Velocity During (A Sys Med) 12.20 cm/s   FS 32 28 - 44 %   Interventricular Septum/Left Ventricular PWD by 2D 1.12     MV Peak E Shakir 75.00 cm/s   PW 0.47 cm   LV mass 110.38 g   Ao root annulus 3.1 cm   TDI 6.6 cm/s   AV peak gradient 7 mmHg    TV rest pulmonary artery pressure 63 mmHg    E/E' ratio 11.36     Result Narrative   · Normal left ventricle cavity size. Normal wall thickness. Normal wall   motion. Normal (55-65%) ejection fraction  · Trivial pericardial effusion adjacent to the right ventricle. No pleural   effusion was visualized           (Gram stain included)4/2/2018  [facility]  Component Name Value Ref Range   Culture Sputum Moderate Normal Respiratory Elisha     Culture Sputum Moderate Pseudomonas aeruginosa (A)     Stain Many WBC/lpf     Stain 0-9 Epithelial cells per low power field     Stain Mixed Morphotoypes present     Specimen   Sputum     Organism Antibiotic Method Susceptibility   Pseudomonas aeruginosa Gentamicin   Susceptible    Tobramycin   Susceptible    Ceftazidime   Susceptible    Cefepime   Susceptible    Piperacillin + Tazobactam   Susceptible    Meropenem   Susceptible    Ciprofloxacin   Susceptible    Levofloxacin   Susceptible         .[email or web address]  No flowsheet data found.        Assessment:       1. Bronchiectasis without complication    2. Dysphagia, unspecified type    3. Chronic obstructive pulmonary disease, unspecified COPD type    4. Pneumonia due to Pseudomonas species, unspecified laterality, unspecified part of lung    5. Acute bronchitis, unspecified organism    6. Asthma exacerbation, non-allergic, moderate persistent    7. Paroxysmal atrial fibrillation        Outpatient Encounter Prescriptions as of  4/17/2018   Medication Sig Dispense Refill    albuterol (PROVENTIL) 2.5 mg /3 mL (0.083 %) nebulizer solution Take 3 mLs (2.5 mg total) by nebulization every 6 (six) hours as needed for Wheezing or Shortness of Breath. 360 mL 11    albuterol 90 mcg/actuation inhaler Inhale 1-2 puffs into the lungs every 6 (six) hours as needed for Wheezing. 1 Inhaler 11    apixaban 2.5 mg Tab Take 1 tablet (2.5 mg total) by mouth 2 (two) times daily. 60 tablet 5    diltiaZEM (CARDIZEM CD) 120 MG Cp24 Take 1 capsule (120 mg total) by mouth once daily. Call PCP for refills 30 capsule 11    doxepin (SINEQUAN) 10 MG capsule Take 1 capsule (10 mg total) by mouth nightly as needed (insomnia). 30 capsule 11    fluticasone (FLONASE) 50 mcg/actuation nasal spray       hydrALAZINE (APRESOLINE) 10 MG tablet Take 10 mg by mouth 3 (three) times daily.      hydrocodone-chlorpheniramine (TUSSIONEX) 10-8 mg/5 mL suspension Take 5 mLs by mouth every 12 (twelve) hours as needed for Cough.      isosorbide dinitrate (ISORDIL) 10 MG tablet Take 10 mg by mouth 3 (three) times daily.      levoFLOXacin (LEVAQUIN) 750 MG tablet Take 1 tablet (750 mg total) by mouth once daily. 30 tablet 0    lovastatin (MEVACOR) 10 MG tablet Take 10 mg by mouth every evening.      predniSONE (DELTASONE) 20 MG tablet Take 20 mg by mouth once daily.      [DISCONTINUED] albuterol (PROVENTIL) 2.5 mg /3 mL (0.083 %) nebulizer solution Take 3 mLs (2.5 mg total) by nebulization every 6 (six) hours as needed for Wheezing or Shortness of Breath. 360 mL 11    [DISCONTINUED] apixaban 2.5 mg Tab Take 1 tablet (2.5 mg total) by mouth 2 (two) times daily. 60 tablet 0    [DISCONTINUED] aspirin (ECOTRIN) 81 MG EC tablet Take 1 tablet (81 mg total) by mouth once daily.  0    [DISCONTINUED] levoFLOXacin (LEVAQUIN) 750 MG tablet Take 750 mg by mouth once daily.      carvedilol (COREG) 3.125 MG tablet Take 1 tablet (3.125 mg total) by mouth 2 (two) times daily with meals. 60  "tablet 11    finasteride (PROSCAR) 5 mg tablet Take 1 tablet (5 mg total) by mouth once daily. Call PCP for refills 30 tablet 0    folic acid-vit B6-vit B12 2.5-25-2 mg (FOLBIC OR EQUIV) 2.5-25-2 mg Tab Take 1 tablet by mouth once daily. 30 tablet 6    levocetirizine (XYZAL) 5 MG tablet Take 5 mg by mouth every evening.      losartan (COZAAR) 100 MG tablet       metformin (GLUCOPHAGE) 500 MG tablet Take 500 mg by mouth 2 (two) times daily with meals.      omeprazole (PRILOSEC) 20 MG capsule       pantoprazole (PROTONIX) 40 MG tablet Take 1 tablet (40 mg total) by mouth once daily. 30 tablet 0    polyethylene glycol (GLYCOLAX) 17 gram PwPk Take 17 g by mouth once daily. 30 packet 0    polyethylene glycol (GLYCOLAX) 17 gram/dose powder       tamsulosin (FLOMAX) 0.4 mg Cp24 Take 1 capsule (0.4 mg total) by mouth once daily. Call PCP for refills 30 capsule 0     No facility-administered encounter medications on file as of 4/17/2018.      Orders Placed This Encounter   Procedures    NEBULIZER KIT (SUPPLIES) FOR HOME USE     Order Specific Question:   Height:     Answer:   5' 2.5" (1.588 m)     Order Specific Question:   Weight:     Answer:   52.2 kg (115 lb 1.3 oz)     Order Specific Question:   Length of need (1-99 months):     Answer:   99     Order Specific Question:   Mask or Mouthpiece?     Answer:   Mouthpiece     Order Specific Question:   Vendor:     Answer:   Other (use comments)     Order Specific Question:   Expected Date of Delivery:     Answer:   4/19/2018     Comments:   RIOS    X-Ray Chest PA And Lateral     Standing Status:   Future     Standing Expiration Date:   4/17/2019     Order Specific Question:   May the Radiologist modify the order per protocol to meet the clinical needs of the patient?     Answer:   Yes    Ambulatory referral to Speech Therapy     Referral Priority:   Routine     Referral Type:   Speech Therapy     Referral Reason:   Specialty Services Required     Requested " Specialty:   Speech Pathology     Number of Visits Requested:   1    SLP video swallow     Standing Status:   Future     Standing Expiration Date:   4/18/2019     Scheduling Instructions:      Perform with radiology     Plan:       Requested Prescriptions     Signed Prescriptions Disp Refills    levoFLOXacin (LEVAQUIN) 750 MG tablet 30 tablet 0     Sig: Take 1 tablet (750 mg total) by mouth once daily.    folic acid-vit B6-vit B12 2.5-25-2 mg (FOLBIC OR EQUIV) 2.5-25-2 mg Tab 30 tablet 6     Sig: Take 1 tablet by mouth once daily.    albuterol (PROVENTIL) 2.5 mg /3 mL (0.083 %) nebulizer solution 360 mL 11     Sig: Take 3 mLs (2.5 mg total) by nebulization every 6 (six) hours as needed for Wheezing or Shortness of Breath.    apixaban 2.5 mg Tab 60 tablet 5     Sig: Take 1 tablet (2.5 mg total) by mouth 2 (two) times daily.     Bronchiectasis without complication  -     X-Ray Chest PA And Lateral; Future; Expected date: 04/17/2018  -     NEBULIZER KIT (SUPPLIES) FOR HOME USE    Dysphagia, unspecified type  -     Ambulatory referral to Speech Therapy  -     SLP video swallow; Future; Expected date: 04/17/2018    Chronic obstructive pulmonary disease, unspecified COPD type  -     albuterol (PROVENTIL) 2.5 mg /3 mL (0.083 %) nebulizer solution; Take 3 mLs (2.5 mg total) by nebulization every 6 (six) hours as needed for Wheezing or Shortness of Breath.  Dispense: 360 mL; Refill: 11  -     NEBULIZER KIT (SUPPLIES) FOR HOME USE    Pneumonia due to Pseudomonas species, unspecified laterality, unspecified part of lung  -     levoFLOXacin (LEVAQUIN) 750 MG tablet; Take 1 tablet (750 mg total) by mouth once daily.  Dispense: 30 tablet; Refill: 0  -     folic acid-vit B6-vit B12 2.5-25-2 mg (FOLBIC OR EQUIV) 2.5-25-2 mg Tab; Take 1 tablet by mouth once daily.  Dispense: 30 tablet; Refill: 6    Acute bronchitis, unspecified organism    Asthma exacerbation, non-allergic, moderate persistent    Paroxysmal atrial  fibrillation  -     apixaban 2.5 mg Tab; Take 1 tablet (2.5 mg total) by mouth 2 (two) times daily.  Dispense: 60 tablet; Refill: 5           Follow-up in about 3 months (around 7/17/2018) for cxr.    Review of medical records from hospital. Medical records from hospital were reviewed during office visit - these included but were not limited to review of radiographic studies and /or radiologists reports, laboratory studies, discharge summaries, procedure notes, consultations and other transcribed notes.    MEDICAL DECISION MAKING: Moderate to high complexity.  Overall, the multiple problems listed are of moderate to high severity that may impact quality of life and activities of daily living. Side effects of medications, treatment plan as well as options and alternatives reviewed and discussed with patient. There was counseling of patient concerning these issues.    Total time spent in face to face counseling and coordination of care - 40 minutes over 50% of time was used in discussion of prognosis, risks, benefits of treatment, instructions and compliance with regimen . Discussion with other physicians or health care providers (homehealth, durable medical equipment providers).

## 2018-04-17 NOTE — PATIENT INSTRUCTIONS
Levofloxacin tablets  What is this medicine?  LEVOFLOXACIN (rakan mccall JANELL anisha sin) is a quinolone antibiotic. It is used to treat certain kinds of bacterial infections. It will not work for colds, flu, or other viral infections.  How should I use this medicine?  Take this medicine by mouth with a full glass of water. Follow the directions on the prescription label. This medicine can be taken with or without food. Take your medicine at regular intervals. Do not take your medicine more often than directed. Do not skip doses or stop your medicine early even if you feel better. Do not stop taking except on your doctor's advice.  A special MedGuide will be given to you by the pharmacist with each prescription and refill. Be sure to read this information carefully each time.  Talk to your pediatrician regarding the use of this medicine in children. While this drug may be prescribed for children as young as 6 months for selected conditions, precautions do apply.  What side effects may I notice from receiving this medicine?  Side effects that you should report to your doctor or health care professional as soon as possible:  · allergic reactions like skin rash or hives, swelling of the face, lips, or tongue  · anxious  · confusion  · depressed mood  · diarrhea  · fast, irregular heartbeat  · hallucination, loss of contact with reality  · joint, muscle, or tendon pain or swelling  · pain, tingling, numbness in the hands or feet  · suicidal thoughts or other mood changes  · sunburn  · unusually weak or tired  Side effects that usually do not require medical attention (report to your doctor or health care professional if they continue or are bothersome):  · dry mouth  · headache  · nausea  · trouble sleeping  What may interact with this medicine?  Do not take this medicine with any of the following medications:  · arsenic trioxide  · chloroquine  · droperidol  · medicines for irregular heart rhythm like amiodarone, disopyramide,  dofetilide, flecainide, quinidine, procainamide, sotalol  · some medicines for depression or mental problems like phenothiazines, pimozide, and ziprasidone  This medicine may also interact with the following medications:  · amoxapine  · antacids  · birth control pills  · cisapride  · dairy products  · didanosine (ddI) buffered tablets or powder  · haloperidol  · multivitamins  · NSAIDS, medicines for pain and inflammation, like ibuprofen or naproxen  · retinoid products like tretinoin or isotretinoin  · risperidone  · some other antibiotics like clarithromycin or erythromycin  · sucralfate  · theophylline  · warfarin  What if I miss a dose?  If you miss a dose, take it as soon as you remember. If it is almost time for your next dose, take only that dose. Do not take double or extra doses.  Where should I keep my medicine?  Keep out of the reach of children.  Store at room temperature between 15 and 30 degrees C (59 and 86 degrees F). Keep in a tightly closed container. Throw away any unused medicine after the expiration date.  What should I tell my health care provider before I take this medicine?  They need to know if you have any of these conditions:  · bone problems  · cerebral disease  · history of low levels of potassium in the blood  · irregular heartbeat  · joint problems  · kidney disease  · myasthenia gravis  · seizures  · tendon problems  · tingling of the fingers or toes, or other nerve disorder  · an unusual or allergic reaction to levofloxacin, other quinolone antibiotics, foods, dyes, or preservatives  · pregnant or trying to get pregnant  · breast-feeding  What should I watch for while using this medicine?  Tell your doctor or health care professional if your symptoms do not improve or if they get worse. Drink several glasses of water a day and cut down on drinks that contain caffeine. You must not get dehydrated while taking this medicine.  You may get drowsy or dizzy. Do not drive, use machinery, or  do anything that needs mental alertness until you know how this medicine affects you. Do not sit or stand up quickly, especially if you are an older patient. This reduces the risk of dizzy or fainting spells.  This medicine can make you more sensitive to the sun. Keep out of the sun. If you cannot avoid being in the sun, wear protective clothing and use a sunscreen. Do not use sun lamps or tanning beds/booths. Contact your doctor if you get a sunburn.  If you are a diabetic monitor your blood glucose carefully. If you get an unusual reading stop taking this medicine and call your doctor right away.  Do not treat diarrhea with over-the-counter products. Contact your doctor if you have diarrhea that lasts more than 2 days or if the diarrhea is severe and watery.  Avoid antacids, calcium, iron, and zinc products for 2 hours before and 2 hours after taking a dose of this medicine.  NOTE:This sheet is a summary. It may not cover all possible information. If you have questions about this medicine, talk to your doctor, pharmacist, or health care provider. Copyright© 2017 [employer]        Albuterol inhalation aerosol  What is this medicine?  ALBUTEROL (al BYOO ter ole) is a bronchodilator. It helps open up the airways in your lungs to make it easier to breathe. This medicine is used to treat and to prevent bronchospasm.  How should I use this medicine?  This medicine is for inhalation through the mouth. Follow the directions on your prescription label. Take your medicine at regular intervals. Do not use more often than directed. Make sure that you are using your inhaler correctly. Ask you doctor or health care provider if you have any questions.  Talk to your pediatrician regarding the use of this medicine in children. Special care may be needed.  What side effects may I notice from receiving this medicine?  Side effects that you should report to your doctor or health care professional as soon as possible:  · allergic  reactions like skin rash, itching or hives, swelling of the face, lips, or tongue  · breathing problems  · chest pain  · feeling faint or lightheaded, falls  · high blood pressure  · irregular heartbeat  · fever  · muscle cramps or weakness  · pain, tingling, numbness in the hands or feet  · vomiting  Side effects that usually do not require medical attention (report to your doctor or health care professional if they continue or are bothersome):  · cough  · difficulty sleeping  · headache  · nervousness or trembling  · stomach upset  · stuffy or runny nose  · throat irritation  · unusual taste  What may interact with this medicine?  · anti-infectives like chloroquine and pentamidine  · caffeine  · cisapride  · diuretics  · medicines for colds  · medicines for depression or for emotional or psychotic conditions  · medicines for weight loss including some herbal products  · methadone  · some antibiotics like clarithromycin, erythromycin, levofloxacin, and linezolid  · some heart medicines  · steroid hormones like dexamethasone, cortisone, hydrocortisone  · theophylline  · thyroid hormones  What if I miss a dose?  If you miss a dose, use it as soon as you can. If it is almost time for your next dose, use only that dose. Do not use double or extra doses.  Where should I keep my medicine?  Keep out of the reach of children.  Store at room temperature between 15 and 30 degrees C (59 and 86 degrees F). The contents are under pressure and may burst when exposed to heat or flame. Do not freeze. This medicine does not work as well if it is too cold. Throw away any unused medicine after the expiration date. Inhalers need to be thrown away after the labeled number of puffs have been used or by the expiration date; whichever comes first. Ventolin HFA should be thrown away 12 months after removing from foil pouch. Check the instructions that come with your medicine.  What should I tell my health care provider before I take this  medicine?  They need to know if you have any of the following conditions:  · diabetes  · heart disease or irregular heartbeat  · high blood pressure  · pheochromocytoma  · seizures  · thyroid disease  · an unusual or allergic reaction to albuterol, levalbuterol, sulfites, other medicines, foods, dyes, or preservatives  · pregnant or trying to get pregnant  · breast-feeding  What should I watch for while using this medicine?  Tell your doctor or health care professional if your symptoms do not improve. Do not use extra albuterol. If your asthma or bronchitis gets worse while you are using this medicine, call your doctor right away.  If your mouth gets dry try chewing sugarless gum or sucking hard candy. Drink water as directed.  NOTE:This sheet is a summary. It may not cover all possible information. If you have questions about this medicine, talk to your doctor, pharmacist, or health care provider. Copyright© 2017 [employer]

## 2018-04-17 NOTE — PROGRESS NOTES
"[facility]  Six Minute Walk     SUMMARY     Ordering Provider: Dr Iverson   Interpreting Provider: Dr Iverson  Performing nurse/tech/RT: ELZA Robledo RRT  Diagnosis: COPD  Height: 5' 2.5" (158.8 cm)  Weight: 52.2 kg (115 lb 1.3 oz)  BMI (Calculated): 20.8   Patient Race:             Phase Oxygen Assessment Supplemental O2 Heart   Rate Blood Pressure Rui Dyspnea Scale Rating   Resting 96 % Room Air 60 bpm 128/68 4   Exercise        Minute        1 95 % Room Air 75 bpm     2 92 % Room Air 75 bpm     3 91 % Room Air 76 bpm     4 91 % Room Air 77 bpm     5 89 % Room Air 77 bpm     6  89 % Room Air 89 bpm 133/54 7-8   Recovery        Minute        1 93 % Room Air 69 bpm     2 97 % Room Air 66 bpm     3 97 % Room Air 65 bpm     4 98 % Room Air 63 bpm 108/55 5-6     Six Minute Walk Summary  6MWT Status: completed without stopping  Patient Reported:  (tingling in legs and SOB,tightness in chest)     Interpretation:  Did the patient stop or pause?: No                                         Total Time Walked (Calculated): 360 seconds  Final Partial Lap Distance (feet): 0 feet  Total Distance Meters (Calculated): 304.8 meters  Predicted Distance Meters (Calculated): 419.72 meters  Percentage of Predicted (Calculated): 72.62  Peak VO2 (Calculated): 13.12  Mets: 3.75  Has The Patient Had a Previous Six Minute Walk Test?: No       Previous 6MWT Results  Has The Patient Had a Previous Six Minute Walk Test?: No       Interpretation:  Total distance walked in six minutes is mildly reduced indicating a reduction in overall  functional capacity. There was  moderate exercise induced hypoxemia with significant oxygen desaturation to 89%    Oxygen desaturation did not meet criteria for supplemental oxygen prescription.    Clinical correlation suggested.    Davon Iverson MD    Mild exercise-induced hypoxemia described as an arterial oxygen saturation of 93-95% (or 3-4% less than at rest), moderate exercise-induced " hypoxemia as 89-93%, and severe exercise induced hypoxemia as < 89% O2 saturation.  Medicare Criteria Comments:   When arterial oxygen saturation is at or below 88% during exercise (severe exercise induced hypoxemia) then the patient falls under Medicare Group 1 criteria for supplemental oxygen.  Details about Medicare Group Criteria coverage can be found at http://www.cms.[facility].gov/manuals/downloads/

## 2018-05-07 ENCOUNTER — TELEPHONE (OUTPATIENT)
Dept: PULMONOLOGY | Facility: CLINIC | Age: 78
End: 2018-05-07

## 2018-05-07 NOTE — TELEPHONE ENCOUNTER
----- Message from Jamila Arias sent at 5/7/2018 12:28 PM CDT -----  Contact: [phone]  Would like to consult with nurse regarding refill for Albuterol, please call back at [phone]. Thanks/ar

## 2018-05-07 NOTE — TELEPHONE ENCOUNTER
Pharmacy contacted and instructed to bill nebulizer solution under Medicare Part B. Rep verbalized understanding.

## 2018-08-06 ENCOUNTER — HOSPITAL ENCOUNTER (OUTPATIENT)
Dept: RADIOLOGY | Facility: HOSPITAL | Age: 78
Discharge: HOME | End: 2018-08-06
Attending: INTERNAL MEDICINE
Payer: MEDICARE

## 2018-08-06 DIAGNOSIS — J47.9 BRONCHIECTASIS WITHOUT COMPLICATION: ICD-10-CM

## 2018-08-06 PROCEDURE — 71046 X-RAY EXAM CHEST 2 VIEWS: CPT | Mod: TC

## 2018-08-06 PROCEDURE — 71046 X-RAY EXAM CHEST 2 VIEWS: CPT | Mod: 26,,, | Performed by: RADIOLOGY

## 2018-08-07 ENCOUNTER — OFFICE VISIT (OUTPATIENT)
Dept: PULMONOLOGY | Facility: CLINIC | Age: 78
End: 2018-08-07
Payer: MEDICARE

## 2018-08-07 VITALS
DIASTOLIC BLOOD PRESSURE: 70 MMHG | HEIGHT: 63 IN | WEIGHT: 115.31 LBS | OXYGEN SATURATION: 97 % | RESPIRATION RATE: 16 BRPM | BODY MASS INDEX: 20.43 KG/M2 | HEART RATE: 60 BPM | SYSTOLIC BLOOD PRESSURE: 118 MMHG

## 2018-08-07 DIAGNOSIS — J44.9 CHRONIC OBSTRUCTIVE PULMONARY DISEASE, UNSPECIFIED COPD TYPE: ICD-10-CM

## 2018-08-07 DIAGNOSIS — J47.9 BRONCHIECTASIS WITHOUT COMPLICATION: Primary | ICD-10-CM

## 2018-08-07 DIAGNOSIS — J45.909 ASTHMA, UNSPECIFIED ASTHMA SEVERITY, UNSPECIFIED WHETHER COMPLICATED, UNSPECIFIED WHETHER PERSISTENT: ICD-10-CM

## 2018-08-07 DIAGNOSIS — J96.21 ACUTE ON CHRONIC RESPIRATORY FAILURE WITH HYPOXIA: ICD-10-CM

## 2018-08-07 DIAGNOSIS — A31.0 MAIC (MYCOBACTERIUM AVIUM-INTRACELLULARE COMPLEX): ICD-10-CM

## 2018-08-07 PROCEDURE — 99215 OFFICE O/P EST HI 40 MIN: CPT | Mod: 25,S$PBB,, | Performed by: INTERNAL MEDICINE

## 2018-08-07 PROCEDURE — 99999 PR PBB SHADOW E&M-EST. PATIENT-LVL V: CPT | Mod: PBBFAC,,, | Performed by: INTERNAL MEDICINE

## 2018-08-07 PROCEDURE — 99215 OFFICE O/P EST HI 40 MIN: CPT | Mod: PBBFAC,PO | Performed by: INTERNAL MEDICINE

## 2018-08-07 RX ORDER — ALBUTEROL SULFATE 0.83 MG/ML
2.5 SOLUTION RESPIRATORY (INHALATION) EVERY 6 HOURS PRN
Qty: 360 ML | Refills: 11 | Status: SHIPPED | OUTPATIENT
Start: 2018-08-07 | End: 2019-04-17 | Stop reason: SDUPTHER

## 2018-08-07 RX ORDER — FLUTICASONE PROPIONATE AND SALMETEROL 250; 50 UG/1; UG/1
1 POWDER RESPIRATORY (INHALATION) 2 TIMES DAILY
Qty: 60 EACH | Refills: 11 | Status: SHIPPED | OUTPATIENT
Start: 2018-08-07 | End: 2019-05-07

## 2018-08-07 RX ORDER — AZITHROMYCIN 250 MG/1
250 TABLET, FILM COATED ORAL DAILY
Qty: 6 TABLET | Refills: 1 | Status: SHIPPED | OUTPATIENT
Start: 2018-08-07 | End: 2019-05-07

## 2018-08-07 RX ORDER — PREDNISONE 20 MG/1
TABLET ORAL
Qty: 20 TABLET | Refills: 0 | Status: SHIPPED | OUTPATIENT
Start: 2018-08-07 | End: 2019-05-07

## 2018-08-07 RX ORDER — ALBUTEROL SULFATE 90 UG/1
1-2 AEROSOL, METERED RESPIRATORY (INHALATION) EVERY 6 HOURS PRN
Qty: 1 INHALER | Refills: 11 | Status: SHIPPED | OUTPATIENT
Start: 2018-08-07 | End: 2019-05-07

## 2018-08-07 NOTE — PATIENT INSTRUCTIONS
Azithromycin tablets  What is this medicine?  AZITHROMYCIN (az ith goyo MYE sin) is a macrolide antibiotic. It is used to treat or prevent certain kinds of bacterial infections. It will not work for colds, flu, or other viral infections.  How should I use this medicine?  Take this medicine by mouth with a full glass of water. Follow the directions on the prescription label. The tablets can be taken with food or on an empty stomach. If the medicine upsets your stomach, take it with food. Take your medicine at regular intervals. Do not take your medicine more often than directed. Take all of your medicine as directed even if you think your are better. Do not skip doses or stop your medicine early. Talk to your pediatrician regarding the use of this medicine in children. Special care may be needed.  What side effects may I notice from receiving this medicine?  Side effects that you should report to your doctor or health care professional as soon as possible:  · allergic reactions like skin rash, itching or hives, swelling of the face, lips, or tongue  · confusion, nightmares or hallucinations  · dark urine  · difficulty breathing  · hearing loss  · irregular heartbeat or chest pain  · pain or difficulty passing urine  · redness, blistering, peeling or loosening of the skin, including inside the mouth  · white patches or sores in the mouth  · yellowing of the eyes or skin  Side effects that usually do not require medical attention (report to your doctor or health care professional if they continue or are bothersome):  · diarrhea  · dizziness, drowsiness  · headache  · stomach upset or vomiting  · tooth discoloration  · vaginal irritation  What may interact with this medicine?  Do not take this medicine with any of the following medications:  · lincomycin  This medicine may also interact with the following medications:  · amiodarone  · antacids  · birth control  pills  · cyclosporine  · digoxin  · magnesium  · nelfinavir  · phenytoin  · warfarin  What if I miss a dose?  If you miss a dose, take it as soon as you can. If it is almost time for your next dose, take only that dose. Do not take double or extra doses.  Where should I keep my medicine?  Keep out of the reach of children.  Store at room temperature between 15 and 30 degrees C (59 and 86 degrees F). Throw away any unused medicine after the expiration date.  What should I tell my health care provider before I take this medicine?  They need to know if you have any of these conditions:  · kidney disease  · liver disease  · irregular heartbeat or heart disease  · an unusual or allergic reaction to azithromycin, erythromycin, other macrolide antibiotics, foods, dyes, or preservatives  · pregnant or trying to get pregnant  · breast-feeding  What should I watch for while using this medicine?  Tell your doctor or health care professional if your symptoms do not improve.  Do not treat diarrhea with over the counter products. Contact your doctor if you have diarrhea that lasts more than 2 days or if it is severe and watery.  This medicine can make you more sensitive to the sun. Keep out of the sun. If you cannot avoid being in the sun, wear protective clothing and use sunscreen. Do not use sun lamps or tanning beds/booths.  NOTE:This sheet is a summary. It may not cover all possible information. If you have questions about this medicine, talk to your doctor, pharmacist, or health care provider. Copyright© 2017 [employer]        Prednisone tablets  What is this medicine?  PREDNISONE (PRED ni sone) is a corticosteroid. It is commonly used to treat inflammation of the skin, joints, lungs, and other organs. Common conditions treated include asthma, allergies, and arthritis. It is also used for other conditions, such as blood disorders and diseases of the adrenal glands.  How should I use this medicine?  Take this medicine by  mouth with a glass of water. Follow the directions on the prescription label. Take this medicine with food. If you are taking this medicine once a day, take it in the morning. Do not take more medicine than you are told to take. Do not suddenly stop taking your medicine because you may develop a severe reaction. Your doctor will tell you how much medicine to take. If your doctor wants you to stop the medicine, the dose may be slowly lowered over time to avoid any side effects.  Talk to your pediatrician regarding the use of this medicine in children. Special care may be needed.  What side effects may I notice from receiving this medicine?  Side effects that you should report to your doctor or health care professional as soon as possible:  · allergic reactions like skin rash, itching or hives, swelling of the face, lips, or tongue  · changes in emotions or moods  · changes in vision  · depressed mood  · eye pain  · fever or chills, cough, sore throat, pain or difficulty passing urine  · increased thirst  · swelling of ankles, feet  Side effects that usually do not require medical attention (report to your doctor or health care professional if they continue or are bothersome):  · confusion, excitement, restlessness  · headache  · nausea, vomiting  · skin problems, acne, thin and shiny skin  · trouble sleeping  · weight gain  What may interact with this medicine?  Do not take this medicine with any of the following medications:  · metyrapone  · mifepristone  This medicine may also interact with the following medications:  · aminoglutethimide  · amphotericin B  · aspirin and aspirin-like medicines  · barbiturates  · certain medicines for diabetes, like glipizide or glyburide  · cholestyramine  · cholinesterase inhibitors  · cyclosporine  · digoxin  · diuretics  · ephedrine  · female hormones, like estrogens and birth control pills  · isoniazid  · ketoconazole  · NSAIDS, medicines for pain and inflammation, like  ibuprofen or naproxen  · phenytoin  · rifampin  · toxoids  · vaccines  · warfarin  What if I miss a dose?  If you miss a dose, take it as soon as you can. If it is almost time for your next dose, talk to your doctor or health care professional. You may need to miss a dose or take an extra dose. Do not take double or extra doses without advice.  Where should I keep my medicine?  Keep out of the reach of children.  Store at room temperature between 15 and 30 degrees C (59 and 86 degrees F). Protect from light. Keep container tightly closed. Throw away any unused medicine after the expiration date.  What should I tell my health care provider before I take this medicine?  They need to know if you have any of these conditions:  · Cushing's syndrome  · diabetes  · glaucoma  · heart disease  · high blood pressure  · infection (especially a virus infection such as chickenpox, cold sores, or herpes)  · kidney disease  · liver disease  · mental illness  · myasthenia gravis  · osteoporosis  · seizures  · stomach or intestine problems  · thyroid disease  · an unusual or allergic reaction to lactose, prednisone, other medicines, foods, dyes, or preservatives  · pregnant or trying to get pregnant  · breast-feeding  What should I watch for while using this medicine?  Visit your doctor or health care professional for regular checks on your progress. If you are taking this medicine over a prolonged period, carry an identification card with your name and address, the type and dose of your medicine, and your doctor's name and address.  This medicine may increase your risk of getting an infection. Tell your doctor or health care professional if you are around anyone with measles or chickenpox, or if you develop sores or blisters that do not heal properly.  If you are going to have surgery, tell your doctor or health care professional that you have taken this medicine within the last twelve months.  Ask your doctor or health care  professional about your diet. You may need to lower the amount of salt you eat.  This medicine may affect blood sugar levels. If you have diabetes, check with your doctor or health care professional before you change your diet or the dose of your diabetic medicine.  NOTE:This sheet is a summary. It may not cover all possible information. If you have questions about this medicine, talk to your doctor, pharmacist, or health care provider. Copyright© 2017 [employer]        Fluticasone; Salmeterol inhalation powder  What is this medicine?  FLUTICASONE; SALMETEROL (floo TIK a sone; sal ME te role) inhalation is a combination of two medicines that decrease inflammation and help to open up the airways of your lungs. It is used to treat COPD. This medicine is also used to treat asthma. Do NOT use for an acute asthma attack. Do NOT use for a COPD attack.  How should I use this medicine?  This medicine is inhaled through the mouth. Rinse your mouth with water after use. Make sure not to swallow the water. Follow the directions on the prescription label. Do not use a spacer device with this inhaler. Take your medicine at regular intervals. Do not take your medicine more often than directed. Do not stop taking except on your doctor's advice. Make sure that you are using your inhaler correctly. Ask you doctor or health care provider if you have any questions.  A special MedGuide will be given to you by the pharmacist with each prescription and refill. Be sure to read this information carefully each time.  Talk to your pediatrician regarding the use of this medicine in children. Special care may be needed.  What side effects may I notice from receiving this medicine?  Side effects that you should report to your doctor or health care professional as soon as possible:  · allergic reactions like skin rash or hives, swelling of the face, lips, or tongue  · chest pain  · dizziness or lightheaded  · fever or chills  · irregular  heartbeat  · vision problems  Side effects that usually do not require medical attention (report to your doctor or health care professional if they continue or are bothersome):  · coughing, hoarseness, throat irritation  · headache  · nervousness  · stomach problems  · stuffy nose  · tremor  What may interact with this medicine?  Do not take this medicine with any of the following medications:  · MAOIs like Carbex, Eldepryl, Marplan, Nardil, and Parnate  This medicine may also interact with the following medications:  · aminophylline or theophylline  · antiviral medicines for HIV or AIDS  · diuretics  · medicines for colds  · medicines for depression or emotional conditions  · medicines for fungal infections like ketoconazole and itraconazole  · medicines for the heart like metoprolol, propanolol  · medicines for weight loss including some herbal products  · other medicine for breathing problems  · pimozide  · some antibiotics like clarithromycin, erythromycin, levofloxacin, linezolid, and telithromycin  · vaccines  What if I miss a dose?  If you miss a dose, use it as soon as you remember. If it is almost time for your next dose, use only that dose and continue with your regular schedule, spacing doses evenly. Do not use double or extra doses.  Where should I keep my medicine?  Keep out of the reach of children.  Store at room temperature between 68 and 77 degrees F (20 and 25 degrees C). Do not leave your medicine in the heat or sun. Throw away 1 month after you open the package or whenever the dose indicator reads 0, whichever comes first. Throw away unopened packages after the expiration date.  What should I tell my health care provider before I take this medicine?  They need to know if you have any of these conditions:  · bone problems  · immune system problems  · diabetes  · heart disease or irregular heartbeat  · high blood pressure  · infection  · pheochromocytoma  · seizures  · thyroid disease  · worsening  asthma  · an unusual or allergic reaction to fluticasone, salmeterol, other corticosteroids, other medicines, foods, dyes, or preservatives  · pregnant or trying to get pregnant  · breast-feeding  What should I watch for while using this medicine?  Visit your doctor for regular check ups. Tell your doctor or health care professional if your symptoms do not get better. If your symptoms get worse or if you need your short-acting inhalers more often, call your doctor right away. Do not use this medicine more than every 12 hours.  If you have asthma, be aware that using this medicine may increase your risk of dying from asthma- related problems. Talk to your doctor about the risks and benefits of taking this medicine. NEVER use this medicine for an acute asthma attack.  If you are going to have surgery tell your doctor or health care professional that you are using this medicine. Try not to come in contact with people with the chicken pox or measles. If you do, call your doctor.  NOTE:This sheet is a summary. It may not cover all possible information. If you have questions about this medicine, talk to your doctor, pharmacist, or health care provider. Copyright© 2017 [employer]

## 2018-08-07 NOTE — PROGRESS NOTES
Subjective:       Patient ID: Baltazar Garcia is a 78 y.o. male.    Chief Complaint: COPD    HPI COPD  He presents for evaluation and treatment of COPD. The patient is currently having symptoms / an exacerbation. Current symptoms include acute dyspnea, chronic dyspnea, cough productive of white and yellow sputum in small amounts and wheezing. Symptoms have been present since several weeks ago and have been gradually worsening. He denies chills and fever. Associated symptoms include poor exercise tolerance and shortness of breath.  This episode appears to have been triggered by no identifiable factor. Treatments tried for the current exacerbation: albuterol nebulizer. The patient has been having similar episodes for approximately 2 years. He uses 1 pillows at night. Patient currently is on oxygen at 2 L/min per nasal cannula.. The patient is having no constitutional symptoms, denying fever, chills, anorexia, or weight loss. The patient has been hospitalized for this condition before. He quit smoking approximately many ( 30 ?) years ago. The patient is experiencing exercise intolerance (difficulty walking 3 blocks on flat ground and difficulty climbing 2 flights of stairs).  Past Medical History:   Diagnosis Date    Asthma     Diabetes mellitus     Hypertension     Tuberculosis 2002    Treated     History reviewed. No pertinent surgical history.  Social History     Social History    Marital status:      Spouse name: N/A    Number of children: N/A    Years of education: N/A     Occupational History    Not on file.     Social History Main Topics    Smoking status: Former Smoker     Quit date: 1997    Smokeless tobacco: Never Used    Alcohol use No    Drug use: No    Sexual activity: No     Other Topics Concern    Not on file     Social History Narrative    No narrative on file     Review of Systems   Constitutional: Positive for fatigue. Negative for fever.   HENT: Positive for postnasal drip,  rhinorrhea and congestion.    Eyes: Negative for redness and itching.   Respiratory: Positive for cough, sputum production, shortness of breath, dyspnea on extertion, use of rescue inhaler and Paroxysmal Nocturnal Dyspnea.    Cardiovascular: Negative for chest pain, palpitations and leg swelling.   Genitourinary: Negative for difficulty urinating and hematuria.   Endocrine: Negative for cold intolerance and heat intolerance.    Skin: Negative for rash.   Gastrointestinal: Negative for nausea and abdominal pain.   Neurological: Negative for dizziness, syncope, weakness and light-headedness.   Hematological: Negative for adenopathy. Does not bruise/bleed easily.   Psychiatric/Behavioral: Negative for sleep disturbance. The patient is not nervous/anxious.        Objective:      Physical Exam   Constitutional: He is oriented to person, place, and time. He appears well-developed and well-nourished.   HENT:   Head: Normocephalic and atraumatic.   Mouth/Throat: Oropharyngeal exudate present.   Eyes: Conjunctivae are normal. Pupils are equal, round, and reactive to light.   Neck: Neck supple. No JVD present. No tracheal deviation present. No thyromegaly present.   Cardiovascular: Normal rate, regular rhythm and normal heart sounds.    No murmur heard.  Pulmonary/Chest: Effort normal. He has decreased breath sounds. He has wheezes in the right lower field and the left lower field. He has no rhonchi. He has no rales.   Abdominal: Soft. Bowel sounds are normal.   Musculoskeletal: Normal range of motion. He exhibits no edema or tenderness.   Lymphadenopathy:     He has no cervical adenopathy.   Neurological: He is alert and oriented to person, place, and time.   Skin: Skin is warm and dry.   Nursing note and vitals reviewed.    Personal Diagnostic Review  Chest X-Ray: I personally reviewed the films and findings are:, air trapping/emphysema, right upper lobe scarring  Pulmonary function tests:  No recetn  Pulmonary Studies  Review 8/7/2018 4/17/2018 4/17/2018 1/17/2018 1/5/2018 1/5/2018 1/5/2018   SpO2 97 98 - 98 98 100 99   Ordering Provider - - Dr Iverson - - - -   Interpreting Provider - - Dr Iverson - - - -   Performing nurse/tech/RT - - ELZA Robledo RRT - - - -   Diagnosis - - COPD - - - -   Height 62.500 62.500 62.5 63.000 - - -   Weight 1844.81 1841.28 1841.28 1823.65 - - -   BMI (Calculated) 20.8 20.8 20.8 20.2 - - -   Predicted Distance 342.87 342.87 342.87 346.24 - - -   Patient Race - -  - - - -   6MWT Status - - completed without stopping - - - -   Patient Reported - - (No Data) - - - -   Was O2 used? - - No - - - -   Did patient stop? - - No - - - -   Type of assistive device(s) used? - - no assistive devices - - - -   Is extra documentation required for this patient? - - Yes - - - -   Oxygen Saturation - - 96 - - - -   Supplemental Oxygen - - Room Air - - - -   Heart Rate - - 60 - - - -   Blood Pressure - - 128/68 - - - -   Rui Dyspnea Rating  - - somewhat heavy - - - -   Oxygen Saturation - - 89 - - - -   Supplemental Oxygen - - Room Air - - - -   Heart Rate - - 89 - - - -   Blood Pressure - - 133/54 - - - -   Rui Dyspnea Rating  - - very heavy - - - -   Recovery Time (seconds) - - 240 - - - -   Oxygen Saturation - - 98 - - - -   Supplemental Oxygen - - Room Air - - - -   Heart Rate - - 63 - - - -   Blood Pressure - - 108/55 - - - -   Rui Dyspnea Rating  - - heavy - - - -   Is procedure ready for interpretation? - - Yes - - - -   Did the patient stop or pause? - - No - - - -   Total Time Walked (Calculated) - - 360 - - - -   Total Laps Walked - - 5 - - - -   Final Partial Lap Distance (feet) - - 0 - - - -   Total Distance Feet (Calculated) - - 1000 - - - -   Total Distance Meters (Calculated) - - 304.8 - - - -   Predicted Distance Meters (Calculated) 419.55 419.72 419.72 429.69 - - -   Percentage of Predicted (Calculated) - - 72.62 - - - -   Peak VO2 (Calculated) - - 13.12 - - - -   Mets - - 3.75 - - - -   Has The Patient  Had a Previous Six Minute Walk Test? - - No - - - -   Oxygen Qualification? - - No - - - -     No flowsheet data found.      Assessment:       1. Bronchiectasis without complication    2. Acute on chronic respiratory failure with hypoxia    3. Asthma, unspecified asthma severity, unspecified whether complicated, unspecified whether persistent    4. MAIC (mycobacterium avium-intracellulare complex)    5. Chronic obstructive pulmonary disease, unspecified COPD type        Outpatient Encounter Prescriptions as of 8/7/2018   Medication Sig Dispense Refill    albuterol (PROVENTIL) 2.5 mg /3 mL (0.083 %) nebulizer solution Take 3 mLs (2.5 mg total) by nebulization every 6 (six) hours as needed for Wheezing or Shortness of Breath. 360 mL 11    albuterol 90 mcg/actuation inhaler Inhale 1-2 puffs into the lungs every 6 (six) hours as needed for Wheezing. 1 Inhaler 11    apixaban 2.5 mg Tab Take 1 tablet (2.5 mg total) by mouth 2 (two) times daily. 60 tablet 5    carvedilol (COREG) 3.125 MG tablet Take 1 tablet (3.125 mg total) by mouth 2 (two) times daily with meals. 60 tablet 11    diltiaZEM (CARDIZEM CD) 120 MG Cp24 Take 1 capsule (120 mg total) by mouth once daily. Call PCP for refills 30 capsule 11    doxepin (SINEQUAN) 10 MG capsule Take 1 capsule (10 mg total) by mouth nightly as needed (insomnia). 30 capsule 11    finasteride (PROSCAR) 5 mg tablet Take 1 tablet (5 mg total) by mouth once daily. Call PCP for refills 30 tablet 0    fluticasone (FLONASE) 50 mcg/actuation nasal spray       folic acid-vit B6-vit B12 2.5-25-2 mg (FOLBIC OR EQUIV) 2.5-25-2 mg Tab Take 1 tablet by mouth once daily. 30 tablet 6    hydrALAZINE (APRESOLINE) 10 MG tablet Take 10 mg by mouth 3 (three) times daily.      levocetirizine (XYZAL) 5 MG tablet Take 5 mg by mouth every evening.      polyethylene glycol (GLYCOLAX) 17 gram/dose powder       [DISCONTINUED] albuterol (PROVENTIL) 2.5 mg /3 mL (0.083 %) nebulizer solution Take 3  mLs (2.5 mg total) by nebulization every 6 (six) hours as needed for Wheezing or Shortness of Breath. 360 mL 11    [DISCONTINUED] albuterol 90 mcg/actuation inhaler Inhale 1-2 puffs into the lungs every 6 (six) hours as needed for Wheezing. 1 Inhaler 11    azithromycin (ZITHROMAX Z-XIOMY) 250 MG tablet Take 1 tablet (250 mg total) by mouth once daily. 500 mg on day 1 (two tablets) followed by 250 mg once daily on days 2-5 6 tablet 1    fluticasone-salmeterol 250-50 mcg/dose (ADVAIR DISKUS) 250-50 mcg/dose diskus inhaler Inhale 1 puff into the lungs 2 (two) times daily. Controller 60 each 11    hydrocodone-chlorpheniramine (TUSSIONEX) 10-8 mg/5 mL suspension Take 5 mLs by mouth every 12 (twelve) hours as needed for Cough.      isosorbide dinitrate (ISORDIL) 10 MG tablet Take 10 mg by mouth 3 (three) times daily.      levoFLOXacin (LEVAQUIN) 750 MG tablet Take 1 tablet (750 mg total) by mouth once daily. 30 tablet 0    losartan (COZAAR) 100 MG tablet       lovastatin (MEVACOR) 10 MG tablet Take 10 mg by mouth every evening.      metformin (GLUCOPHAGE) 500 MG tablet Take 500 mg by mouth 2 (two) times daily with meals.      omeprazole (PRILOSEC) 20 MG capsule       pantoprazole (PROTONIX) 40 MG tablet Take 1 tablet (40 mg total) by mouth once daily. 30 tablet 0    polyethylene glycol (GLYCOLAX) 17 gram PwPk Take 17 g by mouth once daily. 30 packet 0    predniSONE (DELTASONE) 20 MG tablet Prednisone 60 mg/ day for 3 days, 40 mg/day for 3 days,20 mg/ day for 3 days, 10 mg a day for 3 days. 20 tablet 0    tamsulosin (FLOMAX) 0.4 mg Cp24 Take 1 capsule (0.4 mg total) by mouth once daily. Call PCP for refills 30 capsule 0    [DISCONTINUED] predniSONE (DELTASONE) 20 MG tablet Take 20 mg by mouth once daily.       No facility-administered encounter medications on file as of 8/7/2018.      Orders Placed This Encounter   Procedures    X-Ray Chest PA And Lateral     Standing Status:   Future     Standing Expiration  Date:   8/7/2019     Order Specific Question:   May the Radiologist modify the order per protocol to meet the clinical needs of the patient?     Answer:   Yes    Six Minute Walk Test to qualify for Home Oxygen     Standing Status:   Future     Standing Expiration Date:   8/7/2019     Plan:       Requested Prescriptions     Signed Prescriptions Disp Refills    fluticasone-salmeterol 250-50 mcg/dose (ADVAIR DISKUS) 250-50 mcg/dose diskus inhaler 60 each 11     Sig: Inhale 1 puff into the lungs 2 (two) times daily. Controller    albuterol (PROVENTIL) 2.5 mg /3 mL (0.083 %) nebulizer solution 360 mL 11     Sig: Take 3 mLs (2.5 mg total) by nebulization every 6 (six) hours as needed for Wheezing or Shortness of Breath.    albuterol 90 mcg/actuation inhaler 1 Inhaler 11     Sig: Inhale 1-2 puffs into the lungs every 6 (six) hours as needed for Wheezing.    predniSONE (DELTASONE) 20 MG tablet 20 tablet 0     Sig: Prednisone 60 mg/ day for 3 days, 40 mg/day for 3 days,20 mg/ day for 3 days, 10 mg a day for 3 days.    azithromycin (ZITHROMAX Z-XIOMY) 250 MG tablet 6 tablet 1     Sig: Take 1 tablet (250 mg total) by mouth once daily. 500 mg on day 1 (two tablets) followed by 250 mg once daily on days 2-5     Bronchiectasis without complication  -     predniSONE (DELTASONE) 20 MG tablet; Prednisone 60 mg/ day for 3 days, 40 mg/day for 3 days,20 mg/ day for 3 days, 10 mg a day for 3 days.  Dispense: 20 tablet; Refill: 0  -     azithromycin (ZITHROMAX Z-XIOMY) 250 MG tablet; Take 1 tablet (250 mg total) by mouth once daily. 500 mg on day 1 (two tablets) followed by 250 mg once daily on days 2-5  Dispense: 6 tablet; Refill: 1    Acute on chronic respiratory failure with hypoxia    Asthma, unspecified asthma severity, unspecified whether complicated, unspecified whether persistent  -     fluticasone-salmeterol 250-50 mcg/dose (ADVAIR DISKUS) 250-50 mcg/dose diskus inhaler; Inhale 1 puff into the lungs 2 (two) times daily.  Controller  Dispense: 60 each; Refill: 11    MAIC (mycobacterium avium-intracellulare complex)    Chronic obstructive pulmonary disease, unspecified COPD type  -     albuterol (PROVENTIL) 2.5 mg /3 mL (0.083 %) nebulizer solution; Take 3 mLs (2.5 mg total) by nebulization every 6 (six) hours as needed for Wheezing or Shortness of Breath.  Dispense: 360 mL; Refill: 11  -     albuterol 90 mcg/actuation inhaler; Inhale 1-2 puffs into the lungs every 6 (six) hours as needed for Wheezing.  Dispense: 1 Inhaler; Refill: 11  -     X-Ray Chest PA And Lateral; Future; Expected date: 08/07/2018  -     Six Minute Walk Test to qualify for Home Oxygen; Future      Follow-up in about 6 months (around 2/7/2019) for Review CXR, 6 min walk.    MEDICAL DECISION MAKING: Moderate to high complexity.  Overall, the multiple problems listed are of moderate to high severity that may impact quality of life and activities of daily living. Side effects of medications, treatment plan as well as options and alternatives reviewed and discussed with patient. There was counseling of patient concerning these issues.    Total time spent in face to face counseling and coordination of care - 40 minutes over 50% of time was used in discussion of prognosis, risks, benefits of treatment, instructions and compliance with regimen . Discussion with other physicians or health care providers (homehealth, durable medical equipment providers).

## 2018-10-10 DIAGNOSIS — J15.1 PNEUMONIA DUE TO PSEUDOMONAS SPECIES, UNSPECIFIED LATERALITY, UNSPECIFIED PART OF LUNG: ICD-10-CM

## 2018-10-15 RX ORDER — FOLIC ACID-PYRIDOXINE-CYANOCOBALAMIN TAB 2.5-25-2 MG 2.5-25-2 MG
TAB ORAL
Qty: 30 TABLET | Refills: 2 | OUTPATIENT
Start: 2018-10-15

## 2018-11-20 DIAGNOSIS — I48.92 PAROXYSMAL ATRIAL FLUTTER: ICD-10-CM

## 2018-11-21 RX ORDER — DILTIAZEM HYDROCHLORIDE 120 MG/1
120 CAPSULE, COATED, EXTENDED RELEASE ORAL DAILY
Qty: 30 CAPSULE | Refills: 11 | Status: SHIPPED | OUTPATIENT
Start: 2018-11-21 | End: 2019-05-07

## 2019-01-28 ENCOUNTER — OFFICE VISIT (OUTPATIENT)
Dept: PULMONOLOGY | Facility: CLINIC | Age: 79
End: 2019-01-28
Attending: FAMILY MEDICINE
Payer: MEDICARE

## 2019-01-28 ENCOUNTER — HOSPITAL ENCOUNTER (OUTPATIENT)
Dept: RADIOLOGY | Facility: HOSPITAL | Age: 79
Discharge: HOME | End: 2019-01-28
Attending: INTERNAL MEDICINE
Payer: MEDICARE

## 2019-01-28 VITALS
OXYGEN SATURATION: 97 % | HEART RATE: 87 BPM | BODY MASS INDEX: 22.63 KG/M2 | WEIGHT: 123 LBS | SYSTOLIC BLOOD PRESSURE: 132 MMHG | WEIGHT: 123 LBS | HEIGHT: 62 IN | RESPIRATION RATE: 16 BRPM | HEIGHT: 62 IN | BODY MASS INDEX: 22.63 KG/M2 | DIASTOLIC BLOOD PRESSURE: 79 MMHG

## 2019-01-28 DIAGNOSIS — J44.9 CHRONIC OBSTRUCTIVE PULMONARY DISEASE, UNSPECIFIED COPD TYPE: ICD-10-CM

## 2019-01-28 DIAGNOSIS — J41.0 SIMPLE CHRONIC BRONCHITIS: Primary | ICD-10-CM

## 2019-01-28 DIAGNOSIS — R09.02 EXERCISE HYPOXEMIA: ICD-10-CM

## 2019-01-28 PROCEDURE — 99215 OFFICE O/P EST HI 40 MIN: CPT | Mod: 25,S$PBB,, | Performed by: INTERNAL MEDICINE

## 2019-01-28 PROCEDURE — 71046 XR CHEST PA AND LATERAL: ICD-10-PCS | Mod: 26,,, | Performed by: RADIOLOGY

## 2019-01-28 PROCEDURE — 71046 X-RAY EXAM CHEST 2 VIEWS: CPT | Mod: 26,,, | Performed by: RADIOLOGY

## 2019-01-28 PROCEDURE — 94618 PULMONARY STRESS TESTING: CPT | Mod: PBBFAC,PN

## 2019-01-28 PROCEDURE — 99999 PR PBB SHADOW E&M-EST. PATIENT-LVL V: CPT | Mod: PBBFAC,,, | Performed by: INTERNAL MEDICINE

## 2019-01-28 PROCEDURE — 71046 X-RAY EXAM CHEST 2 VIEWS: CPT | Mod: TC

## 2019-01-28 PROCEDURE — 99999 PR PBB SHADOW E&M-EST. PATIENT-LVL V: ICD-10-PCS | Mod: PBBFAC,,, | Performed by: INTERNAL MEDICINE

## 2019-01-28 PROCEDURE — 94618 PULMONARY STRESS TESTING: ICD-10-PCS | Mod: 26,S$PBB,, | Performed by: INTERNAL MEDICINE

## 2019-01-28 PROCEDURE — 94618 PULMONARY STRESS TESTING: CPT | Mod: 26,S$PBB,, | Performed by: INTERNAL MEDICINE

## 2019-01-28 PROCEDURE — 99215 PR OFFICE/OUTPT VISIT, EST, LEVL V, 40-54 MIN: ICD-10-PCS | Mod: 25,S$PBB,, | Performed by: INTERNAL MEDICINE

## 2019-01-28 PROCEDURE — 99215 OFFICE O/P EST HI 40 MIN: CPT | Mod: PBBFAC,25,PN | Performed by: INTERNAL MEDICINE

## 2019-01-28 RX ORDER — ASPIRIN 81 MG/1
81 TABLET ORAL
COMMUNITY

## 2019-01-28 RX ORDER — HYDROCODONE POLISTIREX AND CHLORPHENIRAMINE POLISTIREX 10; 8 MG/5ML; MG/5ML
5 SUSPENSION, EXTENDED RELEASE ORAL
COMMUNITY
End: 2019-05-07

## 2019-01-28 RX ORDER — NAPROXEN 500 MG/1
TABLET ORAL
Refills: 3 | COMMUNITY
Start: 2018-11-19 | End: 2019-05-07

## 2019-01-28 RX ORDER — DOXEPIN HYDROCHLORIDE 10 MG/1
10 CAPSULE ORAL
COMMUNITY

## 2019-01-28 RX ORDER — PHENAZOPYRIDINE HYDROCHLORIDE 100 MG/1
100 TABLET, FILM COATED ORAL
COMMUNITY
End: 2019-05-07

## 2019-01-28 RX ORDER — OMEPRAZOLE 20 MG/1
20 CAPSULE, DELAYED RELEASE ORAL
COMMUNITY
End: 2019-05-07

## 2019-01-28 RX ORDER — CARVEDILOL 3.12 MG/1
6.25 TABLET ORAL
COMMUNITY
End: 2019-05-07

## 2019-01-28 RX ORDER — GUAIFENESIN 600 MG/1
1200 TABLET, EXTENDED RELEASE ORAL 2 TIMES DAILY
Qty: 60 TABLET | Refills: 11 | Status: SHIPPED | OUTPATIENT
Start: 2019-01-28 | End: 2019-02-27

## 2019-01-28 RX ORDER — ATORVASTATIN CALCIUM 40 MG/1
40 TABLET, FILM COATED ORAL NIGHTLY
Refills: 11 | COMMUNITY
Start: 2019-01-22 | End: 2020-01-27 | Stop reason: SDUPTHER

## 2019-01-28 NOTE — PROCEDURES
"[facility]  Six Minute Walk     SUMMARY     Ordering Provider: Dr Iverson   Interpreting Provider: Dr Iverson  Performing nurse/tech/RT: Ashley  Diagnosis: COPD  Height: 5' 2" (157.5 cm)  Weight: 55.8 kg (123 lb)  BMI (Calculated): 22.5   Patient Race:             Phase Oxygen Assessment Supplemental O2 Heart   Rate Blood Pressure Rui Dyspnea Scale Rating   Resting 97 % Room Air 85 bpm 132/79 5-6   Exercise        Minute        1 94 % Room Air 103 bpm     2 95 % Room Air 19 bpm     3 93 % Room Air 109 bpm     4 92 % Room Air 108 bpm     5 91 % Room Air 108 bpm     6  92 % Room Air 110 bpm 135/66 7-8   Recovery        Minute        1 92 % Room Air 103 bpm     2 92 % Room Air 97 bpm     3 97 % Room Air 80 bpm     4 99 % Room Air 79 bpm 124/67 5-6     Six Minute Walk Summary  6MWT Status: completed without stopping  Patient Reported: No complaints     Interpretation:  Did the patient stop or pause?: No            Total Time Walked (Calculated): 360 seconds  Final Partial Lap Distance (feet): 0 feet  Total Distance Meters (Calculated): 304.8 meters  Predicted Distance Meters (Calculated): 398.54 meters  Percentage of Predicted (Calculated): 76.48  Peak VO2 (Calculated): 13.12  Mets: 3.75  Has The Patient Had a Previous Six Minute Walk Test?: No       Previous 6MWT Results  Has The Patient Had a Previous Six Minute Walk Test?: No      Interpretation:  Total distance walked in six minutes is mildly reduced indicating a reduction in overall  functional capacity. There was mild exercise induced hypoxemia to 91% with significant oxygen desaturation.    Oxygen desaturation did not meet criteria for supplemental oxygen prescription.    Clinical correlation suggested.    Davon Iverson MD    Mild exercise-induced hypoxemia described as an arterial oxygen saturation of 93-95% (or 3-4% less than at rest), moderate exercise-induced hypoxemia as 89-93%, and severe exercise induced hypoxemia as < 89% O2 " saturation.  Medicare Criteria Comments:   When arterial oxygen saturation is at or below 88% during exercise (severe exercise induced hypoxemia) then the patient falls under Medicare Group 1 criteria for supplemental oxygen.  Details about Medicare Group Criteria coverage can be found at http://www.cms.hhs.gov/manuals/downloads/

## 2019-01-28 NOTE — PROGRESS NOTES
Subjective:       Patient ID: Baltazar Garcia is a 79 y.o. male.    Chief Complaint:   He   presents for evaluation and treatment of stroke and Chronic Obstructive Pulmonary Disease  after being discharged from the hospital  6  days ago. Since discharge symptoms have gradually improving course since that time. Patient denies fever or chills. Symptoms are aggravated by activty. Symptoms improve with rest.  Respiratory: positive for cough, dyspnea on exertion and sputum; Cardiovascular: no chest pain or palpitations.  Patient currently is on oxygen at 2 L/min per nasal cannula..  MEDICAL RECORDS FROM THE HOSPITAL REVIEWED:    [facility]    Interface, Rad Results In - 01/21/2019  4:47 PM CST  EXAMINATION: Brain MRI Noncontrast    CLINICAL HISTORY: ,   stroke       FINDINGS:  Trace diffusion imaging shows acute CVA right basal ganglia.    Ventricles are normal and there is no mass.    The pituitary fossa and optic chiasm are normal.   Susceptibility imaging is normal with no evidence of hemorrhage products.     There is motion on this study.  There is a right posterior scalp metallic artifact from a tiny piece of metal in the right scalp posteriorly.    IMPRESSION:  1. Acute CVA right basal ganglia.    COPD    Answers for HPI/ROS submitted by the patient on 1/28/2019   Asthma  In the past 4 weeks, how much of the time did your asthma keep you from getting as much done at work, school, or at home?: a little of the time  During the past 4 weeks, how often have you had shortness of breath?: more than once a day  During the past 4 weeks, how often did your asthma symptoms (Wheezing, coughing, shortness of breath, chest tightness or pain) wake you up at night or earlier that usual in the morning?: 4 or more nights a week  During the past 4 weeks, how often have you used your rescue inhaler or nebulizer medication (such as albuterol)?: 3 or more times per day  How would you rate your asthma  control during the past 4 weeks?: somewhat controlled   : 10      Chronic Hypoxic Respiratory Failure:  The patient has been prescribed oxygen for chronic respiratory condition . He has severe lung disease and hypoxia related symptoms. Other treatments including inhaled bronchodilators, nebulizer treatments have been tried. Symptoms are improved with supplemental oxygen.  Patient is mobile within the home and benefits from prescribed oxygen.  Patient has documented nocturnal hypoxemia and signs and symptoms of nocturnal hypoxia which include but are not limited to fatigue, daytime hypersomnolence , non restorative sleep and edema.    HPI  Past Medical History:   Diagnosis Date    Asthma     Diabetes mellitus     Hypertension     Tuberculosis 2002    Treated     History reviewed. No pertinent surgical history.  Social History     Socioeconomic History    Marital status:      Spouse name: Not on file    Number of children: Not on file    Years of education: Not on file    Highest education level: Not on file   Social Needs    Financial resource strain: Not on file    Food insecurity - worry: Not on file    Food insecurity - inability: Not on file    Transportation needs - medical: Not on file    Transportation needs - non-medical: Not on file   Occupational History    Not on file   Tobacco Use    Smoking status: Former Smoker     Last attempt to quit:      Years since quittin.0    Smokeless tobacco: Never Used   Substance and Sexual Activity    Alcohol use: No    Drug use: No    Sexual activity: No   Other Topics Concern    Not on file   Social History Narrative    Not on file     Review of Systems   Constitutional: Positive for fatigue. Negative for fever.   HENT: Positive for postnasal drip, rhinorrhea and congestion.    Eyes: Negative for redness and itching.   Respiratory: Positive for cough, sputum production, shortness of breath, dyspnea on extertion, use of rescue inhaler  and Paroxysmal Nocturnal Dyspnea.    Cardiovascular: Negative for chest pain, palpitations and leg swelling.   Genitourinary: Negative for difficulty urinating and hematuria.   Endocrine: Negative for cold intolerance and heat intolerance.    Skin: Negative for rash.   Gastrointestinal: Negative for nausea and abdominal pain.   Neurological: Negative for dizziness, syncope, weakness and light-headedness.   Hematological: Negative for adenopathy. Does not bruise/bleed easily.   Psychiatric/Behavioral: Negative for sleep disturbance. The patient is not nervous/anxious.        Objective:      Physical Exam   Constitutional: He is oriented to person, place, and time. He appears well-developed and well-nourished.   HENT:   Head: Normocephalic and atraumatic.   Mouth/Throat: Oropharyngeal exudate present.   Eyes: Conjunctivae are normal. Pupils are equal, round, and reactive to light.   Neck: Neck supple. No JVD present. No tracheal deviation present. No thyromegaly present.   Cardiovascular: Normal rate, regular rhythm and normal heart sounds.   No murmur heard.  Pulmonary/Chest: Effort normal. He has decreased breath sounds. He has wheezes in the right lower field and the left lower field. He has no rhonchi. He has no rales.   Abdominal: Soft. Bowel sounds are normal.   Musculoskeletal: Normal range of motion. He exhibits no edema or tenderness.   Lymphadenopathy:     He has no cervical adenopathy.   Neurological: He is alert and oriented to person, place, and time.   Skin: Skin is warm and dry.   Nursing note and vitals reviewed.    Personal Diagnostic Review  Chest x-ray: stable upper lobe fibrosis    .[email or web address]  No flowsheet data found.    Interface, Rad Results In - 01/20/2019 10:06 PM CST  XR CHEST 1 VIEW, 1/20/2019 9:00 PM    COMPARISON: 4/2/2018    HISTORY: Weakness    FINDINGS:  Single frontal view of the chest was performed. Scarring and volume loss in the right upper lobe, similar to the prior exam. Diffuse  emphysema. Blunted left costophrenic angle which may due to scarring. The cardiomediastinal silhouette is within normal limits. No pneumothorax.    IMPRESSION:  Emphysema with scarring in the right lung apex and left costophrenic angle.    Assessment:       1. Simple chronic bronchitis    2. Chronic obstructive pulmonary disease, unspecified COPD type    3. Exercise hypoxemia        Outpatient Encounter Medications as of 1/28/2019   Medication Sig Dispense Refill    albuterol (PROVENTIL) 2.5 mg /3 mL (0.083 %) nebulizer solution Take 3 mLs (2.5 mg total) by nebulization every 6 (six) hours as needed for Wheezing or Shortness of Breath. 360 mL 11    albuterol 90 mcg/actuation inhaler Inhale 1-2 puffs into the lungs every 6 (six) hours as needed for Wheezing. 1 Inhaler 11    apixaban 2.5 mg Tab Take 1 tablet (2.5 mg total) by mouth 2 (two) times daily. 60 tablet 5    aspirin (ECOTRIN) 81 MG EC tablet Take 81 mg by mouth.      atorvastatin (LIPITOR) 40 MG tablet Take 40 mg by mouth nightly.  11    carvedilol (COREG) 3.125 MG tablet Take 3.125 mg by mouth.      doxepin (SINEQUAN) 10 MG capsule Take 10 mg by mouth.      fluticasone-salmeterol 250-50 mcg/dose (ADVAIR DISKUS) 250-50 mcg/dose diskus inhaler Inhale 1 puff into the lungs 2 (two) times daily. Controller 60 each 11    folic acid-vit B6-vit B12 2.5-25-2 mg (FOLBIC OR EQUIV) 2.5-25-2 mg Tab Take 1 tablet by mouth once daily. 30 tablet 6    hydrocodone-chlorpheniramine (TUSSIONEX) 10-8 mg/5 mL suspension Take 5 mLs by mouth every 12 (twelve) hours as needed for Cough.      azithromycin (ZITHROMAX Z-XIOMY) 250 MG tablet Take 1 tablet (250 mg total) by mouth once daily. 500 mg on day 1 (two tablets) followed by 250 mg once daily on days 2-5 6 tablet 1    carvedilol (COREG) 3.125 MG tablet Take 1 tablet (3.125 mg total) by mouth 2 (two) times daily with meals. 60 tablet 11    diltiaZEM (CARDIZEM CD) 120 MG Cp24 TAKE 1 CAPSULE (120 MG TOTAL) BY MOUTH ONCE  DAILY. CALL PCP FOR REFILLS 30 capsule 11    doxepin (SINEQUAN) 10 MG capsule Take 1 capsule (10 mg total) by mouth nightly as needed (insomnia). 30 capsule 11    finasteride (PROSCAR) 5 mg tablet Take 1 tablet (5 mg total) by mouth once daily. Call PCP for refills 30 tablet 0    fluticasone (FLONASE) 50 mcg/actuation nasal spray       guaiFENesin (MUCINEX) 600 mg 12 hr tablet Take 2 tablets (1,200 mg total) by mouth 2 (two) times daily. 60 tablet 11    hydrALAZINE (APRESOLINE) 10 MG tablet Take 10 mg by mouth 3 (three) times daily.      hydrocodone-chlorpheniramine (TUSSIONEX PENNKINETIC ER) 10-8 mg/5 mL suspension Take 5 mLs by mouth.      isosorbide dinitrate (ISORDIL) 10 MG tablet Take 10 mg by mouth 3 (three) times daily.      levocetirizine (XYZAL) 5 MG tablet Take 5 mg by mouth every evening.      levoFLOXacin (LEVAQUIN) 750 MG tablet Take 1 tablet (750 mg total) by mouth once daily. 30 tablet 0    losartan (COZAAR) 100 MG tablet       lovastatin (MEVACOR) 10 MG tablet Take 10 mg by mouth every evening.      metformin (GLUCOPHAGE) 500 MG tablet Take 500 mg by mouth 2 (two) times daily with meals.      naproxen (NAPROSYN) 500 MG tablet TAKE ONE TABLET BY MOUTH TWICE DAILY AFTER MEALS FOR PAIN.  3    omeprazole (PRILOSEC) 20 MG capsule       omeprazole (PRILOSEC) 20 MG capsule Take 20 mg by mouth.      pantoprazole (PROTONIX) 40 MG tablet Take 1 tablet (40 mg total) by mouth once daily. 30 tablet 0    phenazopyridine (PYRIDIUM) 100 MG tablet Take 100 mg by mouth.      polyethylene glycol (GLYCOLAX) 17 gram PwPk Take 17 g by mouth once daily. 30 packet 0    polyethylene glycol (GLYCOLAX) 17 gram/dose powder       predniSONE (DELTASONE) 20 MG tablet Prednisone 60 mg/ day for 3 days, 40 mg/day for 3 days,20 mg/ day for 3 days, 10 mg a day for 3 days. 20 tablet 0    tamsulosin (FLOMAX) 0.4 mg Cp24 Take 1 capsule (0.4 mg total) by mouth once daily. Call PCP for refills 30 capsule 0      Facility-Administered Encounter Medications as of 1/28/2019   Medication Dose Route Frequency Provider Last Rate Last Dose    [DISCONTINUED] albuterol-ipratropium 2.5 mg-0.5 mg/3 mL nebulizer solution  3 mL Inhalation  Generic External Data Provider        [DISCONTINUED] apixaban tablet  5 mg Oral  Generic External Data Provider        [DISCONTINUED] aspirin EC tablet  81 mg Oral  Generic External Data Provider        [DISCONTINUED] atorvastatin tablet  40 mg Oral  Generic External Data Provider        [DISCONTINUED] dextrose 40 % gel  15 g Oral  Generic External Data Provider        [DISCONTINUED] dextrose 50 % in water (D50W) injection  10 g Intravenous Daily PRN Generic External Data Provider         Orders Placed This Encounter   Procedures    X-Ray Chest PA And Lateral     Standing Status:   Future     Standing Expiration Date:   7/28/2020     Order Specific Question:   Reason for Exam:     Answer:   SOB    Six Minute Walk Test to qualify for Home Oxygen     Standing Status:   Future     Standing Expiration Date:   1/28/2020     Plan:       Requested Prescriptions     Signed Prescriptions Disp Refills    guaiFENesin (MUCINEX) 600 mg 12 hr tablet 60 tablet 11     Sig: Take 2 tablets (1,200 mg total) by mouth 2 (two) times daily.     Simple chronic bronchitis  -     guaiFENesin (MUCINEX) 600 mg 12 hr tablet; Take 2 tablets (1,200 mg total) by mouth 2 (two) times daily.  Dispense: 60 tablet; Refill: 11    Chronic obstructive pulmonary disease, unspecified COPD type  -     X-Ray Chest PA And Lateral; Future; Expected date: 01/28/2019  -     Six Minute Walk Test to qualify for Home Oxygen; Future    Exercise hypoxemia  -     Six Minute Walk Test to qualify for Home Oxygen; Future           Follow-up in about 6 months (around 7/28/2019) for Review CXR, 6 min walk.    Review of medical records from hospital. Medical records from hospital were reviewed during office visit - these included but were not  limited to review of radiographic studies and /or radiologists reports, laboratory studies, discharge summaries, procedure notes, consultations and other transcribed notes.    MEDICAL DECISION MAKING: Moderate to high complexity.  Overall, the multiple problems listed are of moderate to high severity that may impact quality of life and activities of daily living. Side effects of medications, treatment plan as well as options and alternatives reviewed and discussed with patient. There was counseling of patient concerning these issues.    Total time spent in face to face counseling and coordination of care - 40 minutes over 50% of time was used in discussion of prognosis, risks, benefits of treatment, instructions and compliance with regimen . Discussion with other physicians or health care providers (homehealth, durable medical equipment providers).

## 2019-01-31 ENCOUNTER — TELEPHONE (OUTPATIENT)
Dept: PULMONOLOGY | Facility: CLINIC | Age: 79
End: 2019-01-31

## 2019-01-31 NOTE — TELEPHONE ENCOUNTER
----- Message from Consuelo Yeung sent at 1/31/2019 10:33 AM CST -----  Contact: Ange from [facility] pt has been having problems chocking on water and has crackling on front side of chest and coughing up yellow mucous and can be reached at [phone]//thanks/dbw

## 2019-02-18 ENCOUNTER — HOSPITAL ENCOUNTER (EMERGENCY)
Facility: HOSPITAL | Age: 79
Discharge: HOME | End: 2019-02-18
Attending: EMERGENCY MEDICINE
Payer: MEDICARE

## 2019-02-18 VITALS
RESPIRATION RATE: 13 BRPM | HEART RATE: 72 BPM | OXYGEN SATURATION: 95 % | SYSTOLIC BLOOD PRESSURE: 104 MMHG | BODY MASS INDEX: 23.03 KG/M2 | WEIGHT: 125.13 LBS | HEIGHT: 62 IN | DIASTOLIC BLOOD PRESSURE: 57 MMHG | TEMPERATURE: 98 F

## 2019-02-18 DIAGNOSIS — R06.02 SOB (SHORTNESS OF BREATH): ICD-10-CM

## 2019-02-18 DIAGNOSIS — J18.9 PNEUMONIA OF LOWER LOBE DUE TO INFECTIOUS ORGANISM, UNSPECIFIED LATERALITY: Primary | ICD-10-CM

## 2019-02-18 DIAGNOSIS — R10.9 ABDOMINAL PAIN: ICD-10-CM

## 2019-02-18 LAB
ALBUMIN SERPL BCP-MCNC: 4.2 G/DL
ALP SERPL-CCNC: 103 U/L
ALT SERPL W/O P-5'-P-CCNC: 17 U/L
ANION GAP SERPL CALC-SCNC: 9 MMOL/L
AST SERPL-CCNC: 29 U/L
BASOPHILS # BLD AUTO: 0.01 K/UL
BASOPHILS NFR BLD: 0.1 %
BILIRUB SERPL-MCNC: 0.7 MG/DL
BILIRUB UR QL STRIP: NEGATIVE
BNP SERPL-MCNC: 254 PG/ML
BUN SERPL-MCNC: 24 MG/DL
CALCIUM SERPL-MCNC: 9.7 MG/DL
CHLORIDE SERPL-SCNC: 104 MMOL/L
CLARITY UR: CLEAR
CO2 SERPL-SCNC: 29 MMOL/L
COLOR UR: YELLOW
CREAT SERPL-MCNC: 1.5 MG/DL
DIFFERENTIAL METHOD: ABNORMAL
EOSINOPHIL # BLD AUTO: 0.4 K/UL
EOSINOPHIL NFR BLD: 4.4 %
ERYTHROCYTE [DISTWIDTH] IN BLOOD BY AUTOMATED COUNT: 13.5 %
EST. GFR  (AFRICAN AMERICAN): 51 ML/MIN/1.73 M^2
EST. GFR  (NON AFRICAN AMERICAN): 44 ML/MIN/1.73 M^2
GLUCOSE SERPL-MCNC: 79 MG/DL
GLUCOSE UR QL STRIP: NEGATIVE
HCT VFR BLD AUTO: 46.3 %
HGB BLD-MCNC: 15.3 G/DL
HGB UR QL STRIP: ABNORMAL
INFLUENZA A, MOLECULAR: NEGATIVE
INFLUENZA B, MOLECULAR: NEGATIVE
KETONES UR QL STRIP: NEGATIVE
LACTATE SERPL-SCNC: 0.9 MMOL/L
LEUKOCYTE ESTERASE UR QL STRIP: NEGATIVE
LIPASE SERPL-CCNC: 66 U/L
LYMPHOCYTES # BLD AUTO: 1.3 K/UL
LYMPHOCYTES NFR BLD: 15.9 %
MCH RBC QN AUTO: 31.1 PG
MCHC RBC AUTO-ENTMCNC: 33 G/DL
MCV RBC AUTO: 94 FL
MONOCYTES # BLD AUTO: 1 K/UL
MONOCYTES NFR BLD: 11.7 %
NEUTROPHILS # BLD AUTO: 5.5 K/UL
NEUTROPHILS NFR BLD: 67.9 %
NITRITE UR QL STRIP: NEGATIVE
PH UR STRIP: 7 [PH] (ref 5–8)
PLATELET # BLD AUTO: 185 K/UL
PMV BLD AUTO: 10.4 FL
POTASSIUM SERPL-SCNC: 4.7 MMOL/L
PROT SERPL-MCNC: 7.8 G/DL
PROT UR QL STRIP: ABNORMAL
RBC # BLD AUTO: 4.92 M/UL
SODIUM SERPL-SCNC: 142 MMOL/L
SP GR UR STRIP: 1.01 (ref 1–1.03)
SPECIMEN SOURCE: NORMAL
TROPONIN I SERPL DL<=0.01 NG/ML-MCNC: 0.02 NG/ML
URN SPEC COLLECT METH UR: ABNORMAL
UROBILINOGEN UR STRIP-ACNC: NEGATIVE EU/DL
WBC # BLD AUTO: 8.13 K/UL

## 2019-02-18 PROCEDURE — 96375 TX/PRO/DX INJ NEW DRUG ADDON: CPT

## 2019-02-18 PROCEDURE — 25000242 PHARM REV CODE 250 ALT 637 W/ HCPCS: Performed by: EMERGENCY MEDICINE

## 2019-02-18 PROCEDURE — 83880 ASSAY OF NATRIURETIC PEPTIDE: CPT

## 2019-02-18 PROCEDURE — 25000003 PHARM REV CODE 250: Performed by: EMERGENCY MEDICINE

## 2019-02-18 PROCEDURE — 85025 COMPLETE CBC W/AUTO DIFF WBC: CPT

## 2019-02-18 PROCEDURE — 83605 ASSAY OF LACTIC ACID: CPT

## 2019-02-18 PROCEDURE — 94640 AIRWAY INHALATION TREATMENT: CPT

## 2019-02-18 PROCEDURE — 87502 INFLUENZA DNA AMP PROBE: CPT

## 2019-02-18 PROCEDURE — 96365 THER/PROPH/DIAG IV INF INIT: CPT

## 2019-02-18 PROCEDURE — 63600175 PHARM REV CODE 636 W HCPCS: Performed by: EMERGENCY MEDICINE

## 2019-02-18 PROCEDURE — 99285 EMERGENCY DEPT VISIT HI MDM: CPT | Mod: 25

## 2019-02-18 PROCEDURE — 93010 EKG 12-LEAD: ICD-10-PCS | Mod: ,,, | Performed by: INTERNAL MEDICINE

## 2019-02-18 PROCEDURE — 93010 ELECTROCARDIOGRAM REPORT: CPT | Mod: ,,, | Performed by: INTERNAL MEDICINE

## 2019-02-18 PROCEDURE — 84484 ASSAY OF TROPONIN QUANT: CPT

## 2019-02-18 PROCEDURE — 81003 URINALYSIS AUTO W/O SCOPE: CPT

## 2019-02-18 PROCEDURE — 87040 BLOOD CULTURE FOR BACTERIA: CPT

## 2019-02-18 PROCEDURE — 80053 COMPREHEN METABOLIC PANEL: CPT

## 2019-02-18 PROCEDURE — 83690 ASSAY OF LIPASE: CPT

## 2019-02-18 RX ORDER — IPRATROPIUM BROMIDE AND ALBUTEROL SULFATE 2.5; .5 MG/3ML; MG/3ML
3 SOLUTION RESPIRATORY (INHALATION)
Status: COMPLETED | OUTPATIENT
Start: 2019-02-18 | End: 2019-02-18

## 2019-02-18 RX ORDER — LEVOFLOXACIN 500 MG/1
500 TABLET, FILM COATED ORAL DAILY
Qty: 10 TABLET | Refills: 0 | Status: SHIPPED | OUTPATIENT
Start: 2019-02-18 | End: 2019-02-28

## 2019-02-18 RX ORDER — LEVOFLOXACIN 500 MG/1
500 TABLET, FILM COATED ORAL
Status: COMPLETED | OUTPATIENT
Start: 2019-02-18 | End: 2019-02-18

## 2019-02-18 RX ORDER — ALBUTEROL SULFATE 90 UG/1
1-2 AEROSOL, METERED RESPIRATORY (INHALATION) EVERY 6 HOURS PRN
Qty: 1 INHALER | Refills: 0 | Status: SHIPPED | OUTPATIENT
Start: 2019-02-18 | End: 2019-05-07 | Stop reason: SDUPTHER

## 2019-02-18 RX ORDER — MORPHINE SULFATE 10 MG/ML
4 INJECTION INTRAMUSCULAR; INTRAVENOUS; SUBCUTANEOUS
Status: COMPLETED | OUTPATIENT
Start: 2019-02-18 | End: 2019-02-18

## 2019-02-18 RX ADMIN — IPRATROPIUM BROMIDE AND ALBUTEROL SULFATE 3 ML: .5; 3 SOLUTION RESPIRATORY (INHALATION) at 05:02

## 2019-02-18 RX ADMIN — MORPHINE SULFATE 4 MG: 10 INJECTION INTRAVENOUS at 07:02

## 2019-02-18 RX ADMIN — LEVOFLOXACIN 500 MG: 500 TABLET, FILM COATED ORAL at 08:02

## 2019-02-18 RX ADMIN — PROMETHAZINE HYDROCHLORIDE 12.5 MG: 25 INJECTION INTRAMUSCULAR; INTRAVENOUS at 07:02

## 2019-02-18 NOTE — ED PROVIDER NOTES
SCRIBE #1 NOTE: I, Aliza Medina, am scribing for, and in the presence of, Torrey Harden MD. I have scribed the entire note.       History     Chief Complaint   Patient presents with    Shortness of Breath     c/o SOB and chest pressure for the last 4 days     Review of patient's allergies indicates:   Allergen Reactions    Shellfish containing products          History of Present Illness     HPI    2019, 4:42 PM  History obtained from the patient and daughter  Translated by the daughter      History of Present Illness: Baltazar Garcia is a 79 y.o. male patient with a PMHx of COPD and stroke who presents to the Emergency Department for evaluation of SOB which onset gradually 4 days ago. Symptoms are constant and moderate in severity. No mitigating or exacerbating factors reported. Associated sxs include epigastric abd pain. Daughter states pt has a hard time breathing due to the pain. Daughter denies any fever, chills, n/v/d, abd pain, CP, leg swelling, palpitations, cough, dizziness, HA, weakness, numbness, and all other sxs at this time. No further complaints or concerns at this time.       Arrival mode: Personal vehicle      PCP: Josué Faith MD        Past Medical History:  Past Medical History:   Diagnosis Date    Asthma     Diabetes mellitus     Hypertension     Tuberculosis 2002    Treated       Past Surgical History:  Past Surgical History:   Procedure Laterality Date    FRACTURE SURGERY           Family History:  Family History   Family history unknown: Yes       Social History:  Social History     Tobacco Use    Smoking status: Former Smoker     Last attempt to quit:      Years since quittin.1    Smokeless tobacco: Never Used   Substance and Sexual Activity    Alcohol use: No    Drug use: No    Sexual activity: No        Review of Systems     Review of Systems   Constitutional: Negative for chills, diaphoresis and fever.   HENT: Negative for congestion, rhinorrhea and sore  throat.    Respiratory: Positive for shortness of breath. Negative for cough and choking.    Cardiovascular: Negative for chest pain, palpitations and leg swelling.   Gastrointestinal: Positive for abdominal pain (Epigastric). Negative for blood in stool, constipation, diarrhea, nausea and vomiting.   Genitourinary: Negative for dysuria, frequency and hematuria.   Musculoskeletal: Negative for back pain and neck pain.   Skin: Negative for rash.   Neurological: Negative for dizziness, weakness, numbness and headaches.   Hematological: Does not bruise/bleed easily.   All other systems reviewed and are negative.       Physical Exam     Initial Vitals [02/18/19 1450]   BP Pulse Resp Temp SpO2   139/70 72 20 97.5 °F (36.4 °C) 98 %      MAP       --          Physical Exam  Nursing Notes and Vital Signs Reviewed.  Constitutional: Patient is in no acute distress. Well-developed and well-nourished.  Head: Atraumatic. Normocephalic.  Eyes: PERRL. EOM intact. Conjunctivae are not pale. No scleral icterus.  ENT: Mucous membranes are moist. Oropharynx is clear and symmetric.    Neck: Supple. Full ROM. No lymphadenopathy.  Cardiovascular: Regular rate. Regular rhythm. No murmurs, rubs, or gallops. Distal pulses are 2+ and symmetric.  Pulmonary/Chest: No respiratory distress. Coarse breath sounds bilaterally.  Abdominal: Soft and non-distended.  There is no tenderness.  No rebound, guarding, or rigidity.   Musculoskeletal: Moves all extremities. No obvious deformities. No edema. No calf tenderness.  Skin: Warm and dry.  Neurological:  Alert, awake, and appropriate.  Normal speech.  No acute focal neurological deficits are appreciated.  Psychiatric: Normal affect. Good eye contact. Appropriate in content.     ED Course   Procedures  ED Vital Signs:  Vitals:    02/18/19 1450 02/18/19 1501 02/18/19 1701 02/18/19 1706   BP: 139/70      Pulse: 72 65  73   Resp: 20      Temp: 97.5 °F (36.4 °C)      TempSrc: Tympanic      SpO2: 98%     "  Weight: 56.8 kg (125 lb 1.8 oz)      Height:   5' 2" (1.575 m)     02/18/19 1714 02/18/19 1718 02/18/19 1801 02/18/19 1901   BP:  (!) 155/69 123/75 134/77   Pulse: 74 69 79 73   Resp: 20 15 17 16   Temp:       TempSrc:       SpO2: 100% 100% 99% 100%   Weight:       Height:        02/18/19 1904   BP: (!) 158/84   Pulse: 79   Resp: 15   Temp:    TempSrc:    SpO2: 100%   Weight:    Height:        Abnormal Lab Results:  Labs Reviewed   CBC W/ AUTO DIFFERENTIAL - Abnormal; Notable for the following components:       Result Value    MCH 31.1 (*)     Lymph% 15.9 (*)     All other components within normal limits   B-TYPE NATRIURETIC PEPTIDE - Abnormal; Notable for the following components:     (*)     All other components within normal limits   COMPREHENSIVE METABOLIC PANEL - Abnormal; Notable for the following components:    BUN, Bld 24 (*)     Creatinine 1.5 (*)     eGFR if  51 (*)     eGFR if non  44 (*)     All other components within normal limits   URINALYSIS, REFLEX TO URINE CULTURE - Abnormal; Notable for the following components:    Protein, UA Trace (*)     Occult Blood UA Trace (*)     All other components within normal limits    Narrative:     Preferred Collection Type->Urine, Clean Catch   LIPASE - Abnormal; Notable for the following components:    Lipase 66 (*)     All other components within normal limits   INFLUENZA A & B BY MOLECULAR   CULTURE, BLOOD   CULTURE, BLOOD   TROPONIN I   LACTIC ACID, PLASMA        All Lab Results:  Results for orders placed or performed during the hospital encounter of 02/18/19   Influenza A & B by Molecular   Result Value Ref Range    Influenza A, Molecular Negative Negative    Influenza B, Molecular Negative Negative    Flu A & B Source NP    CBC auto differential   Result Value Ref Range    WBC 8.13 3.90 - 12.70 K/uL    RBC 4.92 4.60 - 6.20 M/uL    Hemoglobin 15.3 14.0 - 18.0 g/dL    Hematocrit 46.3 40.0 - 54.0 %    MCV 94 82 - 98 fL    " MCH 31.1 (H) 27.0 - 31.0 pg    MCHC 33.0 32.0 - 36.0 g/dL    RDW 13.5 11.5 - 14.5 %    Platelets 185 150 - 350 K/uL    MPV 10.4 9.2 - 12.9 fL    Gran # (ANC) 5.5 1.8 - 7.7 K/uL    Lymph # 1.3 1.0 - 4.8 K/uL    Mono # 1.0 0.3 - 1.0 K/uL    Eos # 0.4 0.0 - 0.5 K/uL    Baso # 0.01 0.00 - 0.20 K/uL    Gran% 67.9 38.0 - 73.0 %    Lymph% 15.9 (L) 18.0 - 48.0 %    Mono% 11.7 4.0 - 15.0 %    Eosinophil% 4.4 0.0 - 8.0 %    Basophil% 0.1 0.0 - 1.9 %    Differential Method Automated    Brain natriuretic peptide   Result Value Ref Range     (H) 0 - 99 pg/mL   Comprehensive metabolic panel   Result Value Ref Range    Sodium 142 136 - 145 mmol/L    Potassium 4.7 3.5 - 5.1 mmol/L    Chloride 104 95 - 110 mmol/L    CO2 29 23 - 29 mmol/L    Glucose 79 70 - 110 mg/dL    BUN, Bld 24 (H) 8 - 23 mg/dL    Creatinine 1.5 (H) 0.5 - 1.4 mg/dL    Calcium 9.7 8.7 - 10.5 mg/dL    Total Protein 7.8 6.0 - 8.4 g/dL    Albumin 4.2 3.5 - 5.2 g/dL    Total Bilirubin 0.7 0.1 - 1.0 mg/dL    Alkaline Phosphatase 103 55 - 135 U/L    AST 29 10 - 40 U/L    ALT 17 10 - 44 U/L    Anion Gap 9 8 - 16 mmol/L    eGFR if African American 51 (A) >60 mL/min/1.73 m^2    eGFR if non African American 44 (A) >60 mL/min/1.73 m^2   Troponin I   Result Value Ref Range    Troponin I 0.016 0.000 - 0.026 ng/mL   Urinalysis, Reflex to Urine Culture Urine, Clean Catch   Result Value Ref Range    Specimen UA Urine, Clean Catch     Color, UA Yellow Yellow, Straw, Samantha    Appearance, UA Clear Clear    pH, UA 7.0 5.0 - 8.0    Specific Gravity, UA 1.010 1.005 - 1.030    Protein, UA Trace (A) Negative    Glucose, UA Negative Negative    Ketones, UA Negative Negative    Bilirubin (UA) Negative Negative    Occult Blood UA Trace (A) Negative    Nitrite, UA Negative Negative    Urobilinogen, UA Negative <2.0 EU/dL    Leukocytes, UA Negative Negative   Lipase   Result Value Ref Range    Lipase 66 (H) 4 - 60 U/L   Lactic acid, plasma   Result Value Ref Range    Lactate (Lactic  Acid) 0.9 0.5 - 2.2 mmol/L       Imaging Results:  Imaging Results          CT Abdomen Pelvis  Without Contrast (Final result)  Result time 02/18/19 19:43:52    Final result by Jaquan Montero Jr., MD (02/18/19 19:43:52)                 Impression:      Chronic lung base changes.  Improved aeration in the left lower lobe.  No acute urinary tract findings.  Normal appendix.  Prostate gland enlargement.    All CT scans at this facility are performed  using dose modulation techniques as appropriate to performed exam including the following:  automated exposure control; adjustment of mA and/or kV according to the patients size (this includes techniques or standardized protocols for targeted exams where dose is matched to indication/reason for exam: i.e. extremities or head);  iterative reconstruction technique.      Electronically signed by: Jaquan Montero MD  Date:    02/18/2019  Time:    19:43             Narrative:    EXAMINATION:  CT ABDOMEN PELVIS WITHOUT CONTRAST    CLINICAL HISTORY:  Abdominal pain, unspecified;    TECHNIQUE:  Noncontrast axial images of the abdomen and pelvis were obtained.  No IV contrast.  No oral.  Multiplanar reconstruction images were produced.    COMPARISON:  06/10/2015    FINDINGS:  Emphysematous changes in the visualized lung bases.  Some improved aeration in the left lung base.  Some residual pleural thickening in the posteromedial left lower lobe.    Gallbladder, liver, spleen, pancreas, adrenal glands, aorta are unremarkable.    No renal calculi.  No obstructive uropathy.  Urinary bladder is well distended, unremarkable.  Prostate gland remains enlarged.    No oral contrast material.  No acute intestinal findings are evident.  Normal appendix.  No colitis or active diverticulitis.  No ascites.  No significant adenopathy.                               X-Ray Chest 1 View (Final result)  Result time 02/18/19 17:41:51    Final result by Jaquan Montero Jr., MD (02/18/19 17:41:51)                  Impression:      No acute findings.  Chronic changes as above no detrimental changes evident.      Electronically signed by: Mark Montero MD  Date:    02/18/2019  Time:    17:41             Narrative:    EXAMINATION:  XR CHEST 1 VIEW    CLINICAL HISTORY:  cough;    COMPARISON:  01/28/2019    FINDINGS:  Heart size is normal.  Persistent area of scarring and/or atelectasis in the right upper lung zone.  Upward retraction of the right hilum, similar to before.  Persistent minimal blunting of the left costophrenic angle..                                 The EKG was ordered, reviewed, and independently interpreted by the ED provider.  Interpretation time: 15:01  Rate: 65 BPM  Rhythm: normal sinus rhythm  Interpretation: Possible left atrial enlargement. No STEMI.             The Emergency Provider reviewed the vital signs and test results, which are outlined above.     ED Discussion     7:49 PM: Reassessed pt at this time. Patient is awake, alert, and in NAD. Pt states his condition has improved at this time. Discussed with pt all pertinent ED information and results. Discussed pt dx and plan of tx. Gave pt all f/u and return to the ED instructions. All questions and concerns were addressed at this time. Pt expresses understanding of information and instructions, and is comfortable with plan to discharge. Pt is stable for discharge.    I discussed with patient and/or family/caretaker that evaluation in the ED does not suggest any emergent or life threatening medical conditions requiring immediate intervention beyond what was provided in the ED, and I believe patient is safe for discharge.  Regardless, an unremarkable evaluation in the ED does not preclude the development or presence of a serious of life threatening condition. As such, patient was instructed to return immediately for any worsening or change in current symptoms.    ED Medication(s):  Medications   levoFLOXacin tablet 500 mg (not  administered)   albuterol-ipratropium 2.5 mg-0.5 mg/3 mL nebulizer solution 3 mL (3 mLs Nebulization Given 2/18/19 1714)   morphine injection 4 mg (4 mg Intravenous Given 2/18/19 1901)   promethazine (PHENERGAN) 12.5 mg in dextrose 5 % 50 mL IVPB (12.5 mg Intravenous New Bag 2/18/19 1900)       New Prescriptions    ALBUTEROL (PROVENTIL/VENTOLIN HFA) 90 MCG/ACTUATION INHALER    Inhale 1-2 puffs into the lungs every 6 (six) hours as needed for Wheezing. Rescue    LEVOFLOXACIN (LEVAQUIN) 500 MG TABLET    Take 1 tablet (500 mg total) by mouth once daily. for 10 days       Follow-up Information     Josué Faith MD. Call in 1 day.    Specialty:  Cardiology  Contact information:  [id number] [facility] [id number] [phone]             [facility]    Specialty:  Emergency Medicine  Why:  If symptoms worsen  Contact information:  [address] [facility] [address] [phone]                       Medical Decision Making:   Clinical Tests:   Lab Tests: Reviewed and Ordered  Radiological Study: Reviewed and Ordered  Medical Tests: Reviewed and Ordered             Scribe Attestation:   Scribe #1: I performed the above scribed service and the documentation accurately describes the services I performed. I attest to the accuracy of the note.     Attending:   Physician Attestation Statement for Scribe #1: I, Torrey Harden MD, personally performed the services described in this documentation, as scribed by Aliza Medina, in my presence, and it is both accurate and complete.           Clinical Impression       ICD-10-CM ICD-9-CM   1. Pneumonia of lower lobe due to infectious organism, unspecified laterality J18.1 483.8   2. SOB (shortness of breath) R06.02 786.05   3. Abdominal pain R10.9 789.00       Disposition:   Disposition: Discharged  Condition: Stable  ;     Torrey Harden MD  02/26/19 0945

## 2019-02-23 LAB
BACTERIA BLD CULT: NORMAL
BACTERIA BLD CULT: NORMAL

## 2019-04-17 DIAGNOSIS — J44.9 CHRONIC OBSTRUCTIVE PULMONARY DISEASE, UNSPECIFIED COPD TYPE: ICD-10-CM

## 2019-04-17 RX ORDER — ALBUTEROL SULFATE 0.83 MG/ML
2.5 SOLUTION RESPIRATORY (INHALATION) EVERY 6 HOURS PRN
Qty: 360 ML | Refills: 11 | Status: SHIPPED | OUTPATIENT
Start: 2019-04-17 | End: 2020-01-21 | Stop reason: ALTCHOICE

## 2019-04-17 NOTE — TELEPHONE ENCOUNTER
----- Message from Kwasi Salazar sent at 4/17/2019  3:18 PM CDT -----  Contact: Pt daughter   Pt daughter is calling .Type:  RX Refill Request    Who Called: Pt   Refill or New Rx: New Rx   RX Name and Strength:albuterol (PROVENTIL) 2.5 mg /3 mL (0.083 %) nebulizer solution  How is the patient currently taking it? (ex. 1XDay): every 6 (six) hours as needed   Is this a 30 day or 90 day RX: 360 mL  Preferred Pharmacy with phone number: [facility] is requesting New Script   Local or Mail Order: Mail Order   Ordering Provider: Dr. Iverson   Would the patient rather a call back or a response via [facility]? Call Back   Best Call Back Number:[phone]         .Thank You  Carissa Salazar

## 2019-04-18 NOTE — TELEPHONE ENCOUNTER
----- Message from Heena De La Cruz sent at 4/18/2019 11:24 AM CDT -----  Contact: pts daughter (Brisa)  Caller request call back , pt currently in hospital ,     .Type:  RX Refill Request    Who Called:  Pt daughter   Refill or New Rx: refill  RX Name and Strength: nebulizer treatment   How is the patient currently taking it? (ex. 1XDay):   Is this a 30 day or 90 day RX:  Preferred Pharmacy with phone number:   [facility] (for shipment )      Local or Mail Order: mail order   Ordering Provider: Kishor   Would the patient rather a call back or a response via [facility]?  Call back   Best Call Back Number: [phone]  Additional Information:  Pt needs refill before pt is discharged

## 2019-04-24 ENCOUNTER — TELEPHONE (OUTPATIENT)
Dept: PULMONOLOGY | Facility: CLINIC | Age: 79
End: 2019-04-24

## 2019-04-24 NOTE — TELEPHONE ENCOUNTER
----- Message from Consuelo Yeung sent at 4/24/2019  4:37 PM CDT -----  Contact: FOX Coe   Type:  Sooner Apoointment Request    Caller is requesting a sooner appointment.  Caller declined first available appointment listed below.  Caller will not accept being placed on the waitlist and is requesting a message be sent to doctor.  Name of Caller: stefanie  When is the first available appointment?05/22/2019  Symptoms:1 week hospital follow up/ FOX/  Would the patient rather a call back or a response via [facility]? Phone   Best Call Back Number: [phone]  Additional Information: States pt is being discharged and to pls contact him for the appt

## 2019-05-07 ENCOUNTER — OFFICE VISIT (OUTPATIENT)
Dept: PULMONOLOGY | Facility: CLINIC | Age: 79
End: 2019-05-07
Payer: MEDICARE

## 2019-05-07 VITALS
SYSTOLIC BLOOD PRESSURE: 110 MMHG | BODY MASS INDEX: 23.08 KG/M2 | WEIGHT: 125.44 LBS | HEIGHT: 62 IN | HEART RATE: 64 BPM | OXYGEN SATURATION: 99 % | DIASTOLIC BLOOD PRESSURE: 55 MMHG | RESPIRATION RATE: 16 BRPM

## 2019-05-07 DIAGNOSIS — I69.391 DYSPHAGIA AS LATE EFFECT OF CEREBROVASCULAR ACCIDENT (CVA): ICD-10-CM

## 2019-05-07 DIAGNOSIS — A31.0 MAIC (MYCOBACTERIUM AVIUM-INTRACELLULARE COMPLEX): ICD-10-CM

## 2019-05-07 DIAGNOSIS — J44.9 CHRONIC OBSTRUCTIVE PULMONARY DISEASE, UNSPECIFIED COPD TYPE: ICD-10-CM

## 2019-05-07 DIAGNOSIS — R05.9 COUGH: ICD-10-CM

## 2019-05-07 DIAGNOSIS — J96.21 ACUTE ON CHRONIC RESPIRATORY FAILURE WITH HYPOXIA: ICD-10-CM

## 2019-05-07 DIAGNOSIS — J18.9 PNEUMONIA OF RIGHT LOWER LOBE DUE TO INFECTIOUS ORGANISM: Primary | ICD-10-CM

## 2019-05-07 PROCEDURE — 99214 OFFICE O/P EST MOD 30 MIN: CPT | Mod: PBBFAC | Performed by: INTERNAL MEDICINE

## 2019-05-07 PROCEDURE — 99215 PR OFFICE/OUTPT VISIT, EST, LEVL V, 40-54 MIN: ICD-10-PCS | Mod: S$PBB,,, | Performed by: INTERNAL MEDICINE

## 2019-05-07 PROCEDURE — 99999 PR PBB SHADOW E&M-EST. PATIENT-LVL IV: ICD-10-PCS | Mod: PBBFAC,,, | Performed by: INTERNAL MEDICINE

## 2019-05-07 PROCEDURE — 99215 OFFICE O/P EST HI 40 MIN: CPT | Mod: S$PBB,,, | Performed by: INTERNAL MEDICINE

## 2019-05-07 PROCEDURE — 99999 PR PBB SHADOW E&M-EST. PATIENT-LVL IV: CPT | Mod: PBBFAC,,, | Performed by: INTERNAL MEDICINE

## 2019-05-07 RX ORDER — FLUTICASONE FUROATE AND VILANTEROL TRIFENATATE 200; 25 UG/1; UG/1
POWDER RESPIRATORY (INHALATION)
COMMUNITY
Start: 2019-05-07 | End: 2019-05-07 | Stop reason: SDUPTHER

## 2019-05-07 RX ORDER — FLUTICASONE FUROATE AND VILANTEROL TRIFENATATE 200; 25 UG/1; UG/1
1 POWDER RESPIRATORY (INHALATION) DAILY
Qty: 30 EACH | Refills: 11 | Status: SHIPPED | OUTPATIENT
Start: 2019-05-07 | End: 2019-08-05 | Stop reason: SDUPTHER

## 2019-05-07 RX ORDER — BENZONATATE 100 MG/1
100 CAPSULE ORAL 3 TIMES DAILY PRN
Qty: 90 CAPSULE | Refills: 4 | Status: SHIPPED | OUTPATIENT
Start: 2019-05-07 | End: 2019-05-14

## 2019-05-07 RX ORDER — ALBUTEROL SULFATE 90 UG/1
2 AEROSOL, METERED RESPIRATORY (INHALATION) EVERY 4 HOURS PRN
Qty: 1 INHALER | Refills: 11 | Status: SHIPPED | OUTPATIENT
Start: 2019-05-07 | End: 2019-08-05 | Stop reason: SDUPTHER

## 2019-05-07 RX ORDER — CIPROFLOXACIN 250 MG/1
TABLET, FILM COATED ORAL
COMMUNITY
Start: 2019-04-24 | End: 2020-01-21 | Stop reason: ALTCHOICE

## 2019-05-07 RX ORDER — BENZONATATE 100 MG/1
100 CAPSULE ORAL 3 TIMES DAILY PRN
COMMUNITY
Start: 2019-05-04 | End: 2019-05-07 | Stop reason: SDUPTHER

## 2019-05-07 RX ORDER — PROMETHAZINE HYDROCHLORIDE AND DEXTROMETHORPHAN HYDROBROMIDE 6.25; 15 MG/5ML; MG/5ML
5 SYRUP ORAL 4 TIMES DAILY PRN
Qty: 240 ML | Refills: 1 | Status: SHIPPED | OUTPATIENT
Start: 2019-05-07 | End: 2019-05-17

## 2019-05-07 RX ORDER — IPRATROPIUM BROMIDE AND ALBUTEROL SULFATE 2.5; .5 MG/3ML; MG/3ML
3 SOLUTION RESPIRATORY (INHALATION)
COMMUNITY
Start: 2019-05-04 | End: 2019-05-18

## 2019-05-07 NOTE — PROGRESS NOTES
Subjective:       Patient ID: Baltazar Garcia is a 79 y.o. male.    Chief Complaint:   He   presents for evaluation and treatment of pneumonia  after being discharged from the hospital  3  days ago. Since discharge symptoms have gradually improving course since that time. Patient denies fever or chills. Symptoms are aggravated by activity. Symptoms improve with rest.  Respiratory: positive for dyspnea on exertion and sputum; Cardiovascular: no chest pain or palpitations.  Patient currently is on oxygen at 2 L/min per nasal cannula..  MEDICAL RECORDS FROM THE HOSPITAL REVIEWED:  Two pharnygograms  One at [facility] - worked with speech therapy  The other at [facility]     COPD; Asthma; and Pneumonia    HPI  Past Medical History:   Diagnosis Date    Asthma     Diabetes mellitus     Hypertension     Pneumonia     Tuberculosis     Treated     Past Surgical History:   Procedure Laterality Date    FRACTURE SURGERY       Social History     Socioeconomic History    Marital status:      Spouse name: Not on file    Number of children: Not on file    Years of education: Not on file    Highest education level: Not on file   Occupational History    Not on file   Social Needs    Financial resource strain: Not on file    Food insecurity:     Worry: Not on file     Inability: Not on file    Transportation needs:     Medical: Not on file     Non-medical: Not on file   Tobacco Use    Smoking status: Former Smoker     Last attempt to quit:      Years since quittin.3    Smokeless tobacco: Never Used   Substance and Sexual Activity    Alcohol use: No    Drug use: No    Sexual activity: Never   Lifestyle    Physical activity:     Days per week: Not on file     Minutes per session: Not on file    Stress: Not on file   Relationships    Social connections:     Talks on phone: Not on file     Gets together: Not on file     Attends Caodaism service:  Not on file     Active member of club or organization: Not on file     Attends meetings of clubs or organizations: Not on file     Relationship status: Not on file   Other Topics Concern    Not on file   Social History Narrative    Not on file     Review of Systems   Constitutional: Positive for fatigue. Negative for fever.   HENT: Positive for postnasal drip, rhinorrhea and congestion.    Eyes: Negative for redness and itching.   Respiratory: Positive for cough, sputum production, shortness of breath, dyspnea on extertion, use of rescue inhaler and Paroxysmal Nocturnal Dyspnea.    Cardiovascular: Negative for chest pain, palpitations and leg swelling.   Genitourinary: Negative for difficulty urinating and hematuria.   Endocrine: Negative for cold intolerance and heat intolerance.    Skin: Negative for rash.   Gastrointestinal: Negative for nausea and abdominal pain.   Neurological: Negative for dizziness, syncope, weakness and light-headedness.   Hematological: Negative for adenopathy. Does not bruise/bleed easily.   Psychiatric/Behavioral: Negative for sleep disturbance. The patient is not nervous/anxious.        Objective:      Physical Exam   Constitutional: He is oriented to person, place, and time. He appears well-developed and well-nourished.   HENT:   Head: Normocephalic and atraumatic.   Mouth/Throat: Oropharyngeal exudate present.   Eyes: Pupils are equal, round, and reactive to light. Conjunctivae are normal.   Neck: Neck supple. No JVD present. No tracheal deviation present. No thyromegaly present.   Cardiovascular: Normal rate, regular rhythm and normal heart sounds.   No murmur heard.  Pulmonary/Chest: Effort normal. He has decreased breath sounds. He has wheezes in the right lower field and the left lower field. He has no rhonchi. He has no rales.   Abdominal: Soft. Bowel sounds are normal.   Musculoskeletal: Normal range of motion. He exhibits no edema or tenderness.   Lymphadenopathy:     He has  no cervical adenopathy.   Neurological: He is alert and oriented to person, place, and time.   Skin: Skin is warm and dry.   Nursing note and vitals reviewed.    Personal Diagnostic Review  CT of chest performed on 4/22/2019without contrast revealed right lower lobe pneumonia .    CT Chest without Contrast4/22/2019  [facility]  Result Impression     1. There are chronic fibrotic changes as noted on 4/2/18.  2. The left-sided pneumonia has largely resolved.  3. There is some patchy alveolar infiltrate in the right lower lobe which appears active.   Result Narrative   CT CHEST WO CONTRAST    Automated exposure control was used for dose reduction.    History of pulmonary tuberculosis; pneumonia; possible pulmonary abscess.    Noncontrast imaging was performed as requested. There are chronic fibrotic changes noted in the right upper lobe and to a lesser extent in the left upper lobe. These are present on a prior study dated 4/2/18. The alveolar consolidation previously noted in the left upper lobe has resolved. Mild streaky opacity is stable in the left lower lobe with mild left lower lobe atelectasis.    Mild patchy alveolar opacity is noted in the right lower lobe. This is less extensive than was seen on 4/2/18. No pulmonary abscess is detected.   Other Result Information   Interface, Rad Results In - 04/22/2019  4:15 PM CDT  CT CHEST WO CONTRAST    Automated exposure control was used for dose reduction.    History of pulmonary tuberculosis; pneumonia; possible pulmonary abscess.    Noncontrast imaging was performed as requested. There are chronic fibrotic changes noted in the right upper lobe and to a lesser extent in the left upper lobe. These are present on a prior study dated 4/2/18. The alveolar consolidation previously noted in the left upper lobe has resolved. Mild streaky opacity is stable in the left lower lobe with mild left lower lobe atelectasis.    Mild patchy  alveolar opacity is noted in the right lower lobe. This is less extensive than was seen on 4/2/18. No pulmonary abscess is detected.    IMPRESSION:    1. There are chronic fibrotic changes as noted on 4/2/18.  2. The left-sided pneumonia has largely resolved.  3. There is some patchy alveolar infiltrate in the right lower lobe which appears active.   Status Results Details           .GMGPFTNEW  No flowsheet data found.        Assessment:       1. Pneumonia of right lower lobe due to infectious organism    2. Acute on chronic respiratory failure with hypoxia    3. MAIC (mycobacterium avium-intracellulare complex)    4. Dysphagia as late effect of cerebrovascular accident (CVA)    5. Chronic obstructive pulmonary disease, unspecified COPD type    6. Cough        Outpatient Encounter Medications as of 5/7/2019   Medication Sig Dispense Refill    albuterol-ipratropium (DUO-NEB) 2.5 mg-0.5 mg/3 mL nebulizer solution Inhale 3 mLs into the lungs.      benzonatate (TESSALON) 100 MG capsule Take 1 capsule (100 mg total) by mouth 3 (three) times daily as needed. 90 capsule 4    dextromethorphan-guaifenesin  mg (MUCINEX DM)  mg per 12 hr tablet Take 1 tablet by mouth.      Lactobacillus rhamnosus GG (CULTURELLE) 10 billion cell capsule Take 1 capsule by mouth.      sodium chloride (ALTAMIST) 0.65 % nasal spray 1 spray by Nasal route every 4 (four) hours as needed.      [DISCONTINUED] benzonatate (TESSALON) 100 MG capsule Take 100 mg by mouth 3 (three) times daily as needed.      albuterol (PROVENTIL) 2.5 mg /3 mL (0.083 %) nebulizer solution Take 3 mLs (2.5 mg total) by nebulization every 6 (six) hours as needed for Wheezing or Shortness of Breath. 360 mL 11    albuterol (PROVENTIL/VENTOLIN HFA) 90 mcg/actuation inhaler Inhale 2 puffs into the lungs every 4 (four) hours as needed for Wheezing or Shortness of Breath. 1 Inhaler 11    apixaban 2.5 mg Tab Take 1 tablet (2.5 mg total) by mouth 2 (two) times  daily. 60 tablet 5    aspirin (ECOTRIN) 81 MG EC tablet Take 81 mg by mouth.      atorvastatin (LIPITOR) 40 MG tablet Take 40 mg by mouth nightly.  11    BREO ELLIPTA 200-25 mcg/dose DsDv diskus inhaler Inhale 1 puff into the lungs once daily. 30 each 11    ciprofloxacin HCl (CIPRO) 250 MG tablet       doxepin (SINEQUAN) 10 MG capsule Take 10 mg by mouth.      finasteride (PROSCAR) 5 mg tablet Take 1 tablet (5 mg total) by mouth once daily. Call PCP for refills 30 tablet 0    pantoprazole (PROTONIX) 40 MG tablet Take 1 tablet (40 mg total) by mouth once daily. 30 tablet 0    polyethylene glycol (GLYCOLAX) 17 gram/dose powder       promethazine-dextromethorphan (PROMETHAZINE-DM) 6.25-15 mg/5 mL Syrp Take 5 mLs by mouth 4 (four) times daily as needed (cough). 240 mL 1    tamsulosin (FLOMAX) 0.4 mg Cp24 Take 1 capsule (0.4 mg total) by mouth once daily. Call PCP for refills 30 capsule 0    [DISCONTINUED] albuterol (PROVENTIL/VENTOLIN HFA) 90 mcg/actuation inhaler Inhale 1-2 puffs into the lungs every 6 (six) hours as needed for Wheezing. Rescue 1 Inhaler 0    [DISCONTINUED] albuterol 90 mcg/actuation inhaler Inhale 1-2 puffs into the lungs every 6 (six) hours as needed for Wheezing. 1 Inhaler 11    [DISCONTINUED] azithromycin (ZITHROMAX Z-XIOMY) 250 MG tablet Take 1 tablet (250 mg total) by mouth once daily. 500 mg on day 1 (two tablets) followed by 250 mg once daily on days 2-5 6 tablet 1    [DISCONTINUED] BREO ELLIPTA 200-25 mcg/dose DsDv diskus inhaler       [DISCONTINUED] carvedilol (COREG) 3.125 MG tablet Take 6.25 mg by mouth.       [DISCONTINUED] diltiaZEM (CARDIZEM CD) 120 MG Cp24 TAKE 1 CAPSULE (120 MG TOTAL) BY MOUTH ONCE DAILY. CALL PCP FOR REFILLS 30 capsule 11    [DISCONTINUED] fluticasone (FLONASE) 50 mcg/actuation nasal spray       [DISCONTINUED] fluticasone-salmeterol 250-50 mcg/dose (ADVAIR DISKUS) 250-50 mcg/dose diskus inhaler Inhale 1 puff into the lungs 2 (two) times daily.  Controller 60 each 11    [DISCONTINUED] folic acid-vit B6-vit B12 2.5-25-2 mg (FOLBIC OR EQUIV) 2.5-25-2 mg Tab Take 1 tablet by mouth once daily. 30 tablet 6    [DISCONTINUED] hydrALAZINE (APRESOLINE) 10 MG tablet Take 10 mg by mouth 3 (three) times daily.      [DISCONTINUED] hydrocodone-chlorpheniramine (TUSSIONEX PENNKINETIC ER) 10-8 mg/5 mL suspension Take 5 mLs by mouth.      [DISCONTINUED] hydrocodone-chlorpheniramine (TUSSIONEX) 10-8 mg/5 mL suspension Take 5 mLs by mouth every 12 (twelve) hours as needed for Cough.      [DISCONTINUED] isosorbide dinitrate (ISORDIL) 10 MG tablet Take 10 mg by mouth 3 (three) times daily.      [DISCONTINUED] levocetirizine (XYZAL) 5 MG tablet Take 5 mg by mouth every evening.      [DISCONTINUED] levoFLOXacin (LEVAQUIN) 750 MG tablet Take 1 tablet (750 mg total) by mouth once daily. 30 tablet 0    [DISCONTINUED] losartan (COZAAR) 100 MG tablet       [DISCONTINUED] lovastatin (MEVACOR) 10 MG tablet Take 10 mg by mouth every evening.      [DISCONTINUED] metformin (GLUCOPHAGE) 500 MG tablet Take 500 mg by mouth 2 (two) times daily with meals.      [DISCONTINUED] naproxen (NAPROSYN) 500 MG tablet TAKE ONE TABLET BY MOUTH TWICE DAILY AFTER MEALS FOR PAIN.  3    [DISCONTINUED] omeprazole (PRILOSEC) 20 MG capsule       [DISCONTINUED] omeprazole (PRILOSEC) 20 MG capsule Take 20 mg by mouth.      [DISCONTINUED] phenazopyridine (PYRIDIUM) 100 MG tablet Take 100 mg by mouth.      [DISCONTINUED] polyethylene glycol (GLYCOLAX) 17 gram PwPk Take 17 g by mouth once daily. 30 packet 0    [DISCONTINUED] predniSONE (DELTASONE) 20 MG tablet Prednisone 60 mg/ day for 3 days, 40 mg/day for 3 days,20 mg/ day for 3 days, 10 mg a day for 3 days. 20 tablet 0     No facility-administered encounter medications on file as of 5/7/2019.      Orders Placed This Encounter   Procedures    X-Ray Chest PA And Lateral     Standing Status:   Future     Standing Expiration Date:   11/7/2020      Order Specific Question:   Reason for Exam:     Answer:   SOB     Plan:       Requested Prescriptions     Signed Prescriptions Disp Refills    benzonatate (TESSALON) 100 MG capsule 90 capsule 4     Sig: Take 1 capsule (100 mg total) by mouth 3 (three) times daily as needed.    BREO ELLIPTA 200-25 mcg/dose DsDv diskus inhaler 30 each 11     Sig: Inhale 1 puff into the lungs once daily.    albuterol (PROVENTIL/VENTOLIN HFA) 90 mcg/actuation inhaler 1 Inhaler 11     Sig: Inhale 2 puffs into the lungs every 4 (four) hours as needed for Wheezing or Shortness of Breath.    promethazine-dextromethorphan (PROMETHAZINE-DM) 6.25-15 mg/5 mL Syrp 240 mL 1     Sig: Take 5 mLs by mouth 4 (four) times daily as needed (cough).     Pneumonia of right lower lobe due to infectious organism  -     X-Ray Chest PA And Lateral; Future; Expected date: 05/07/2019    Acute on chronic respiratory failure with hypoxia  -     X-Ray Chest PA And Lateral; Future; Expected date: 05/07/2019    MAIC (mycobacterium avium-intracellulare complex)    Dysphagia as late effect of cerebrovascular accident (CVA)    Chronic obstructive pulmonary disease, unspecified COPD type  -     BREO ELLIPTA 200-25 mcg/dose DsDv diskus inhaler; Inhale 1 puff into the lungs once daily.  Dispense: 30 each; Refill: 11  -     albuterol (PROVENTIL/VENTOLIN HFA) 90 mcg/actuation inhaler; Inhale 2 puffs into the lungs every 4 (four) hours as needed for Wheezing or Shortness of Breath.  Dispense: 1 Inhaler; Refill: 11    Cough  -     benzonatate (TESSALON) 100 MG capsule; Take 1 capsule (100 mg total) by mouth 3 (three) times daily as needed.  Dispense: 90 capsule; Refill: 4  -     promethazine-dextromethorphan (PROMETHAZINE-DM) 6.25-15 mg/5 mL Syrp; Take 5 mLs by mouth 4 (four) times daily as needed (cough).  Dispense: 240 mL; Refill: 1    MAIC (mycobacterium avium-intracellulare complex)  5/9/2019 Monitor awaiting sensitivity profiles           Follow up in about 3 months  (around 8/5/2019) for Review CXR.    Review of medical records from hospital. Medical records from hospital were reviewed during office visit - these included but were not limited to review of radiographic studies and /or radiologists reports, laboratory studies, discharge summaries, procedure notes, consultations and other transcribed notes.    MEDICAL DECISION MAKING: Moderate to high complexity.  Overall, the multiple problems listed are of moderate to high severity that may impact quality of life and activities of daily living. Side effects of medications, treatment plan as well as options and alternatives reviewed and discussed with patient. There was counseling of patient concerning these issues.    Total time spent in face to face counseling and coordination of care - 40 minutes over 50% of time was used in discussion of prognosis, risks, benefits of treatment, instructions and compliance with regimen . Discussion with other physicians or health care providers (homehealth, durable medical equipment providers).

## 2019-05-13 ENCOUNTER — OFFICE VISIT (OUTPATIENT)
Dept: NEUROLOGY | Facility: CLINIC | Age: 79
End: 2019-05-13
Payer: MEDICARE

## 2019-05-13 VITALS
HEART RATE: 62 BPM | WEIGHT: 123.88 LBS | HEIGHT: 62 IN | DIASTOLIC BLOOD PRESSURE: 68 MMHG | BODY MASS INDEX: 22.8 KG/M2 | SYSTOLIC BLOOD PRESSURE: 100 MMHG

## 2019-05-13 DIAGNOSIS — E78.5 HYPERLIPIDEMIA, UNSPECIFIED HYPERLIPIDEMIA TYPE: ICD-10-CM

## 2019-05-13 DIAGNOSIS — I48.92 PAROXYSMAL ATRIAL FLUTTER: Primary | ICD-10-CM

## 2019-05-13 DIAGNOSIS — I69.30 LATE EFFECT OF STROKE: ICD-10-CM

## 2019-05-13 DIAGNOSIS — M47.812 SPONDYLOSIS OF CERVICAL REGION WITHOUT MYELOPATHY OR RADICULOPATHY: ICD-10-CM

## 2019-05-13 PROCEDURE — 99999 PR PBB SHADOW E&M-EST. PATIENT-LVL III: CPT | Mod: PBBFAC,,, | Performed by: PSYCHIATRY & NEUROLOGY

## 2019-05-13 PROCEDURE — 99213 OFFICE O/P EST LOW 20 MIN: CPT | Mod: PBBFAC | Performed by: PSYCHIATRY & NEUROLOGY

## 2019-05-13 PROCEDURE — 99204 PR OFFICE/OUTPT VISIT, NEW, LEVL IV, 45-59 MIN: ICD-10-PCS | Mod: S$PBB,,, | Performed by: PSYCHIATRY & NEUROLOGY

## 2019-05-13 PROCEDURE — 99999 PR PBB SHADOW E&M-EST. PATIENT-LVL III: ICD-10-PCS | Mod: PBBFAC,,, | Performed by: PSYCHIATRY & NEUROLOGY

## 2019-05-13 PROCEDURE — 99204 OFFICE O/P NEW MOD 45 MIN: CPT | Mod: S$PBB,,, | Performed by: PSYCHIATRY & NEUROLOGY

## 2019-05-13 NOTE — PROGRESS NOTES
Subjective:      Patient ID: Baltazar Garcia is a 79 y.o. male.  Informant:  Patient's daughter    Chief Complaint: He has chronic headache    The patient's daughter who is also acting as  indicates that her father is complaining of a headache that is present constantly since he had had a stroke in January, 2019. The patient's daughter indicates that the stroke that occurred was manifested as left-sided weakness as well as some difficulty with numbness and tingling on the left side.  At the present time, his primary complaint regarding the stroke is that of an occasional sensation of numbness or lack of sensation in the left hand and arm.  However since the event in January, he apparently has continued to complain of headache.    The headache is felt primarily in the back of the head and neck and then spreads to the top or sides of the head without side preference.  The pain is described as a steady aching discomfort which appears to be aggravated by sudden abrupt weather changes.  He has not been aware of neck stiffness but is apparently aware of lack of neck mobility.  The headache is not associated with any nausea or vomiting.  The daughter indicates that he had been taking naproxen with good benefit of the headache but then had been noted to have some difficulties with renal function and those medications were discontinued.  Apparently, standard doses of Tylenol do not seem to produce much benefit for the patient.    The neck pain does not radiate into the shoulders or arms.  He continues to be aware of left hand and arm numbness and tingling that is at times intermittent but does not seem to interfere with activities of daily living.  He is ambulatory but utilizes a single-point cane for safety.  The patient's daughter indicates that she has noted that he will occasionally be shuffling in walking.  The patient has not had any difficulty with speech or swallowing.      ROS:  GENERAL: NO FEVER, CHILLS,  FATIGABILITY OR WEIGHT LOSS.  SKIN: NO RASHES, ITCHING OR CHANGES IN COLOR OR TEXTURE OF SKIN.  HEAD: NO RECENT HEAD   OR NECK TRAUMA.  EYES: VISUAL ACUITY FINE. NO PHOTOPHOBIA, OCULAR PAIN OR DIPLOPIA.  EARS: DENIES EAR PAIN, DISCHARGE OR VERTIGO.  NOSE: NO LOSS OF SMELL, NO EPISTAXIS OR POSTNASAL DRIP.  MOUTH & THROAT: NO HOARSENESS OR CHANGE IN VOICE. NO EXCESSIVE GUM BLEEDING.  NODES: DENIES SWOLLEN GLANDS.  CHEST: DENIES LANDA, CYANOSIS, WHEEZING, COUGH AND SPUTUM PRODUCTION.  CARDIOVASCULAR: DENIES CHEST PAIN, PND, ORTHOPNEA OR REDUCED EXERCISE TOLERANCE.  ABDOMEN: APPETITE FINE. NO WEIGHT LOSS. DENIES DIARRHEA, ABDOMINAL PAIN, HEMATEMESIS OR BLOOD IN STOOL.  URINARY: NO FLANK PAIN, DYSURIA OR HEMATURIA.  PERIPHERAL VASCULAR: NO CLAUDICATION OR CYANOSIS.  MUSCULOSKELETAL:  THE PATIENT DENIES ANY EXTREMITY JOINT PAIN OR STIFFNESS.  NEUROLOGIC:   SEE COMMENTS IN PRESENT ILLNESS    Past Medical History:   Diagnosis Date    Asthma     Diabetes mellitus     pt/states no meds and no diabetes    Hypertension     Pneumonia     Tuberculosis 2002    Treated     Past Surgical History:   Procedure Laterality Date    FRACTURE SURGERY       Family History   Family history unknown: Yes     Social History     Socioeconomic History    Marital status:      Spouse name: Not on file    Number of children: Not on file    Years of education: Not on file    Highest education level: Not on file   Occupational History    Not on file   Social Needs    Financial resource strain: Not on file    Food insecurity:     Worry: Not on file     Inability: Not on file    Transportation needs:     Medical: Not on file     Non-medical: Not on file   Tobacco Use    Smoking status: Former Smoker     Last attempt to quit:      Years since quittin.3    Smokeless tobacco: Never Used   Substance and Sexual Activity    Alcohol use: No    Drug use: No    Sexual activity: Never   Lifestyle    Physical activity:     Days  per week: Not on file     Minutes per session: Not on file    Stress: Not on file   Relationships    Social connections:     Talks on phone: Not on file     Gets together: Not on file     Attends Holiness service: Not on file     Active member of club or organization: Not on file     Attends meetings of clubs or organizations: Not on file     Relationship status: Not on file   Other Topics Concern    Not on file   Social History Narrative    Not on file         Objective:   PE:   VITAL SIGNS:  HEIGHT 5 FT 2 IN, WEIGHT 56.2 KG, BMI 22.66  Vitals:    05/13/19 1059   BP: 100/68   Pulse: 62     APPEARANCE: WELL NOURISHED, WELL DEVELOPED, IN NO ACUTE DISTRESS.  THE PATIENT DOES NOT APPEAR TO BE IN ANY ACUTE PAIN AT THE TIME OF THIS VISIT.   HEAD: NORMOCEPHALIC, ATRAUMATIC.  NO TEMPORALIS MUSCLE TENDERNESS.  THE TEMPORAL ARTERY IS NOT TENDER TO PALPATION.  EYES: PERRL. EOMI.  NON-ICTERIC SCLERAE.    EARS: TM'S INTACT. LIGHT REFLEX NORMAL. NO RETRACTION OR PERFORATION.    NOSE: MUCOSA PINK. AIRWAY CLEAR.  MOUTH & THROAT: NO TONSILLAR ENLARGEMENT. NO PHARYNGEAL ERYTHEMA OR EXUDATE. NO STRIDOR.  NECK:   THE PATIENT HAS SIGNIFICANT LIMITATION OF MOTION OF THE NECK INCLUDING ROTATION OF THE CHIN FROM SIDE TO SIDE AS WELL AS MIDLINE FLEXION EXTENSION AND LATERAL FLEXION OF THE EARS TOWARDS THE SHOULDER.  THERE IS PALPABLE CERVICAL PARASPINOUS MUSCLE SPASM PRESENT BILATERALLY WHICH EXTENDS INTO THE TRAPEZIUS MUSCLES AS WELL.  CHEST: LUNGS CLEAR TO AUSCULTATION.  CARDIOVASCULAR: REGULAR RHYTHM WITHOUT SIGNIFICANT MURMURS.  MUSCULOSKELETAL:  NO BONY DEFORMITY SEEN.  MUSCLE TONE   AND MUSCLE MASS ARE NORMAL IN BOTH UPPER AND BOTH LOWER EXTREMITIES.    NEUROLOGIC:   MENTAL STATUS:  THE PATIENT IS WELL ORIENTED TO PERSON, TIME, PLACE, AND SITUATION.  THE PATIENT IS ATTENTIVE TO THE ENVIRONMENT AND COOPERATIVE FOR THE EXAM.  CRANIAL NERVES: II-XII GROSSLY INTACT. FUNDOSCOPIC EXAM IS NORMAL.  NO HEMORRHAGE, EXUDATE OR PAPILLEDEMA  IS PRESENT. THE EXTRAOCULAR MUSCLES ARE INTACT IN THE CARDINAL DIRECTIONS OF GAZE.  NO PTOSIS IS PRESENT. FACIAL FEATURES ARE SYMMETRICAL.  SPEECH IS NORMAL IN FLUENCY, DICTION, AND PHRASING.  TONGUE PROTRUDES IN THE MIDLINE.    GAIT AND STATION:  ROMBERG IS NEGATIVE.  GOOD ALTERNATE ARMSWING WITH NORMAL GAIT.  MOTOR:  NO DOWNDRIFT OF EITHER ARM WHEN HELD AT SHOULDER LEVEL.  MANUAL MUSCLE TESTING OF PROXIMAL AND DISTAL MUSCLES OF BOTH UPPER AND LOWER EXTREMITIES IS NORMAL. MUSCLE MASS IS NORMAL.  MUSCLE TONE IS NORMAL.  SENSORY:  INTACT BOTH UPPER AND LOWER EXTREMITIES TO PIN PRICK, TOUCH, AND VIBRATION.  CEREBELLAR:  FINGER TO NOSE DONE WELL.  ALTERNATING MOVEMENTS INTACT.  NO INVOLUNTARY MOVEMENTS OR TREMOR SEEN.  REFLEXES:  STRETCH REFLEXES ARE 2+ BOTH UPPER AND LOWER EXTREMITIES.  PLANTAR STIMULATION IS FLEXOR BILATERALLY AND NO PATHOLOGICAL REFLEXES ARE SEEN              Assessment:     Encounter Diagnoses   Name Primary?    Late effect of stroke     Spondylosis of cervical region without myelopathy or radiculopathy     Paroxysmal atrial flutter Yes    Hyperlipidemia, unspecified hyperlipidemia type          Discussion:   The patient is describing a chronic daily headache which I suspect is related to cervical spondylosis.  I am not certain this has any relationship to his stroke.  He does have limitation of motion of the neck as well as palpable cervical paraspinous muscle spasm.  A trial of physical therapy with modalities to reduce muscle spasm and increase neck mobility is recommended.    Plan:     1.  Referral to physical therapy for chronic neck pain and headaches, recommending modalities to reduce pain and increased mobility of the neck and shoulder muscles.  2.  If physical therapy is of no benefit, a trial of muscle relaxants may also be offered.  3.  With the patient's prior history of mild renal insufficiency, NSAIDs are definitely contraindicated.  4.  Return to Neurology as needed.      This  was a 55 min visit with the patient and his daughter with over 50% of the time spent counseling the family regarding the presumed etiology of this headache and discussion of his stroke recovery.  This note is generated with speech recognition software and is subject to transcription error and sound alike phrases that may be missed by proofreading.

## 2019-08-05 ENCOUNTER — HOSPITAL ENCOUNTER (OUTPATIENT)
Dept: RADIOLOGY | Facility: HOSPITAL | Age: 79
Discharge: HOME | End: 2019-08-05
Attending: INTERNAL MEDICINE
Payer: MEDICARE

## 2019-08-05 ENCOUNTER — CLINICAL SUPPORT (OUTPATIENT)
Dept: PULMONOLOGY | Facility: CLINIC | Age: 79
End: 2019-08-05
Payer: MEDICARE

## 2019-08-05 ENCOUNTER — OFFICE VISIT (OUTPATIENT)
Dept: PULMONOLOGY | Facility: CLINIC | Age: 79
End: 2019-08-05
Payer: MEDICARE

## 2019-08-05 VITALS
RESPIRATION RATE: 16 BRPM | BODY MASS INDEX: 22.68 KG/M2 | DIASTOLIC BLOOD PRESSURE: 58 MMHG | WEIGHT: 123.25 LBS | SYSTOLIC BLOOD PRESSURE: 100 MMHG | OXYGEN SATURATION: 97 % | HEART RATE: 62 BPM | HEIGHT: 62 IN

## 2019-08-05 VITALS — WEIGHT: 123.25 LBS | HEIGHT: 62 IN | BODY MASS INDEX: 22.68 KG/M2

## 2019-08-05 DIAGNOSIS — J44.9 CHRONIC OBSTRUCTIVE PULMONARY DISEASE, UNSPECIFIED COPD TYPE: ICD-10-CM

## 2019-08-05 DIAGNOSIS — N40.1 BENIGN PROSTATIC HYPERPLASIA WITH URINARY FREQUENCY: ICD-10-CM

## 2019-08-05 DIAGNOSIS — R09.02 EXERCISE HYPOXEMIA: ICD-10-CM

## 2019-08-05 DIAGNOSIS — A15.0: ICD-10-CM

## 2019-08-05 DIAGNOSIS — J96.21 ACUTE ON CHRONIC RESPIRATORY FAILURE WITH HYPOXIA: ICD-10-CM

## 2019-08-05 DIAGNOSIS — J44.9 CHRONIC OBSTRUCTIVE PULMONARY DISEASE, UNSPECIFIED COPD TYPE: Primary | ICD-10-CM

## 2019-08-05 DIAGNOSIS — L29.9 ITCHING: ICD-10-CM

## 2019-08-05 DIAGNOSIS — R35.0 BENIGN PROSTATIC HYPERPLASIA WITH URINARY FREQUENCY: ICD-10-CM

## 2019-08-05 DIAGNOSIS — K21.9 GASTROESOPHAGEAL REFLUX DISEASE, ESOPHAGITIS PRESENCE NOT SPECIFIED: ICD-10-CM

## 2019-08-05 PROCEDURE — 99214 OFFICE O/P EST MOD 30 MIN: CPT | Mod: PBBFAC,25 | Performed by: INTERNAL MEDICINE

## 2019-08-05 PROCEDURE — 99999 PR PBB SHADOW E&M-EST. PATIENT-LVL IV: CPT | Mod: PBBFAC,,, | Performed by: INTERNAL MEDICINE

## 2019-08-05 PROCEDURE — 99215 OFFICE O/P EST HI 40 MIN: CPT | Mod: 25,S$PBB,, | Performed by: INTERNAL MEDICINE

## 2019-08-05 PROCEDURE — 99999 PR PBB SHADOW E&M-EST. PATIENT-LVL IV: ICD-10-PCS | Mod: PBBFAC,,, | Performed by: INTERNAL MEDICINE

## 2019-08-05 PROCEDURE — 94618 PULMONARY STRESS TESTING: CPT | Mod: 26,S$PBB,, | Performed by: INTERNAL MEDICINE

## 2019-08-05 PROCEDURE — 94618 PULMONARY STRESS TESTING: ICD-10-PCS | Mod: 26,S$PBB,, | Performed by: INTERNAL MEDICINE

## 2019-08-05 PROCEDURE — 71046 X-RAY EXAM CHEST 2 VIEWS: CPT | Mod: 26,,, | Performed by: RADIOLOGY

## 2019-08-05 PROCEDURE — 71046 X-RAY EXAM CHEST 2 VIEWS: CPT | Mod: TC

## 2019-08-05 PROCEDURE — 94618 PULMONARY STRESS TESTING: CPT | Mod: PBBFAC

## 2019-08-05 PROCEDURE — 71046 XR CHEST PA AND LATERAL: ICD-10-PCS | Mod: 26,,, | Performed by: RADIOLOGY

## 2019-08-05 PROCEDURE — 99215 PR OFFICE/OUTPT VISIT, EST, LEVL V, 40-54 MIN: ICD-10-PCS | Mod: 25,S$PBB,, | Performed by: INTERNAL MEDICINE

## 2019-08-05 RX ORDER — HYDROXYZINE PAMOATE 25 MG/1
25 CAPSULE ORAL EVERY 8 HOURS PRN
Qty: 30 CAPSULE | Refills: 11 | Status: SHIPPED | OUTPATIENT
Start: 2019-08-05

## 2019-08-05 RX ORDER — PANTOPRAZOLE SODIUM 40 MG/1
40 TABLET, DELAYED RELEASE ORAL DAILY
Qty: 30 TABLET | Refills: 11 | Status: SHIPPED | OUTPATIENT
Start: 2019-08-05 | End: 2019-08-19 | Stop reason: SDUPTHER

## 2019-08-05 RX ORDER — FLUTICASONE FUROATE AND VILANTEROL 200; 25 UG/1; UG/1
1 POWDER RESPIRATORY (INHALATION) DAILY
Qty: 30 EACH | Refills: 11 | Status: SHIPPED | OUTPATIENT
Start: 2019-08-05 | End: 2020-01-30

## 2019-08-05 RX ORDER — CARVEDILOL 6.25 MG/1
6.25 TABLET ORAL 2 TIMES DAILY WITH MEALS
Refills: 5 | COMMUNITY
Start: 2019-07-08 | End: 2020-01-27

## 2019-08-05 RX ORDER — DILTIAZEM HYDROCHLORIDE 120 MG/1
120 CAPSULE, COATED, EXTENDED RELEASE ORAL DAILY
Refills: 5 | COMMUNITY
Start: 2019-05-15 | End: 2020-01-27 | Stop reason: SDUPTHER

## 2019-08-05 RX ORDER — TAMSULOSIN HYDROCHLORIDE 0.4 MG/1
0.4 CAPSULE ORAL NIGHTLY
Qty: 30 CAPSULE | Refills: 11 | Status: SHIPPED | OUTPATIENT
Start: 2019-08-05 | End: 2019-09-06 | Stop reason: SDUPTHER

## 2019-08-05 RX ORDER — ALBUTEROL SULFATE 90 UG/1
2 AEROSOL, METERED RESPIRATORY (INHALATION) EVERY 4 HOURS PRN
Qty: 1 INHALER | Refills: 11 | Status: SHIPPED | OUTPATIENT
Start: 2019-08-05

## 2019-08-05 NOTE — PROCEDURES
"[facility]  Six Minute Walk     SUMMARY     Ordering Provider: Dr Iverson   Interpreting Provider: Dr Iverson  Performing nurse/tech/RT: ELZA Robledo RRT  Diagnosis: COPD  Height: 5' 1.5" (156.2 cm)  Weight: 55.9 kg (123 lb 3.8 oz)  BMI (Calculated): 23   Patient Race:             Phase Oxygen Assessment Supplemental O2 Heart   Rate Blood Pressure Rui Dyspnea Scale Rating   Resting 97 % Room Air 62 bpm 112/59 3   Exercise        Minute        1 96 % Room Air 73 bpm     2 95 % Room Air 78 bpm     3 95 % Room Air 75 bpm     4 94 % Room Air 77 bpm     5 94 % Room Air 75 bpm     6  95 % Room Air 76 bpm 116/57 5-6   Recovery        Minute        1 97 % Room Air 52 bpm     2 99 % Room Air 58 bpm     3 99 % Room Air 65 bpm     4 99 % Room Air 60 bpm 103/56 5-6     Six Minute Walk Summary  6MWT Status: completed without stopping  Patient Reported: Dyspnea(legs felt light they were going to give out)     Interpretation:  Did the patient stop or pause?: No                                         Total Time Walked (Calculated): 360 seconds  Final Partial Lap Distance (feet): 0 feet  Total Distance Meters (Calculated): 243.84 meters  Predicted Distance Meters (Calculated): 388.51 meters  Percentage of Predicted (Calculated): 62.76  Peak VO2 (Calculated): 11.3  Mets: 3.23  Has The Patient Had a Previous Six Minute Walk Test?: Yes       Previous 6MWT Results  Has The Patient Had a Previous Six Minute Walk Test?: Yes  Date of Previous Test: 01/28/19  Total Time Walked: 360 seconds  Total Distance (meters): 304.8  Predicted Distance (meters): 398.54 meters  Percentage of Predicted: 76.48  Percent Change (Calculated): 0.2      Interpretation:  Total distance walked in six minutes is mildly reduced indicating a reduction in overall  functional capacity. There was no exercise induced hypoxemia with significant oxygen desaturation.    Oxygen desaturation did not meet criteria for supplemental oxygen " prescription.    Clinical correlation suggested.    Davon Iverson MD    Mild exercise-induced hypoxemia described as an arterial oxygen saturation of 93-95% (or 3-4% less than at rest), moderate exercise-induced hypoxemia as 89-93%, and severe exercise induced hypoxemia as < 89% O2 saturation.  Medicare Criteria Comments:   When arterial oxygen saturation is at or below 88% during exercise (severe exercise induced hypoxemia) then the patient falls under Medicare Group 1 criteria for supplemental oxygen.  Details about Medicare Group Criteria coverage can be found at http://www.cms.hhs.gov/manuals/downloads/

## 2019-08-05 NOTE — PROGRESS NOTES
Subjective:       Patient ID: Baltazar Garcia is a 79 y.o. male.    Chief Complaint: He       COPD    HPI   COPD  He presents for evaluation and treatment of COPD. The patient is not currently have symptoms / an exacerbation. The patient has COPD for approximately 5 years. Symptoms in previous episodes have included cough, wheezing, increased sputum and colored sputum, and typically last 1 month. Previous episodes have been exacerbated by moderate activity. Current treatment includes albuterol nebulizer and inhaled steroid, which generally provides some relief of symptoms.   He uses 1 pillows at night. Patient currently is not on home oxygen therapy.. The patient is having no constitutional symptoms, denying fever, chills, anorexia, or weight loss. The patient has been hospitalized for this condition before. He quit smoking approximately 22 years ago. The patient is experiencing exercise intolerance (difficulty walking 2 blocks on flat ground). Hx of TB treated years ago.    history of Atrial Fibrillation   Pbroblems with balance  Occurs with trying to get up   Weak in legs  Feels like legs are going to give out    Dysphagia:  Two pharnygograms  One at [facility] - worked with speech therapy  The other at [facility]     Past Medical History:   Diagnosis Date    Asthma     Diabetes mellitus     pt/states no meds and no diabetes    Hypertension     Pneumonia     Tuberculosis 2002    Treated     Past Surgical History:   Procedure Laterality Date    FRACTURE SURGERY       Social History     Socioeconomic History    Marital status:      Spouse name: Not on file    Number of children: Not on file    Years of education: Not on file    Highest education level: Not on file   Occupational History    Not on file   Social Needs    Financial resource strain: Not on file    Food insecurity:     Worry: Not on file     Inability: Not on file    Transportation  "needs:     Medical: Not on file     Non-medical: Not on file   Tobacco Use    Smoking status: Former Smoker     Last attempt to quit:      Years since quittin.6    Smokeless tobacco: Never Used   Substance and Sexual Activity    Alcohol use: No    Drug use: No    Sexual activity: Never   Lifestyle    Physical activity:     Days per week: Not on file     Minutes per session: Not on file    Stress: Not on file   Relationships    Social connections:     Talks on phone: Not on file     Gets together: Not on file     Attends Tenriism service: Not on file     Active member of club or organization: Not on file     Attends meetings of clubs or organizations: Not on file     Relationship status: Not on file   Other Topics Concern    Not on file   Social History Narrative    Not on file     Review of Systems   Constitutional: Negative for fever and fatigue.   HENT: Positive for postnasal drip, rhinorrhea and trouble swallowing. Negative for congestion.    Eyes: Negative for redness and itching.   Respiratory: Positive for cough, shortness of breath, dyspnea on extertion, use of rescue inhaler and Paroxysmal Nocturnal Dyspnea. Negative for sputum production.    Cardiovascular: Negative for chest pain, palpitations and leg swelling.   Genitourinary: Positive for difficulty urinating. Negative for hematuria.   Endocrine: Negative for cold intolerance and heat intolerance.    Skin: Negative for rash.   Gastrointestinal: Positive for acid reflux. Negative for nausea and abdominal pain.   Neurological: Negative for dizziness, syncope, weakness and light-headedness.   Hematological: Negative for adenopathy. Does not bruise/bleed easily.   Psychiatric/Behavioral: Negative for sleep disturbance. The patient is not nervous/anxious.        Objective:      BP (!) 100/58   Pulse 62   Resp 16   Ht 5' 1.5" (1.562 m)   Wt 55.9 kg (123 lb 3.8 oz)   SpO2 97%   BMI 22.91 kg/m²   Physical Exam   Constitutional: He is " oriented to person, place, and time. He appears well-developed and well-nourished.   HENT:   Head: Normocephalic and atraumatic.   Eyes: Pupils are equal, round, and reactive to light. Conjunctivae are normal.   Neck: Neck supple. No JVD present. No tracheal deviation present. No thyromegaly present.   Cardiovascular: Normal rate. An irregularly irregular rhythm present.   No murmur heard.  Pulmonary/Chest: He has decreased breath sounds. He has wheezes in the right lower field and the left lower field. He has no rhonchi. He has rales.   Abdominal: Soft. Bowel sounds are normal.   Musculoskeletal: Normal range of motion. He exhibits no edema or tenderness.   Lymphadenopathy:     He has no cervical adenopathy.   Neurological: He is alert and oriented to person, place, and time.   Skin: Skin is warm and dry.   Nursing note and vitals reviewed.    Personal Diagnostic Review  Chest x-ray: Stable , right upper lobe scarring  X-Ray Chest PA And Lateral  Narrative: EXAMINATION:  XR CHEST PA AND LATERAL    CLINICAL HISTORY:  SOB; Chronic obstructive pulmonary disease, unspecified    TECHNIQUE:  PA and lateral views of the chest were performed.    COMPARISON:  02/18/2019    FINDINGS:  There is chronic pleuroparenchymal scarring noted within both lung apices right greater than the left with retraction of both emmanuel superiorly.  There is chronic blunting of the lateral left crossed phrenic angle.  No infiltrates or effusions are noted.  The heart is not enlarged.  Impression: Stable exam    Electronically signed by: Sammy Rapp DO  Date:    08/05/2019  Time:    10:42      Office Spirometry Results:     No flowsheet data found.  Pulmonary Studies Review 8/5/2019   SpO2 97   Ordering Provider -   Interpreting Provider -   Performing nurse/tech/RT -   Diagnosis -   Height 61.500   Weight 1971.79   BMI (Calculated) 23   Predicted Distance 323.59   Patient Race -   6MWT Status -   Patient Reported -   Was O2 used? -   6MW  Distance walked (feet) -   Distance walked (meters) -   Did patient stop? -   Type of assistive device(s) used? -   Is extra documentation required for this patient? -   Oxygen Saturation -   Supplemental Oxygen -   Heart Rate -   Blood Pressure -   Rui Dyspnea Rating  -   Oxygen Saturation -   Supplemental Oxygen -   Heart Rate -   Blood Pressure -   Rui Dyspnea Rating  -   Recovery Time (seconds) -   Oxygen Saturation -   Supplemental Oxygen -   Heart Rate -   Blood Pressure -   Rui Dyspnea Rating  -   Is procedure ready for interpretation? -   Did the patient stop or pause? -   Total Time Walked (Calculated) -   Total Laps Walked -   Final Partial Lap Distance (feet) -   Total Distance Feet (Calculated) -   Total Distance Meters (Calculated) -   Predicted Distance Meters (Calculated) 388.51   Percentage of Predicted (Calculated) -   Peak VO2 (Calculated) -   Mets -   Has The Patient Had a Previous Six Minute Walk Test? -   Oxygen Qualification? -         Assessment:       Chronic obstructive pulmonary disease, unspecified COPD type  -     fluticasone furoate-vilanterol (BREO ELLIPTA) 200-25 mcg/dose DsDv diskus inhaler; Inhale 1 puff into the lungs once daily.  Dispense: 30 each; Refill: 11  -     albuterol (PROVENTIL/VENTOLIN HFA) 90 mcg/actuation inhaler; Inhale 2 puffs into the lungs every 4 (four) hours as needed for Wheezing or Shortness of Breath.  Dispense: 1 Inhaler; Refill: 11  -     X-Ray Chest PA And Lateral; Future; Expected date: 08/05/2019    Benign prostatic hyperplasia with urinary frequency  -     tamsulosin (FLOMAX) 0.4 mg Cap; Take 1 capsule (0.4 mg total) by mouth every evening. Call PCP for refills  Dispense: 30 capsule; Refill: 11    Gastroesophageal reflux disease, esophagitis presence not specified  -     pantoprazole (PROTONIX) 40 MG tablet; Take 1 tablet (40 mg total) by mouth once daily.  Dispense: 30 tablet; Refill: 11    Itching  -     hydrOXYzine pamoate (VISTARIL) 25 MG Cap; Take  1 capsule (25 mg total) by mouth every 8 (eight) hours as needed (itching).  Dispense: 30 capsule; Refill: 11    TB bronchiectasis - [facility] at [facility]     Acute on chronic respiratory failure with hypoxia - resolved          Outpatient Encounter Medications as of 8/5/2019   Medication Sig Dispense Refill    albuterol (PROVENTIL) 2.5 mg /3 mL (0.083 %) nebulizer solution Take 3 mLs (2.5 mg total) by nebulization every 6 (six) hours as needed for Wheezing or Shortness of Breath. 360 mL 11    albuterol (PROVENTIL/VENTOLIN HFA) 90 mcg/actuation inhaler Inhale 2 puffs into the lungs every 4 (four) hours as needed for Wheezing or Shortness of Breath. 1 Inhaler 11    apixaban 2.5 mg Tab Take 1 tablet (2.5 mg total) by mouth 2 (two) times daily. 60 tablet 5    aspirin (ECOTRIN) 81 MG EC tablet Take 81 mg by mouth.      atorvastatin (LIPITOR) 40 MG tablet Take 40 mg by mouth nightly.  11    carvedilol (COREG) 6.25 MG tablet Take 6.25 mg by mouth 2 (two) times daily with meals.  5    ciprofloxacin HCl (CIPRO) 250 MG tablet       diltiaZEM (CARDIZEM CD) 120 MG Cp24 Take 120 mg by mouth once daily.  5    doxepin (SINEQUAN) 10 MG capsule Take 10 mg by mouth.      fluticasone furoate-vilanterol (BREO ELLIPTA) 200-25 mcg/dose DsDv diskus inhaler Inhale 1 puff into the lungs once daily. 30 each 11    polyethylene glycol (GLYCOLAX) 17 gram/dose powder       sodium chloride (ALTAMIST) 0.65 % nasal spray 1 spray by Nasal route every 4 (four) hours as needed.      [DISCONTINUED] albuterol (PROVENTIL/VENTOLIN HFA) 90 mcg/actuation inhaler Inhale 2 puffs into the lungs every 4 (four) hours as needed for Wheezing or Shortness of Breath. 1 Inhaler 11    [DISCONTINUED] BREO ELLIPTA 200-25 mcg/dose DsDv diskus inhaler Inhale 1 puff into the lungs once daily. 30 each 11    finasteride (PROSCAR) 5 mg tablet Take 1 tablet (5 mg total) by mouth once daily. Call PCP for refills 30 tablet 0    hydrOXYzine pamoate (VISTARIL)  25 MG Cap Take 1 capsule (25 mg total) by mouth every 8 (eight) hours as needed (itching). 30 capsule 11    pantoprazole (PROTONIX) 40 MG tablet Take 1 tablet (40 mg total) by mouth once daily. 30 tablet 11    tamsulosin (FLOMAX) 0.4 mg Cap Take 1 capsule (0.4 mg total) by mouth every evening. Call PCP for refills 30 capsule 11    [DISCONTINUED] pantoprazole (PROTONIX) 40 MG tablet Take 1 tablet (40 mg total) by mouth once daily. 30 tablet 0    [DISCONTINUED] tamsulosin (FLOMAX) 0.4 mg Cp24 Take 1 capsule (0.4 mg total) by mouth once daily. Call PCP for refills 30 capsule 0     No facility-administered encounter medications on file as of 8/5/2019.      Plan:       Requested Prescriptions     Signed Prescriptions Disp Refills    tamsulosin (FLOMAX) 0.4 mg Cap 30 capsule 11     Sig: Take 1 capsule (0.4 mg total) by mouth every evening. Call PCP for refills    fluticasone furoate-vilanterol (BREO ELLIPTA) 200-25 mcg/dose DsDv diskus inhaler 30 each 11     Sig: Inhale 1 puff into the lungs once daily.    albuterol (PROVENTIL/VENTOLIN HFA) 90 mcg/actuation inhaler 1 Inhaler 11     Sig: Inhale 2 puffs into the lungs every 4 (four) hours as needed for Wheezing or Shortness of Breath.    pantoprazole (PROTONIX) 40 MG tablet 30 tablet 11     Sig: Take 1 tablet (40 mg total) by mouth once daily.    hydrOXYzine pamoate (VISTARIL) 25 MG Cap 30 capsule 11     Sig: Take 1 capsule (25 mg total) by mouth every 8 (eight) hours as needed (itching).     Problem List Items Addressed This Visit     Acute on chronic respiratory failure with hypoxia    Overview     Last Assessment & Plan:   Patient presents with evidence of hypercarbic respiratory failure.  Patient satting well on 2 L nasal cannula which is roughly the patient's home dosing.  Sputum cultures are negative and blood cultures are negative.  Legionella pending negative mycoplasma negative flu screen negative respiratory viral panel negative.  On Cefepime and Levaquin  day 5.  Much improved. Sput cx normal jenifer + pansensitive Pseudomonas    Continue antibiotic therapy for 5 addn days  4/4: No growth to date.  Likely multifactorial because of patient's respiratory failure at this time.  4/6: Patient with no growth to date on blood cultures.   Patient doing remarkably well versus admission.  Satting 88%  on room air after working with PT.  Appr [facility] f/u, will DC on few days Prednisone  Spoke to his daughter Brisa Wade on phone, ready for him to come home, questions answered  8/5/2019 Resolved. 6 min walk - does not need oxygen         Chronic obstructive pulmonary disease - Primary    Relevant Medications    fluticasone furoate-vilanterol (BREO ELLIPTA) 200-25 mcg/dose DsDv diskus inhaler    albuterol (PROVENTIL/VENTOLIN HFA) 90 mcg/actuation inhaler    Other Relevant Orders    X-Ray Chest PA And Lateral    RESOLVED: TB bronchiectasis - treated at [facility]       Other Visit Diagnoses     Benign prostatic hyperplasia with urinary frequency        Relevant Medications    tamsulosin (FLOMAX) 0.4 mg Cap    Gastroesophageal reflux disease, esophagitis presence not specified        Relevant Medications    pantoprazole (PROTONIX) 40 MG tablet    Itching        Relevant Medications    hydrOXYzine pamoate (VISTARIL) 25 MG Cap             Follow up in about 6 months (around 2/5/2020) for Review CXR.    MEDICAL DECISION MAKING: Moderate to high complexity.  Overall, the multiple problems listed are of moderate to high severity that may impact quality of life and activities of daily living. Side effects of medications, treatment plan as well as options and alternatives reviewed and discussed with patient. There was counseling of patient concerning these issues.    Total time spent in face to face counseling and coordination of care - 40  minutes over 50% of time was used in discussion of prognosis, risks, benefits of treatment, instructions and compliance with regimen . Discussion with other  physicians or health care providers (DME, NP, pharmacy, respiratory therapy) occurred.

## 2019-08-19 ENCOUNTER — OFFICE VISIT (OUTPATIENT)
Dept: INTERNAL MEDICINE | Facility: CLINIC | Age: 79
End: 2019-08-19
Payer: MEDICARE

## 2019-08-19 ENCOUNTER — HOSPITAL ENCOUNTER (OUTPATIENT)
Dept: RADIOLOGY | Facility: HOSPITAL | Age: 79
Discharge: HOME | End: 2019-08-19
Attending: FAMILY MEDICINE
Payer: MEDICARE

## 2019-08-19 VITALS
DIASTOLIC BLOOD PRESSURE: 74 MMHG | BODY MASS INDEX: 22.76 KG/M2 | TEMPERATURE: 96 F | WEIGHT: 120.56 LBS | SYSTOLIC BLOOD PRESSURE: 116 MMHG | OXYGEN SATURATION: 98 % | HEART RATE: 87 BPM | HEIGHT: 61 IN

## 2019-08-19 DIAGNOSIS — K21.9 GASTROESOPHAGEAL REFLUX DISEASE, ESOPHAGITIS PRESENCE NOT SPECIFIED: ICD-10-CM

## 2019-08-19 DIAGNOSIS — K59.00 CONSTIPATION, UNSPECIFIED CONSTIPATION TYPE: ICD-10-CM

## 2019-08-19 DIAGNOSIS — J44.1 CHRONIC OBSTRUCTIVE PULMONARY DISEASE WITH ACUTE EXACERBATION: ICD-10-CM

## 2019-08-19 DIAGNOSIS — N18.30 STAGE 3 CHRONIC KIDNEY DISEASE: ICD-10-CM

## 2019-08-19 DIAGNOSIS — J44.1 COPD WITH ACUTE EXACERBATION: ICD-10-CM

## 2019-08-19 DIAGNOSIS — J18.9 PNEUMONIA OF LEFT LOWER LOBE DUE TO INFECTIOUS ORGANISM: Primary | ICD-10-CM

## 2019-08-19 PROBLEM — G62.9 NEUROPATHY: Status: ACTIVE | Noted: 2019-05-07

## 2019-08-19 PROBLEM — I63.81 BASAL GANGLIA STROKE: Status: ACTIVE | Noted: 2019-01-22

## 2019-08-19 PROBLEM — R51.9 HA (HEADACHE): Status: ACTIVE | Noted: 2019-03-26

## 2019-08-19 PROBLEM — B34.8 PARAINFLUENZA VIRUS INFECTION: Status: ACTIVE | Noted: 2019-04-19

## 2019-08-19 PROBLEM — Z86.11 HISTORY OF TUBERCULOSIS: Status: ACTIVE | Noted: 2019-04-20

## 2019-08-19 PROBLEM — R13.10 DYSPHAGIA: Status: ACTIVE | Noted: 2019-04-01

## 2019-08-19 PROBLEM — N40.0 BPH (BENIGN PROSTATIC HYPERPLASIA): Status: ACTIVE | Noted: 2019-05-07

## 2019-08-19 PROBLEM — R63.30 FEEDING DIFFICULTY: Status: ACTIVE | Noted: 2019-04-11

## 2019-08-19 PROCEDURE — 99214 OFFICE O/P EST MOD 30 MIN: CPT | Mod: PBBFAC,25 | Performed by: FAMILY MEDICINE

## 2019-08-19 PROCEDURE — 71046 X-RAY EXAM CHEST 2 VIEWS: CPT | Mod: 26,,, | Performed by: RADIOLOGY

## 2019-08-19 PROCEDURE — 99205 OFFICE O/P NEW HI 60 MIN: CPT | Mod: S$PBB,,, | Performed by: FAMILY MEDICINE

## 2019-08-19 PROCEDURE — 99999 PR PBB SHADOW E&M-EST. PATIENT-LVL IV: CPT | Mod: PBBFAC,,, | Performed by: FAMILY MEDICINE

## 2019-08-19 PROCEDURE — 71046 XR CHEST PA AND LATERAL: ICD-10-PCS | Mod: 26,,, | Performed by: RADIOLOGY

## 2019-08-19 PROCEDURE — 99999 PR PBB SHADOW E&M-EST. PATIENT-LVL IV: ICD-10-PCS | Mod: PBBFAC,,, | Performed by: FAMILY MEDICINE

## 2019-08-19 PROCEDURE — 96372 THER/PROPH/DIAG INJ SC/IM: CPT | Mod: PBBFAC

## 2019-08-19 PROCEDURE — 99205 PR OFFICE/OUTPT VISIT, NEW, LEVL V, 60-74 MIN: ICD-10-PCS | Mod: S$PBB,,, | Performed by: FAMILY MEDICINE

## 2019-08-19 PROCEDURE — 71046 X-RAY EXAM CHEST 2 VIEWS: CPT | Mod: TC

## 2019-08-19 PROCEDURE — 94640 AIRWAY INHALATION TREATMENT: CPT | Mod: PBBFAC,59

## 2019-08-19 RX ORDER — APIXABAN 5 MG/1
5 TABLET, FILM COATED ORAL 2 TIMES DAILY
Refills: 3 | COMMUNITY
Start: 2019-07-26 | End: 2019-10-31 | Stop reason: SDUPTHER

## 2019-08-19 RX ORDER — PANTOPRAZOLE SODIUM 40 MG/1
40 TABLET, DELAYED RELEASE ORAL DAILY
Qty: 30 TABLET | Refills: 11 | Status: SHIPPED | OUTPATIENT
Start: 2019-08-19 | End: 2020-09-08

## 2019-08-19 RX ORDER — PREDNISONE 20 MG/1
TABLET ORAL
Qty: 11 TABLET | Refills: 0 | Status: SHIPPED | OUTPATIENT
Start: 2019-08-19 | End: 2020-01-21 | Stop reason: ALTCHOICE

## 2019-08-19 RX ORDER — METHYLPREDNISOLONE ACETATE 40 MG/ML
40 INJECTION, SUSPENSION INTRA-ARTICULAR; INTRALESIONAL; INTRAMUSCULAR; SOFT TISSUE ONCE
Status: COMPLETED | OUTPATIENT
Start: 2019-08-19 | End: 2019-08-19

## 2019-08-19 RX ORDER — IPRATROPIUM BROMIDE AND ALBUTEROL SULFATE 2.5; .5 MG/3ML; MG/3ML
3 SOLUTION RESPIRATORY (INHALATION) ONCE
Status: COMPLETED | OUTPATIENT
Start: 2019-08-19 | End: 2019-08-19

## 2019-08-19 RX ADMIN — IPRATROPIUM BROMIDE AND ALBUTEROL SULFATE 3 ML: .5; 3 SOLUTION RESPIRATORY (INHALATION) at 09:08

## 2019-08-19 RX ADMIN — METHYLPREDNISOLONE ACETATE 40 MG: 40 INJECTION, SUSPENSION INTRA-ARTICULAR; INTRALESIONAL; INTRAMUSCULAR; SOFT TISSUE at 09:08

## 2019-08-19 NOTE — PROGRESS NOTES
Subjective:       Patient ID: Baltazar Garcia is a 79 y.o. male.    Chief Complaint: Establish Care    78 yo male here to establish care. He has multiple medical problems including COPD, HTN, CVA with residual dysphagia. He had the CVA in 1/2019 and hospitalization in 4/2019 with bilateral pneumonia due to pseudomonas species. He sees Dr. Iverson; wears oxygen at night and PRN. He sees Dr. Romano at [facility] for cardiology care with h/o afib. Recently had a nuclear stress testing 7/2019.     Dysphagia:  Two pharnygograms  One at the [facility] - worked with speech therapy  The other at [facility]     He has had worsening shortness of breath/COPD symptoms since his pulmonology visit in 8/5/19. He reports fits of coughing which are exhausting first thing in the morning. He becomes short of breath just going from sitting to standing, which is not his baseline. Daughter reports that he has been using oxygen more frequently during the day. He uses his nebulizer about 4 times per day with temporary relief. They report good compliance with inhalers and medications. Denies fever, chills, rash, change in bowel habits. Denies worsening of urinary frequency. His daughter reports that he has had difficulty swallowing post-CVA but this seems to have improved; she denies witnessing choking episodes. Recent pharyngogram normal. Continues to ambulate independently.          does not have any pertinent problems on file.  Past Medical History:   Diagnosis Date    Asthma     Diabetes mellitus     pt/states no meds and no diabetes    Hypertension     Pneumonia     Pneumonia due to Pseudomonas species 4/2/2018    Last Assessment & Plan:  Patient initially presented with suspicion of pneumonia however of note patient did not have growth from blood.  Sputum cultures with normal jenifer and pansenstive pseudomonas.  Restart viral panel was negative.  Patient's white blood cell count was elevated  however patient was in steroid therapy as well, now falling. CT scan noted. Appr [facility]MS f/u.  Clinically much improved,    Tuberculosis 2002    Treated     Past Surgical History:   Procedure Laterality Date    FRACTURE SURGERY       Family History   Family history unknown: Yes     Social History     Socioeconomic History    Marital status:      Spouse name: Not on file    Number of children: Not on file    Years of education: Not on file    Highest education level: Not on file   Occupational History    Not on file   Social Needs    Financial resource strain: Not on file    Food insecurity:     Worry: Not on file     Inability: Not on file    Transportation needs:     Medical: Not on file     Non-medical: Not on file   Tobacco Use    Smoking status: Former Smoker     Last attempt to quit:      Years since quittin.6    Smokeless tobacco: Never Used   Substance and Sexual Activity    Alcohol use: No    Drug use: No    Sexual activity: Never   Lifestyle    Physical activity:     Days per week: Not on file     Minutes per session: Not on file    Stress: Not on file   Relationships    Social connections:     Talks on phone: Not on file     Gets together: Not on file     Attends Orthodoxy service: Not on file     Active member of club or organization: Not on file     Attends meetings of clubs or organizations: Not on file     Relationship status: Not on file   Other Topics Concern    Not on file   Social History Narrative    Not on file     Review of Systems   Constitutional: Negative for activity change and unexpected weight change.   HENT: Negative for hearing loss, rhinorrhea and trouble swallowing.    Eyes: Negative for discharge and visual disturbance.   Respiratory: Positive for cough, chest tightness and shortness of breath. Negative for choking and wheezing.    Cardiovascular: Positive for palpitations. Negative for chest pain.   Gastrointestinal: Positive for constipation.  Negative for blood in stool, diarrhea and vomiting.   Endocrine: Negative for polydipsia and polyuria.   Genitourinary: Positive for difficulty urinating. Negative for hematuria and urgency.   Musculoskeletal: Positive for neck pain. Negative for arthralgias and joint swelling.   Neurological: Positive for weakness and headaches.   Psychiatric/Behavioral: Negative for confusion and dysphoric mood.       Objective:     Vitals:    08/19/19 0824   BP: 116/74   Pulse: 87   Temp: 96.1 °F (35.6 °C)        Physical Exam   Constitutional: He is oriented to person, place, and time. He appears well-developed and well-nourished. No distress (calm).   HENT:   Head: Normocephalic and atraumatic.   Eyes: Pupils are equal, round, and reactive to light. EOM are normal. Right eye exhibits no discharge. Left eye exhibits no discharge.   Neck: Normal range of motion. Neck supple.   Cardiovascular: Normal rate, regular rhythm, normal heart sounds and intact distal pulses.   No murmur heard.  Pulmonary/Chest: No stridor. He is in respiratory distress. He has wheezes. He has rales. He exhibits no tenderness.   Breathing labored at rest; worsens with standing to sitting   Abdominal: Soft. Bowel sounds are normal. There is no tenderness.   Musculoskeletal: Normal range of motion. He exhibits no tenderness or deformity.   sarcopenia noted BLE   Neurological: He is alert and oriented to person, place, and time. He exhibits normal muscle tone.   Generalized weakness; slow sit to stand; ambulates independently but slowly due to dyspnea primarily   Skin: Skin is warm and dry. Capillary refill takes less than 2 seconds. No rash noted. He is not diaphoretic. No erythema. No pallor.   Psychiatric: He has a normal mood and affect. His behavior is normal. Judgment and thought content normal.   Nursing note and vitals reviewed.      Assessment:       1. Pneumonia of left lower lobe due to infectious organism    2. Chronic obstructive pulmonary  disease with acute exacerbation    3. COPD with acute exacerbation    4. Stage 3 chronic kidney disease    5. Gastroesophageal reflux disease, esophagitis presence not specified    6. Constipation, unspecified constipation type        Plan:           Problem List Items Addressed This Visit        Pulmonary    Chronic obstructive pulmonary disease    COPD with acute exacerbation    Overview     Chest xray today; will prescribe course of levaquin and steroid taper         Relevant Medications    predniSONE (DELTASONE) 20 MG tablet    albuterol-ipratropium 2.5 mg-0.5 mg/3 mL nebulizer solution 3 mL (Completed)    methylPREDNISolone acetate injection 40 mg (Completed)    Other Relevant Orders    X-Ray Chest PA And Lateral (Completed)    Comprehensive metabolic panel (Completed)    CBC auto differential (Completed)    Pneumonia of left lower lobe due to infectious organism - Primary    Overview     Hospitalized with pneumonia 4/2019; now with recurrent pneumonia 8/2019; had pharyngogram at last hospitalization         Current Assessment & Plan     Antibiotics initiated; hospital precautions given         Relevant Medications    amoxicillin-clavulanate 1,000-62.5 mg (AUGMENTIN XR) 1,000-62.5 mg per tablet    clarithromycin (BIAXIN) 500 MG tablet       Renal/    Stage 3 chronic kidney disease    Overview     Last Assessment & Plan:   History & Physical   Creatinine 1.43, which is mildly elevated over previous.  Will give gentle IV fluids and monitor respiratory status closely.  Previous echo during last hospitalization on 4/19/2019 shows an ejection fraction of 55 to 65% with PA P of 36 with mild to moderate MR.  Discharge Summary  Stable. Off IV fluids, monitor urine output. Continue monitoring renal function  Follow-up Stable. Off IV fluids, monitor urine output. Continue monitoring renal function         Relevant Orders    Comprehensive metabolic panel (Completed)       GI    Constipation    Current Assessment & Plan      Advised to try daily miralax and fiber supplement.           Other Visit Diagnoses     Gastroesophageal reflux disease, esophagitis presence not specified        Relevant Medications    pantoprazole (PROTONIX) 40 MG tablet

## 2019-08-19 NOTE — PATIENT INSTRUCTIONS
Shingles vaccine is now due and is available at any retail pharmacy without a prescription.     Schedule a visit with Dr. Roamno to go over his medications. Keep a blood pressure log in advance.     Miralax is available over the counter and is a great way to treat constipation. You can also add a fiber supplement like Metamucil.       Advance Medical Directive    An advance medical directive is a form that lets you plan ahead for the care youd want if you could no longer express your wishes. This statement outlines the medical treatment youd want or names the person youd wish to make healthcare decisions for you. Be aware that laws vary from state to state, and it may be worthwhile to talk with an .  Writing down your wishes  · An advance directive is important whether youre young or old. Injury or illness can strike at any age.  · Decide what is important to you and the kind of treatment youd want, or not want to have.  · Some states allow only one kind of advance directive. Some let you do both a Durable Power of  for Health Care and a Living Will. Some states put both kinds on the same form.  A Durable Power of  for Health Care  · This form lets you name someone else to be your agent.  · This person can decide on treatment for you only when you cant speak for yourself.  · You do not need to be at the end of your life. He or she could speak for you if you were in a coma but were likely to recover.  A Living Will  · This form lets you list the care you want at the end of your life.  · A living will applies only if you wont live without medical treatment. It would apply if you had advanced cancer, a massive stroke, or other serious illness from which you will not recover.  · It takes effect only when you can no longer express your wishes yourself.  Date Last Reviewed: 2/13/2016  © 4598-6502 [employer]. [address] [place] [address], [place] [address]. All rights reserved. This  information is not intended as a substitute for professional medical care. Always follow your healthcare professional's instructions.

## 2019-08-20 DIAGNOSIS — J18.9 PNEUMONIA OF LEFT LOWER LOBE DUE TO INFECTIOUS ORGANISM: Primary | ICD-10-CM

## 2019-08-20 PROBLEM — J15.1 PNEUMONIA DUE TO PSEUDOMONAS SPECIES: Status: RESOLVED | Noted: 2018-04-02 | Resolved: 2019-08-20

## 2019-08-20 PROBLEM — B34.8 PARAINFLUENZA VIRUS INFECTION: Status: RESOLVED | Noted: 2019-04-19 | Resolved: 2019-08-20

## 2019-08-20 RX ORDER — AMOXICILLIN AND CLAVULANATE POTASSIUM 562.5; 437.5; 62.5 MG/1; MG/1; MG/1
2 TABLET, FILM COATED, EXTENDED RELEASE ORAL 2 TIMES DAILY
Qty: 5 TABLET | Refills: 0 | Status: SHIPPED | OUTPATIENT
Start: 2019-08-20 | End: 2019-08-28 | Stop reason: SDUPTHER

## 2019-08-20 RX ORDER — CLARITHROMYCIN 500 MG/1
500 TABLET, FILM COATED ORAL EVERY 12 HOURS
Qty: 10 TABLET | Refills: 0 | Status: SHIPPED | OUTPATIENT
Start: 2019-08-20 | End: 2019-08-28 | Stop reason: SDUPTHER

## 2019-08-23 ENCOUNTER — PATIENT MESSAGE (OUTPATIENT)
Dept: INTERNAL MEDICINE | Facility: CLINIC | Age: 79
End: 2019-08-23

## 2019-08-26 ENCOUNTER — PATIENT MESSAGE (OUTPATIENT)
Dept: INTERNAL MEDICINE | Facility: CLINIC | Age: 79
End: 2019-08-26

## 2019-08-28 RX ORDER — CLARITHROMYCIN 500 MG/1
500 TABLET, FILM COATED ORAL EVERY 12 HOURS
Qty: 10 TABLET | Refills: 0 | Status: SHIPPED | OUTPATIENT
Start: 2019-08-28 | End: 2019-09-02

## 2019-08-28 RX ORDER — AMOXICILLIN AND CLAVULANATE POTASSIUM 562.5; 437.5; 62.5 MG/1; MG/1; MG/1
2 TABLET, FILM COATED, EXTENDED RELEASE ORAL 2 TIMES DAILY
Qty: 20 TABLET | Refills: 0 | Status: SHIPPED | OUTPATIENT
Start: 2019-08-28 | End: 2019-09-02

## 2019-09-03 ENCOUNTER — HOSPITAL ENCOUNTER (OUTPATIENT)
Dept: RADIOLOGY | Facility: HOSPITAL | Age: 79
Discharge: HOME | End: 2019-09-03
Attending: FAMILY MEDICINE
Payer: MEDICARE

## 2019-09-03 DIAGNOSIS — J18.9 PNEUMONIA OF LEFT LOWER LOBE DUE TO INFECTIOUS ORGANISM: ICD-10-CM

## 2019-09-03 PROCEDURE — 71046 X-RAY EXAM CHEST 2 VIEWS: CPT | Mod: TC

## 2019-09-03 PROCEDURE — 71046 X-RAY EXAM CHEST 2 VIEWS: CPT | Mod: 26,,, | Performed by: RADIOLOGY

## 2019-09-03 PROCEDURE — 71046 XR CHEST PA AND LATERAL: ICD-10-PCS | Mod: 26,,, | Performed by: RADIOLOGY

## 2019-09-06 DIAGNOSIS — R35.0 BENIGN PROSTATIC HYPERPLASIA WITH URINARY FREQUENCY: ICD-10-CM

## 2019-09-06 DIAGNOSIS — N40.1 BENIGN PROSTATIC HYPERPLASIA WITH URINARY FREQUENCY: ICD-10-CM

## 2019-09-06 RX ORDER — TAMSULOSIN HYDROCHLORIDE 0.4 MG/1
0.4 CAPSULE ORAL NIGHTLY
Qty: 30 CAPSULE | Refills: 11 | Status: SHIPPED | OUTPATIENT
Start: 2019-09-06 | End: 2020-09-05

## 2019-10-30 ENCOUNTER — PATIENT MESSAGE (OUTPATIENT)
Dept: INTERNAL MEDICINE | Facility: CLINIC | Age: 79
End: 2019-10-30

## 2019-10-31 RX ORDER — APIXABAN 5 MG/1
5 TABLET, FILM COATED ORAL 2 TIMES DAILY
Qty: 30 TABLET | Refills: 3 | Status: SHIPPED | OUTPATIENT
Start: 2019-10-31 | End: 2020-03-04 | Stop reason: SDUPTHER

## 2019-11-04 ENCOUNTER — IMMUNIZATION (OUTPATIENT)
Dept: INTERNAL MEDICINE | Facility: CLINIC | Age: 79
End: 2019-11-04
Payer: MEDICARE

## 2019-11-04 PROCEDURE — 99999 PR PBB SHADOW E&M-EST. PATIENT-LVL II: ICD-10-PCS | Mod: PBBFAC,,,

## 2019-11-04 PROCEDURE — 90662 IIV NO PRSV INCREASED AG IM: CPT | Mod: PBBFAC

## 2019-11-04 PROCEDURE — 99212 OFFICE O/P EST SF 10 MIN: CPT | Mod: PBBFAC,25

## 2019-11-04 PROCEDURE — 99999 PR PBB SHADOW E&M-EST. PATIENT-LVL II: CPT | Mod: PBBFAC,,,

## 2020-01-21 ENCOUNTER — OFFICE VISIT (OUTPATIENT)
Dept: PULMONOLOGY | Facility: CLINIC | Age: 80
End: 2020-01-21
Payer: MEDICARE

## 2020-01-21 VITALS
HEIGHT: 61 IN | RESPIRATION RATE: 15 BRPM | HEART RATE: 97 BPM | OXYGEN SATURATION: 97 % | BODY MASS INDEX: 22.68 KG/M2 | WEIGHT: 120.13 LBS | SYSTOLIC BLOOD PRESSURE: 120 MMHG | DIASTOLIC BLOOD PRESSURE: 72 MMHG

## 2020-01-21 DIAGNOSIS — J44.1 COPD EXACERBATION: ICD-10-CM

## 2020-01-21 DIAGNOSIS — J44.9 CHRONIC OBSTRUCTIVE PULMONARY DISEASE, UNSPECIFIED COPD TYPE: Primary | ICD-10-CM

## 2020-01-21 PROCEDURE — 99999 PR PBB SHADOW E&M-EST. PATIENT-LVL IV: ICD-10-PCS | Mod: PBBFAC,,, | Performed by: INTERNAL MEDICINE

## 2020-01-21 PROCEDURE — 99215 OFFICE O/P EST HI 40 MIN: CPT | Mod: S$PBB,,, | Performed by: INTERNAL MEDICINE

## 2020-01-21 PROCEDURE — 1159F PR MEDICATION LIST DOCUMENTED IN MEDICAL RECORD: ICD-10-PCS | Mod: ,,, | Performed by: INTERNAL MEDICINE

## 2020-01-21 PROCEDURE — 99214 OFFICE O/P EST MOD 30 MIN: CPT | Mod: PBBFAC | Performed by: INTERNAL MEDICINE

## 2020-01-21 PROCEDURE — 99215 PR OFFICE/OUTPT VISIT, EST, LEVL V, 40-54 MIN: ICD-10-PCS | Mod: S$PBB,,, | Performed by: INTERNAL MEDICINE

## 2020-01-21 PROCEDURE — 1159F MED LIST DOCD IN RCRD: CPT | Mod: ,,, | Performed by: INTERNAL MEDICINE

## 2020-01-21 PROCEDURE — 99999 PR PBB SHADOW E&M-EST. PATIENT-LVL IV: CPT | Mod: PBBFAC,,, | Performed by: INTERNAL MEDICINE

## 2020-01-21 RX ORDER — IPRATROPIUM BROMIDE AND ALBUTEROL SULFATE 2.5; .5 MG/3ML; MG/3ML
3 SOLUTION RESPIRATORY (INHALATION) EVERY 4 HOURS PRN
Qty: 120 VIAL | Refills: 11 | Status: SHIPPED | OUTPATIENT
Start: 2020-01-21 | End: 2020-02-12

## 2020-01-21 RX ORDER — PREDNISONE 5 MG/1
5 TABLET ORAL DAILY
Qty: 30 TABLET | Refills: 2 | Status: SHIPPED | OUTPATIENT
Start: 2020-01-21

## 2020-01-21 RX ORDER — PREDNISONE 20 MG/1
TABLET ORAL
Qty: 20 TABLET | Refills: 0 | Status: SHIPPED | OUTPATIENT
Start: 2020-01-21 | End: 2020-03-04 | Stop reason: SDUPTHER

## 2020-01-21 RX ORDER — IPRATROPIUM BROMIDE AND ALBUTEROL SULFATE 2.5; .5 MG/3ML; MG/3ML
3 SOLUTION RESPIRATORY (INHALATION) EVERY 4 HOURS PRN
COMMUNITY
Start: 2020-01-07 | End: 2020-01-21

## 2020-01-21 RX ORDER — SULFAMETHOXAZOLE AND TRIMETHOPRIM 800; 160 MG/1; MG/1
1 TABLET ORAL 2 TIMES DAILY
Qty: 60 TABLET | Refills: 0 | Status: SHIPPED | OUTPATIENT
Start: 2020-01-21

## 2020-01-21 RX ORDER — OMEPRAZOLE 20 MG/1
20 CAPSULE, DELAYED RELEASE ORAL DAILY PRN
COMMUNITY

## 2020-01-21 NOTE — PROGRESS NOTES
Subjective:       Patient ID: Baltazar Garcia is a 80 y.o. male.    Chief Complaint:   He   presents for evaluation and treatment of exacerbation of chronic obstructive pulmonary disease and bronchiectesis after being discharged from the hospital  2  weeks ago. Since discharge symptoms have gradually improving course since that time. Patient denies fever or chills. Symptoms are aggravated by activity. Symptoms improve with rest.  Respiratory: positive for cough, dyspnea on exertion, sputum and wheezing; Cardiovascular: no chest pain or palpitations.  Patient currently is on oxygen at 2 L/min per nasal cannula..- at night  MEDICAL RECORDS FROM THE HOSPITAL REVIEWED:from [facility]   Chest without Contrast1/4/2020  [facility]  Result Impression     1. COPD change with diffuse chronic interstitial disease and pleural parenchymal scarring as well as some bronchiectatic change. Overall there is some progression of disease with new findings or worsening of findings in lungs, as above. No definite acute airspace disease. Superimposed malignancy within any of these regions would be difficult to exclude. As clinically indicated, serial follow-up imaging or PET-CT could be performed for further evaluation.    2. Tiny left pleural effusion is now present which may partially loculated.    3. Again, ectatic aorta measuring up to 3.9 cm in ascending region.    4. Again, pulmonary arterial hypertensive change.   Result Narrative   HISTORY:      Shortness of breath, Pneumonia, MAC, previous TB, compare to films from April 2019.    EXAM:     CT CHEST WO CONTRAST.    COMPARISON:     April 22, 2019     FINDINGS:     Automated exposure control was used for dose reduction.    Evaluation of the chest includes the visualized lower neck structures, heart/ mediastinum/ emmanuel, pleura, lung parenchyma, upper abdominal structures as well as  the surrounding osseous and soft tissues.    *  Again, COPD change with diffuse pleural-parenchymal scarring. Persistent volume loss left hemithorax which is somewhat increased over previous. Pleural parenchymal densities are also somewhat increased in portions, particularly in right lung base laterally and throughout left mid and lower lung. New bandlike to nodular densities within the right lower lobe ventrally laterally measuring up to 1.8 x 1.2 cm and 1.2 x 0.5 cm.  *  Bilateral bronchiectatic changes are again seen with cystic bronchiectatic change particularly in the right apex dorsally medially and left mid thorax dorsally medially. Prior study showed some patchy alveolar opacities in the mid and lower lung which were not present on current study.  *  Persistent pleural thickening and scarring left mid and lower thorax with now tiny pleural effusion which could be partially loculated.  *  Again, multiple prominent to borderline enlarged mediastinal nodes. Prominent additional hilar nodes.  *  Again, ectatic aorta with maximal diameter ascending aorta 3.9 cm. Again mild calcification aortic arch.   *  Again, enlargement of pulmonary trunk and main and central pulmonary arteries.  *  Moderate stool within ascending and transverse colon.         COPD    HPI  Past Medical History:   Diagnosis Date    Asthma     Diabetes mellitus     pt/states no meds and no diabetes    Hypertension     Pneumonia     Pneumonia due to Pseudomonas species 4/2/2018    Last Assessment & Plan:  Patient initially presented with suspicion of pneumonia however of note patient did not have growth from blood.  Sputum cultures with normal jenifer and pansenstive pseudomonas.  Restart viral panel was negative.  Patient's white blood cell count was elevated however patient was in steroid therapy as well, now falling. CT scan noted. Appr CCMS f/u.  Clinically much improved,    Tuberculosis 2002    Treated     Past Surgical History:    Procedure Laterality Date    FRACTURE SURGERY       Social History     Socioeconomic History    Marital status:      Spouse name: Not on file    Number of children: Not on file    Years of education: Not on file    Highest education level: Not on file   Occupational History    Not on file   Social Needs    Financial resource strain: Not on file    Food insecurity:     Worry: Not on file     Inability: Not on file    Transportation needs:     Medical: Not on file     Non-medical: Not on file   Tobacco Use    Smoking status: Former Smoker     Last attempt to quit:      Years since quittin.0    Smokeless tobacco: Never Used   Substance and Sexual Activity    Alcohol use: No    Drug use: No    Sexual activity: Never   Lifestyle    Physical activity:     Days per week: Not on file     Minutes per session: Not on file    Stress: Not on file   Relationships    Social connections:     Talks on phone: Not on file     Gets together: Not on file     Attends Voodoo service: Not on file     Active member of club or organization: Not on file     Attends meetings of clubs or organizations: Not on file     Relationship status: Not on file   Other Topics Concern    Not on file   Social History Narrative    Not on file     Review of Systems   Constitutional: Positive for weight loss, fatigue and night sweats. Negative for fever.   HENT: Positive for postnasal drip, rhinorrhea and congestion.    Eyes: Negative for redness and itching.   Respiratory: Positive for cough, sputum production, shortness of breath, dyspnea on extertion, use of rescue inhaler and Paroxysmal Nocturnal Dyspnea.    Cardiovascular: Negative for chest pain, palpitations and leg swelling.   Genitourinary: Negative for difficulty urinating and hematuria.   Endocrine: Negative for cold intolerance and heat intolerance.    Musculoskeletal: Positive for arthralgias and back pain.   Skin: Negative for rash.   Gastrointestinal:  Negative for nausea and abdominal pain.   Neurological: Negative for syncope, weakness and light-headedness.   Hematological: Negative for adenopathy. Does not bruise/bleed easily.   Psychiatric/Behavioral: Positive for sleep disturbance. The patient is nervous/anxious.        Objective:      Physical Exam   Constitutional: He is oriented to person, place, and time. He appears well-developed and well-nourished.   HENT:   Head: Normocephalic and atraumatic.   Mouth/Throat: Oropharyngeal exudate present.   Eyes: Pupils are equal, round, and reactive to light. Conjunctivae are normal.   Neck: Neck supple. No JVD present. No tracheal deviation present. No thyromegaly present.   Cardiovascular: Normal rate, regular rhythm and normal heart sounds.   No murmur heard.  Pulmonary/Chest: Effort normal. He has decreased breath sounds. He has wheezes in the right lower field and the left lower field. He has no rhonchi. He has no rales.   Abdominal: Soft. Bowel sounds are normal.   Musculoskeletal: Normal range of motion. He exhibits no edema or tenderness.   Lymphadenopathy:     He has no cervical adenopathy.   Neurological: He is alert and oriented to person, place, and time.   Skin: Skin is warm and dry.   Nursing note and vitals reviewed.    Personal Diagnostic Review  Chest x-ray: no recent\    .[email or web address]  No flowsheet data found.        Assessment:       1. Chronic obstructive pulmonary disease, unspecified COPD type    2. COPD exacerbation        Outpatient Encounter Medications as of 1/21/2020   Medication Sig Dispense Refill    albuterol (PROVENTIL/VENTOLIN HFA) 90 mcg/actuation inhaler Inhale 2 puffs into the lungs every 4 (four) hours as needed for Wheezing or Shortness of Breath. 1 Inhaler 11    albuterol-ipratropium (DUO-NEB) 2.5 mg-0.5 mg/3 mL nebulizer solution Take 3 mLs by nebulization every 4 (four) hours as needed for Wheezing. 120 vial 11    aspirin (ECOTRIN) 81 MG EC tablet Take 81 mg by mouth.       atorvastatin (LIPITOR) 40 MG tablet Take 40 mg by mouth nightly.  11    carvedilol (COREG) 6.25 MG tablet Take 6.25 mg by mouth 2 (two) times daily with meals.  5    dextromethorphan-guaifenesin  mg (MUCINEX DM)  mg per 12 hr tablet Take 1 tablet by mouth.      diltiaZEM (CARDIZEM CD) 120 MG Cp24 Take 120 mg by mouth once daily.  5    doxepin (SINEQUAN) 10 MG capsule Take 10 mg by mouth.      ELIQUIS 5 mg Tab Take 1 tablet (5 mg total) by mouth 2 (two) times daily. 30 tablet 3    fluticasone furoate-vilanterol (BREO ELLIPTA) 200-25 mcg/dose DsDv diskus inhaler Inhale 1 puff into the lungs once daily. 30 each 11    hydrOXYzine pamoate (VISTARIL) 25 MG Cap Take 1 capsule (25 mg total) by mouth every 8 (eight) hours as needed (itching). 30 capsule 11    omeprazole (PRILOSEC) 20 MG capsule Take 20 mg by mouth daily as needed.      polyethylene glycol (GLYCOLAX) 17 gram/dose powder       sodium chloride (ALTAMIST) 0.65 % nasal spray 1 spray by Nasal route every 4 (four) hours as needed.      tamsulosin (FLOMAX) 0.4 mg Cap Take 1 capsule (0.4 mg total) by mouth every evening. Call PCP for refills 30 capsule 11    [DISCONTINUED] albuterol (PROVENTIL) 2.5 mg /3 mL (0.083 %) nebulizer solution Take 3 mLs (2.5 mg total) by nebulization every 6 (six) hours as needed for Wheezing or Shortness of Breath. 360 mL 11    [DISCONTINUED] albuterol-ipratropium (DUO-NEB) 2.5 mg-0.5 mg/3 mL nebulizer solution Inhale 3 mLs into the lungs every 4 (four) hours as needed.      [DISCONTINUED] ciprofloxacin HCl (CIPRO) 250 MG tablet       [DISCONTINUED] predniSONE (DELTASONE) 20 MG tablet 2 PO daily x 3 days then 1 po daily x 3 days then 1/2 po daily x 3 days then stop 11 tablet 0    finasteride (PROSCAR) 5 mg tablet Take 1 tablet (5 mg total) by mouth once daily. Call PCP for refills 30 tablet 0    pantoprazole (PROTONIX) 40 MG tablet Take 1 tablet (40 mg total) by mouth once daily. 30 tablet 11    predniSONE  "(DELTASONE) 20 MG tablet 60 mg/ day for 3 days, 40 mg/day for 3 days,20 mg/ day for 3 days, (1/2 tablet )10 mg a day for 3 days. 20 tablet 0    predniSONE (DELTASONE) 5 MG tablet Take 1 tablet (5 mg total) by mouth once daily. Start after high dose prednisone 30 tablet 2    sulfamethoxazole-trimethoprim 800-160mg (BACTRIM DS) 800-160 mg Tab Take 1 tablet by mouth 2 (two) times daily. 60 tablet 0     No facility-administered encounter medications on file as of 2020.      Orders Placed This Encounter   Procedures    NEBULIZER KIT (SUPPLIES) FOR HOME USE     Order Specific Question:   Height:     Answer:   5' 1" (1.549 m)     Order Specific Question:   Weight:     Answer:   54.5 kg (120 lb 2.4 oz)     Order Specific Question:   Length of need (1-99 months):     Answer:   99     Order Specific Question:   Mask or Mouthpiece?     Answer:   Mask    X-Ray Chest PA And Lateral     Standing Status:   Future     Standing Expiration Date:   2021     Order Specific Question:   Reason for Exam:     Answer:   SOB     Plan:       Requested Prescriptions     Signed Prescriptions Disp Refills    sulfamethoxazole-trimethoprim 800-160mg (BACTRIM DS) 800-160 mg Tab 60 tablet 0     Sig: Take 1 tablet by mouth 2 (two) times daily.    predniSONE (DELTASONE) 20 MG tablet 20 tablet 0     Si mg/ day for 3 days, 40 mg/day for 3 days,20 mg/ day for 3 days, (1/2 tablet )10 mg a day for 3 days.    predniSONE (DELTASONE) 5 MG tablet 30 tablet 2     Sig: Take 1 tablet (5 mg total) by mouth once daily. Start after high dose prednisone    albuterol-ipratropium (DUO-NEB) 2.5 mg-0.5 mg/3 mL nebulizer solution 120 vial 11     Sig: Take 3 mLs by nebulization every 4 (four) hours as needed for Wheezing.     Chronic obstructive pulmonary disease, unspecified COPD type  -     sulfamethoxazole-trimethoprim 800-160mg (BACTRIM DS) 800-160 mg Tab; Take 1 tablet by mouth 2 (two) times daily.  Dispense: 60 tablet; Refill: 0  -     " predniSONE (DELTASONE) 20 MG tablet; 60 mg/ day for 3 days, 40 mg/day for 3 days,20 mg/ day for 3 days, (1/2 tablet )10 mg a day for 3 days.  Dispense: 20 tablet; Refill: 0  -     predniSONE (DELTASONE) 5 MG tablet; Take 1 tablet (5 mg total) by mouth once daily. Start after high dose prednisone  Dispense: 30 tablet; Refill: 2  -     albuterol-ipratropium (DUO-NEB) 2.5 mg-0.5 mg/3 mL nebulizer solution; Take 3 mLs by nebulization every 4 (four) hours as needed for Wheezing.  Dispense: 120 vial; Refill: 11  -     NEBULIZER KIT (SUPPLIES) FOR HOME USE  -     X-Ray Chest PA And Lateral; Future; Expected date: 01/21/2020    COPD exacerbation  -     sulfamethoxazole-trimethoprim 800-160mg (BACTRIM DS) 800-160 mg Tab; Take 1 tablet by mouth 2 (two) times daily.  Dispense: 60 tablet; Refill: 0  -     predniSONE (DELTASONE) 20 MG tablet; 60 mg/ day for 3 days, 40 mg/day for 3 days,20 mg/ day for 3 days, (1/2 tablet )10 mg a day for 3 days.  Dispense: 20 tablet; Refill: 0  -     NEBULIZER KIT (SUPPLIES) FOR HOME USE           Follow up in about 6 weeks (around 3/3/2020) for Review CXR, Review progress.    Review of medical records from hospital. Medical records from hospital were reviewed during office visit - these included but were not limited to review of radiographic studies and /or radiologists reports, laboratory studies, discharge summaries, procedure notes, consultations and other transcribed notes.    MEDICAL DECISION MAKING: Moderate to high complexity.  Overall, the multiple problems listed are of moderate to high severity that may impact quality of life and activities of daily living. Side effects of medications, treatment plan as well as options and alternatives reviewed and discussed with patient. There was counseling of patient concerning these issues.    Total time spent in face to face counseling and coordination of care - 40 minutes over 50% of time was used in discussion of prognosis, risks, benefits of  treatment, instructions and compliance with regimen . Discussion with other physicians or health care providers (homehealth, durable medical equipment providers).

## 2020-01-21 NOTE — PATIENT INSTRUCTIONS
Sputum Culture & Gram StainResulted: 1/8/2020 8:01 AM  [facility]  Component Name Value Ref Range   Culture Sputum Moderate Normal Respiratory Elisha     Culture Sputum Few Possible Pseudomonas sp. (A)   Comment: No further workup performed.     Stain Many WBC/lpf     Stain 0-9 Epithelial cells per low power field     Stain Mixed Morphotypes present     Specimen Collected

## 2020-01-27 ENCOUNTER — OFFICE VISIT (OUTPATIENT)
Dept: INTERNAL MEDICINE | Facility: CLINIC | Age: 80
End: 2020-01-27
Payer: MEDICARE

## 2020-01-27 VITALS
HEIGHT: 61 IN | SYSTOLIC BLOOD PRESSURE: 108 MMHG | TEMPERATURE: 98 F | OXYGEN SATURATION: 98 % | BODY MASS INDEX: 22.73 KG/M2 | DIASTOLIC BLOOD PRESSURE: 78 MMHG | HEART RATE: 90 BPM | WEIGHT: 120.38 LBS

## 2020-01-27 DIAGNOSIS — I48.92 PAROXYSMAL ATRIAL FLUTTER: ICD-10-CM

## 2020-01-27 DIAGNOSIS — E78.5 HYPERLIPIDEMIA, UNSPECIFIED HYPERLIPIDEMIA TYPE: Primary | ICD-10-CM

## 2020-01-27 PROCEDURE — 99213 OFFICE O/P EST LOW 20 MIN: CPT | Mod: PBBFAC | Performed by: FAMILY MEDICINE

## 2020-01-27 PROCEDURE — 99495 TRANSJ CARE MGMT MOD F2F 14D: CPT | Mod: PBBFAC | Performed by: FAMILY MEDICINE

## 2020-01-27 PROCEDURE — 99214 OFFICE O/P EST MOD 30 MIN: CPT | Mod: S$PBB,,, | Performed by: FAMILY MEDICINE

## 2020-01-27 PROCEDURE — 99999 PR PBB SHADOW E&M-EST. PATIENT-LVL III: ICD-10-PCS | Mod: PBBFAC,,, | Performed by: FAMILY MEDICINE

## 2020-01-27 PROCEDURE — 99214 PR OFFICE/OUTPT VISIT, EST, LEVL IV, 30-39 MIN: ICD-10-PCS | Mod: S$PBB,,, | Performed by: FAMILY MEDICINE

## 2020-01-27 PROCEDURE — 99999 PR PBB SHADOW E&M-EST. PATIENT-LVL III: CPT | Mod: PBBFAC,,, | Performed by: FAMILY MEDICINE

## 2020-01-27 RX ORDER — CARVEDILOL 3.12 MG/1
3.12 TABLET ORAL 2 TIMES DAILY WITH MEALS
Qty: 90 TABLET | Refills: 3 | Status: SHIPPED | OUTPATIENT
Start: 2020-01-27 | End: 2021-01-26

## 2020-01-27 RX ORDER — ATORVASTATIN CALCIUM 40 MG/1
40 TABLET, FILM COATED ORAL NIGHTLY
Qty: 90 TABLET | Refills: 4 | Status: SHIPPED | OUTPATIENT
Start: 2020-01-27

## 2020-01-27 RX ORDER — TRAZODONE HYDROCHLORIDE 50 MG/1
25-50 TABLET ORAL NIGHTLY PRN
Qty: 30 TABLET | Refills: 11 | Status: SHIPPED | OUTPATIENT
Start: 2020-01-27 | End: 2021-01-26

## 2020-01-27 RX ORDER — DILTIAZEM HYDROCHLORIDE 120 MG/1
120 CAPSULE, COATED, EXTENDED RELEASE ORAL DAILY
Qty: 90 CAPSULE | Refills: 4 | Status: SHIPPED | OUTPATIENT
Start: 2020-01-27 | End: 2020-01-27

## 2020-01-27 RX ORDER — DILTIAZEM HYDROCHLORIDE 120 MG/1
120 CAPSULE, COATED, EXTENDED RELEASE ORAL DAILY
Qty: 90 CAPSULE | Refills: 4 | Status: SHIPPED | OUTPATIENT
Start: 2020-01-27

## 2020-01-27 NOTE — PROGRESS NOTES
Subjective:       Patient ID: Baltazar Garcia is a 80 y.o. male.He     Chief Complaint: Hospital Follow Up (Pneumonia) and Medication Refill    81 yo male here for hospital follow-up of pneumonia, few pseudomonas species on sputum culture; he has completed antibiotic for pneumonia; has been able to get all medications from discharge. Improved respiration and appetite on prednisone but is having trouble sleeping. His main trouble is staying asleep. He has done this prior to being on the prednisone; takes multiple naps throughout the day. Daughter has not noted any memory changes or confusion. Has upcoming follow up with pulmonology. Still wearing oxygen at night only. No new changes in inhalers; good compliance. He is accompanied by his daughter who provides most of history; she manages his medications. Sees cardiology at [facility] and has upcoming f/u; Has had some fatigue with low BP; on diltiazem and carvedilol with h/o atrial flutter.          does not have any pertinent problems on file.  Past Medical History:   Diagnosis Date    Asthma     Diabetes mellitus     pt/states no meds and no diabetes    Hypertension     Pneumonia     Pneumonia due to Pseudomonas species 4/2/2018    Last Assessment & Plan:  Patient initially presented with suspicion of pneumonia however of note patient did not have growth from blood.  Sputum cultures with normal jenifer and pansenstive pseudomonas.  Restart viral panel was negative.  Patient's white blood cell count was elevated however patient was in steroid therapy as well, now falling. CT scan noted. Appr [facility]MS f/u.  Clinically much improved,    Tuberculosis 2002    Treated     Past Surgical History:   Procedure Laterality Date    FRACTURE SURGERY       Family History   Family history unknown: Yes     Social History     Socioeconomic History    Marital status:      Spouse name: Not on file    Number of children: Not on file    Years of education: Not on file    Highest  education level: Not on file   Occupational History    Not on file   Social Needs    Financial resource strain: Not on file    Food insecurity:     Worry: Not on file     Inability: Not on file    Transportation needs:     Medical: Not on file     Non-medical: Not on file   Tobacco Use    Smoking status: Former Smoker     Last attempt to quit:      Years since quittin.0    Smokeless tobacco: Never Used   Substance and Sexual Activity    Alcohol use: No    Drug use: No    Sexual activity: Never   Lifestyle    Physical activity:     Days per week: Not on file     Minutes per session: Not on file    Stress: Not on file   Relationships    Social connections:     Talks on phone: Not on file     Gets together: Not on file     Attends Anabaptism service: Not on file     Active member of club or organization: Not on file     Attends meetings of clubs or organizations: Not on file     Relationship status: Not on file   Other Topics Concern    Not on file   Social History Narrative    Not on file     Review of Systems   A comprehensive 14-point review of systems was reviewed with patient and was negative other than as specified above.     Objective:     Vitals:    20 1444   BP: 108/78   Pulse: 90   Temp: 97.9 °F (36.6 °C)        Physical Exam   Constitutional: He is oriented to person, place, and time. He appears well-developed and well-nourished.   HENT:   Head: Normocephalic and atraumatic.   Eyes: Pupils are equal, round, and reactive to light. EOM are normal.   Neck: Normal range of motion. Neck supple.   Cardiovascular: Normal rate and regular rhythm.   Pulmonary/Chest: Effort normal. No respiratory distress. He has wheezes. He has no rales.   Abdominal: Soft. Bowel sounds are normal.   Musculoskeletal: Normal range of motion. He exhibits no deformity.   Neurological: He is alert and oriented to person, place, and time.   Skin: Skin is warm and dry.   Psychiatric: He has a normal mood and  affect. His behavior is normal.   Nursing note and vitals reviewed.      Assessment:       1. Hyperlipidemia, unspecified hyperlipidemia type    2. Paroxysmal atrial flutter        Plan:           Problem List Items Addressed This Visit        Cardiac/Vascular    Paroxysmal atrial flutter    Relevant Medications    diltiaZEM (CARDIZEM CD) 120 MG Cp24    carvedilol (COREG) 3.125 MG tablet    Hyperlipidemia - Primary    Overview     Last Assessment & Plan:   We will continue patient on statin therapy.         Relevant Medications    atorvastatin (LIPITOR) 40 MG tablet      Will continue diltiazem and decrease dose of carvedilol. Asked to keep BP journal to bring to next cardiology appointment.

## 2020-01-30 DIAGNOSIS — J44.9 CHRONIC OBSTRUCTIVE PULMONARY DISEASE, UNSPECIFIED COPD TYPE: ICD-10-CM

## 2020-01-30 RX ORDER — FLUTICASONE FUROATE AND VILANTEROL TRIFENATATE 200; 25 UG/1; UG/1
POWDER RESPIRATORY (INHALATION)
Qty: 60 EACH | Refills: 11 | Status: SHIPPED | OUTPATIENT
Start: 2020-01-30

## 2020-02-08 DIAGNOSIS — J44.9 CHRONIC OBSTRUCTIVE PULMONARY DISEASE, UNSPECIFIED COPD TYPE: ICD-10-CM

## 2020-02-12 RX ORDER — IPRATROPIUM BROMIDE AND ALBUTEROL SULFATE 2.5; .5 MG/3ML; MG/3ML
SOLUTION RESPIRATORY (INHALATION)
Qty: 180 VIAL | Refills: 11 | Status: SHIPPED | OUTPATIENT
Start: 2020-02-12 | End: 2020-02-14

## 2020-02-13 DIAGNOSIS — J44.9 CHRONIC OBSTRUCTIVE PULMONARY DISEASE, UNSPECIFIED COPD TYPE: ICD-10-CM

## 2020-02-14 RX ORDER — IPRATROPIUM BROMIDE AND ALBUTEROL SULFATE 2.5; .5 MG/3ML; MG/3ML
SOLUTION RESPIRATORY (INHALATION)
Qty: 180 VIAL | Refills: 11 | Status: SHIPPED | OUTPATIENT
Start: 2020-02-14

## 2020-02-28 ENCOUNTER — PATIENT OUTREACH (OUTPATIENT)
Dept: ADMINISTRATIVE | Facility: OTHER | Age: 80
End: 2020-02-28

## 2020-03-04 ENCOUNTER — OFFICE VISIT (OUTPATIENT)
Dept: PULMONOLOGY | Facility: CLINIC | Age: 80
End: 2020-03-04
Payer: MEDICARE

## 2020-03-04 ENCOUNTER — PATIENT MESSAGE (OUTPATIENT)
Dept: INTERNAL MEDICINE | Facility: CLINIC | Age: 80
End: 2020-03-04

## 2020-03-04 ENCOUNTER — HOSPITAL ENCOUNTER (OUTPATIENT)
Dept: RADIOLOGY | Facility: HOSPITAL | Age: 80
Discharge: HOME | End: 2020-03-04
Attending: INTERNAL MEDICINE
Payer: MEDICARE

## 2020-03-04 VITALS
TEMPERATURE: 98 F | RESPIRATION RATE: 16 BRPM | HEIGHT: 61 IN | OXYGEN SATURATION: 98 % | WEIGHT: 118.63 LBS | BODY MASS INDEX: 22.4 KG/M2 | HEART RATE: 96 BPM

## 2020-03-04 DIAGNOSIS — J44.1 COPD EXACERBATION: ICD-10-CM

## 2020-03-04 DIAGNOSIS — J18.9 PNEUMONIA OF RIGHT UPPER LOBE DUE TO INFECTIOUS ORGANISM: Primary | ICD-10-CM

## 2020-03-04 DIAGNOSIS — R05.9 COUGH: ICD-10-CM

## 2020-03-04 DIAGNOSIS — J44.9 CHRONIC OBSTRUCTIVE PULMONARY DISEASE, UNSPECIFIED COPD TYPE: ICD-10-CM

## 2020-03-04 DIAGNOSIS — J96.21 ACUTE ON CHRONIC RESPIRATORY FAILURE WITH HYPOXIA: ICD-10-CM

## 2020-03-04 DIAGNOSIS — A31.0 MAIC (MYCOBACTERIUM AVIUM-INTRACELLULARE COMPLEX): ICD-10-CM

## 2020-03-04 PROCEDURE — 99215 OFFICE O/P EST HI 40 MIN: CPT | Mod: 25,S$PBB,, | Performed by: INTERNAL MEDICINE

## 2020-03-04 PROCEDURE — 99214 OFFICE O/P EST MOD 30 MIN: CPT | Mod: PBBFAC,25 | Performed by: INTERNAL MEDICINE

## 2020-03-04 PROCEDURE — 71046 X-RAY EXAM CHEST 2 VIEWS: CPT | Mod: 26,,, | Performed by: RADIOLOGY

## 2020-03-04 PROCEDURE — 96372 THER/PROPH/DIAG INJ SC/IM: CPT | Mod: PBBFAC

## 2020-03-04 PROCEDURE — 99999 PR PBB SHADOW E&M-EST. PATIENT-LVL IV: CPT | Mod: PBBFAC,,, | Performed by: INTERNAL MEDICINE

## 2020-03-04 PROCEDURE — 71046 X-RAY EXAM CHEST 2 VIEWS: CPT | Mod: TC

## 2020-03-04 PROCEDURE — 99999 PR PBB SHADOW E&M-EST. PATIENT-LVL IV: ICD-10-PCS | Mod: PBBFAC,,, | Performed by: INTERNAL MEDICINE

## 2020-03-04 PROCEDURE — 71046 XR CHEST PA AND LATERAL: ICD-10-PCS | Mod: 26,,, | Performed by: RADIOLOGY

## 2020-03-04 PROCEDURE — 99215 PR OFFICE/OUTPT VISIT, EST, LEVL V, 40-54 MIN: ICD-10-PCS | Mod: 25,S$PBB,, | Performed by: INTERNAL MEDICINE

## 2020-03-04 RX ORDER — CIPROFLOXACIN 750 MG/1
750 TABLET, FILM COATED ORAL 2 TIMES DAILY
Qty: 28 TABLET | Refills: 0 | Status: SHIPPED | OUTPATIENT
Start: 2020-03-04 | End: 2020-03-18

## 2020-03-04 RX ORDER — TRIAMCINOLONE ACETONIDE 40 MG/ML
80 INJECTION, SUSPENSION INTRA-ARTICULAR; INTRAMUSCULAR
Status: COMPLETED | OUTPATIENT
Start: 2020-03-04 | End: 2020-03-04

## 2020-03-04 RX ORDER — APIXABAN 5 MG/1
5 TABLET, FILM COATED ORAL 2 TIMES DAILY
Qty: 30 TABLET | Refills: 3 | Status: SHIPPED | OUTPATIENT
Start: 2020-03-04 | End: 2020-06-10

## 2020-03-04 RX ORDER — PREDNISONE 20 MG/1
TABLET ORAL
Qty: 20 TABLET | Refills: 0 | Status: SHIPPED | OUTPATIENT
Start: 2020-03-04

## 2020-03-04 RX ORDER — PROMETHAZINE HYDROCHLORIDE AND CODEINE PHOSPHATE 6.25; 1 MG/5ML; MG/5ML
5 SOLUTION ORAL EVERY 4 HOURS PRN
Qty: 240 ML | Refills: 1 | Status: SHIPPED | OUTPATIENT
Start: 2020-03-04

## 2020-03-04 RX ADMIN — TRIAMCINOLONE ACETONIDE 80 MG: 40 INJECTION, SUSPENSION INTRA-ARTICULAR; INTRAMUSCULAR at 09:03

## 2020-03-04 NOTE — PROGRESS NOTES
Subjective:       Patient ID: Baltazar Garcia is a 80 y.o. male.    Chief Complaint: COPD    HPI COPD  He presents for evaluation and treatment of COPD. The patient is currently having symptoms / an exacerbation. Current symptoms include acute dyspnea, chronic dyspnea, cough productive of green sputum in moderate amounts and wheezing. Symptoms have been present since several days ago and have been gradually worsening. He denies chills and fever. Associated symptoms include poor exercise tolerance.  This episode appears to have been triggered by pollens and upper respiratory infection. Treatments tried for the current exacerbation: albuterol nebulizer. The patient has been having similar episodes for approximately 5 years. He uses 1 pillows at night. Patient currently is on oxygen at 2 L/min per nasal cannula.. The patient is having no constitutional symptoms, denying fever, chills, anorexia, or weight loss. The patient has been hospitalized for this condition before. He has a history of many pack years. The patient is experiencing exercise intolerance (difficulty walking 10 feet on flat ground).    Past Medical History:   Diagnosis Date    Asthma     Diabetes mellitus     pt/states no meds and no diabetes    Hypertension     Pneumonia     Pneumonia due to Pseudomonas species 4/2/2018    Last Assessment & Plan:  Patient initially presented with suspicion of pneumonia however of note patient did not have growth from blood.  Sputum cultures with normal jenifer and pansenstive pseudomonas.  Restart viral panel was negative.  Patient's white blood cell count was elevated however patient was in steroid therapy as well, now falling. CT scan noted. Appr [facility]MS f/u.  Clinically much improved,    Tuberculosis 2002    Treated     Past Surgical History:   Procedure Laterality Date    FRACTURE SURGERY       Social History     Socioeconomic History    Marital status:      Spouse name: Not on file    Number of children:  Not on file    Years of education: Not on file    Highest education level: Not on file   Occupational History    Not on file   Social Needs    Financial resource strain: Not on file    Food insecurity:     Worry: Not on file     Inability: Not on file    Transportation needs:     Medical: Not on file     Non-medical: Not on file   Tobacco Use    Smoking status: Former Smoker     Last attempt to quit:      Years since quittin.1    Smokeless tobacco: Never Used   Substance and Sexual Activity    Alcohol use: No    Drug use: No    Sexual activity: Never   Lifestyle    Physical activity:     Days per week: Not on file     Minutes per session: Not on file    Stress: Not on file   Relationships    Social connections:     Talks on phone: Not on file     Gets together: Not on file     Attends [employer] service: Not on file     Active member of club or organization: Not on file     Attends meetings of clubs or organizations: Not on file     Relationship status: Not on file   Other Topics Concern    Not on file   Social History Narrative    Not on file     Review of Systems   Constitutional: Positive for fatigue. Negative for fever.   HENT: Positive for postnasal drip, rhinorrhea and congestion.    Eyes: Negative for redness and itching.   Respiratory: Positive for cough, sputum production, shortness of breath, dyspnea on extertion, use of rescue inhaler and Paroxysmal Nocturnal Dyspnea.    Cardiovascular: Negative for chest pain, palpitations and leg swelling.   Genitourinary: Negative for difficulty urinating and hematuria.   Endocrine: Negative for cold intolerance and heat intolerance.    Skin: Negative for rash.   Gastrointestinal: Negative for nausea and abdominal pain.   Neurological: Negative for dizziness, syncope, weakness and light-headedness.   Hematological: Negative for adenopathy. Does not bruise/bleed easily.   Psychiatric/Behavioral: Negative for sleep disturbance. The patient is not  "nervous/anxious.        Objective:      Pulse 96   Temp 98.1 °F (36.7 °C)   Resp 16   Ht 5' 1" (1.549 m)   Wt 53.8 kg (118 lb 9.7 oz)   SpO2 98% Comment: 3 liters  BMI 22.41 kg/m²   Physical Exam   Constitutional: He is oriented to person, place, and time. He appears well-developed and well-nourished.   HENT:   Head: Normocephalic and atraumatic.   Mouth/Throat: Oropharyngeal exudate present.   Eyes: Pupils are equal, round, and reactive to light. Conjunctivae are normal.   Neck: Neck supple. No JVD present. No tracheal deviation present. No thyromegaly present.   Cardiovascular: Normal rate, regular rhythm and normal heart sounds.   No murmur heard.  Pulmonary/Chest: Effort normal. He has decreased breath sounds. He has wheezes in the right lower field and the left lower field. He has no rhonchi. He has no rales.   Abdominal: Soft. Bowel sounds are normal.   Musculoskeletal: Normal range of motion. He exhibits no edema or tenderness.   Lymphadenopathy:     He has no cervical adenopathy.   Neurological: He is alert and oriented to person, place, and time.   Skin: Skin is warm and dry.   Nursing note and vitals reviewed.    Personal Diagnostic Review  Chest X-Ray: I personally reviewed the films and findings are:, RUL infiltrate  X-Ray Chest PA And Lateral  Narrative: EXAMINATION:  XR CHEST PA AND LATERAL    CLINICAL HISTORY:  SOB; Chronic obstructive pulmonary disease, unspecified    TECHNIQUE:  PA and lateral views of the chest were performed.    COMPARISON:  09/03/2019    FINDINGS:  Cardiac silhouette appears normal in size.  Volume loss in the left hemithorax with leftward shift of mediastinal structures is unchanged.    There appears to be interval increased density in the right lung apex which may represent superimposed infection upon underlying chronic parenchymal changes.  Clinical correlation is recommended.  Other scattered chronic parenchymal opacities remain unchanged.  Chronic blunting of the left " costophrenic angle remains unchanged.  No acute osseous findings demonstrated.  Impression: As above.  Recommend follow-up to resolution.  Further evaluation with CT may also be useful.    This report was flagged in Epic as abnormal.    Electronically signed by: Donovan Alves MD  Date:    03/04/2020  Time:    08:38    Pulmonary function tests:  na  Pulmonary Studies Review 3/4/2020 1/27/2020 1/21/2020 8/19/2019 8/5/2019 8/5/2019 5/13/2019   SpO2 98 98 97 98 97 - -   Ordering Provider - - - - - Dr Iverson -   Interpreting Provider - - - - - Dr Iverson -   Performing nurse/tech/RT - - - - - ELZA Robledo RRT -   Diagnosis - - - - - COPD -   Height 61.000 61.000 61.000 61.000 61.500 61.5 62.000   Weight 1897.72 1925.94 1922.41 1929.47 1971.79 1971.79 1982.38   BMI (Calculated) 22.4 22.8 22.7 22.8 23 23 22.7   Predicted Distance 320.02 317.77 318.33 324.71 323.59 323.59 325.27   Patient Race - - - - -  -   6MWT Status - - - - - completed without stopping -   Patient Reported - - - - - Dyspnea -   Was O2 used? - - - - - No -   6MW Distance walked (feet) - - - - - - -   Distance walked (meters) - - - - - - -   Did patient stop? - - - - - No -   Type of assistive device(s) used? - - - - - no assistive devices -   Is extra documentation required for this patient? - - - - - Yes -   Oxygen Saturation - - - - - 97 -   Supplemental Oxygen - - - - - Room Air -   Heart Rate - - - - - 62 -   Blood Pressure - - - - - 112/59 -   Rui Dyspnea Rating  - - - - - moderate -   Oxygen Saturation - - - - - 95 -   Supplemental Oxygen - - - - - Room Air -   Heart Rate - - - - - 76 -   Blood Pressure - - - - - 116/57 -   Rui Dyspnea Rating  - - - - - heavy -   Recovery Time (seconds) - - - - - 240 -   Oxygen Saturation - - - - - 99 -   Supplemental Oxygen - - - - - Room Air -   Heart Rate - - - - - 60 -   Blood Pressure - - - - - 103/56 -   Rui Dyspnea Rating  - - - - - heavy -   Is procedure ready for interpretation? - - - - - Yes -   Did the  patient stop or pause? - - - - - No -   Total Time Walked (Calculated) - - - - - 360 -   Total Laps Walked - - - - - 4 -   Final Partial Lap Distance (feet) - - - - - 0 -   Total Distance Feet (Calculated) - - - - - 800 -   Total Distance Meters (Calculated) - - - - - 243.84 -   Predicted Distance Meters (Calculated) 377.35 375.94 376.11 380.78 388.51 388.51 397.82   Percentage of Predicted (Calculated) - - - - - 62.76 -   Peak VO2 (Calculated) - - - - - 11.3 -   Mets - - - - - 3.23 -   Has The Patient Had a Previous Six Minute Walk Test? - - - - - Yes -   Oxygen Qualification? - - - - - No -     No flowsheet data found.      Assessment:       1. Pneumonia of right upper lobe due to infectious organism    2. Chronic obstructive pulmonary disease, unspecified COPD type    3. COPD exacerbation    4. Cough             Outpatient Encounter Medications as of 3/4/2020   Medication Sig Dispense Refill    albuterol (PROVENTIL/VENTOLIN HFA) 90 mcg/actuation inhaler Inhale 2 puffs into the lungs every 4 (four) hours as needed for Wheezing or Shortness of Breath. 1 Inhaler 11    albuterol-ipratropium (DUO-NEB) 2.5 mg-0.5 mg/3 mL nebulizer solution USE 1 VIAL IN NEBULIZER EVERY 4 HOURS 180 vial 11    aspirin (ECOTRIN) 81 MG EC tablet Take 81 mg by mouth.      atorvastatin (LIPITOR) 40 MG tablet Take 1 tablet (40 mg total) by mouth nightly. 90 tablet 4    BREO ELLIPTA 200-25 mcg/dose DsDv diskus inhaler INHALE 1 PUFF INTO THE LUNGS ONCE DAILY. 60 each 11    carvedilol (COREG) 3.125 MG tablet Take 1 tablet (3.125 mg total) by mouth 2 (two) times daily with meals. 90 tablet 3    dextromethorphan-guaifenesin  mg (MUCINEX DM)  mg per 12 hr tablet Take 1 tablet by mouth.      diltiaZEM (CARDIZEM CD) 120 MG Cp24 Take 1 capsule (120 mg total) by mouth once daily. 90 capsule 4    doxepin (SINEQUAN) 10 MG capsule Take 10 mg by mouth.      ELIQUIS 5 mg Tab Take 1 tablet (5 mg total) by mouth 2 (two) times daily. 30  tablet 3    hydrOXYzine pamoate (VISTARIL) 25 MG Cap Take 1 capsule (25 mg total) by mouth every 8 (eight) hours as needed (itching). 30 capsule 11    omeprazole (PRILOSEC) 20 MG capsule Take 20 mg by mouth daily as needed.      polyethylene glycol (GLYCOLAX) 17 gram/dose powder       predniSONE (DELTASONE) 20 MG tablet 60 mg/ day for 3 days, 40 mg/day for 3 days,20 mg/ day for 3 days, (1/2 tablet )10 mg a day for 3 days. 20 tablet 0    predniSONE (DELTASONE) 5 MG tablet Take 1 tablet (5 mg total) by mouth once daily. Start after high dose prednisone 30 tablet 2    sodium chloride (ALTAMIST) 0.65 % nasal spray 1 spray by Nasal route every 4 (four) hours as needed.      sulfamethoxazole-trimethoprim 800-160mg (BACTRIM DS) 800-160 mg Tab Take 1 tablet by mouth 2 (two) times daily. 60 tablet 0    tamsulosin (FLOMAX) 0.4 mg Cap Take 1 capsule (0.4 mg total) by mouth every evening. Call PCP for refills 30 capsule 11    traZODone (DESYREL) 50 MG tablet Take 0.5-1 tablets (25-50 mg total) by mouth nightly as needed for Insomnia. 30 tablet 11    [DISCONTINUED] predniSONE (DELTASONE) 20 MG tablet 60 mg/ day for 3 days, 40 mg/day for 3 days,20 mg/ day for 3 days, (1/2 tablet )10 mg a day for 3 days. 20 tablet 0    ciprofloxacin HCl (CIPRO) 750 MG tablet Take 1 tablet (750 mg total) by mouth 2 (two) times daily. for 14 days 28 tablet 0    finasteride (PROSCAR) 5 mg tablet Take 1 tablet (5 mg total) by mouth once daily. Call PCP for refills 30 tablet 0    pantoprazole (PROTONIX) 40 MG tablet Take 1 tablet (40 mg total) by mouth once daily. 30 tablet 11    promethazine-codeine 6.25-10 mg/5 ml (PHENERGAN WITH CODEINE) 6.25-10 mg/5 mL syrup Take 5 mLs by mouth every 4 (four) hours as needed for Cough. 240 mL 1     Facility-Administered Encounter Medications as of 3/4/2020   Medication Dose Route Frequency Provider Last Rate Last Dose    triamcinolone acetonide injection 80 mg  80 mg Intramuscular 1 time in  Clinic/HOD Davon Iverson MD         No orders of the defined types were placed in this encounter.    Plan:       Requested Prescriptions     Signed Prescriptions Disp Refills    ciprofloxacin HCl (CIPRO) 750 MG tablet 28 tablet 0     Sig: Take 1 tablet (750 mg total) by mouth 2 (two) times daily. for 14 days    predniSONE (DELTASONE) 20 MG tablet 20 tablet 0     Si mg/ day for 3 days, 40 mg/day for 3 days,20 mg/ day for 3 days, (1/2 tablet )10 mg a day for 3 days.    promethazine-codeine 6.25-10 mg/5 ml (PHENERGAN WITH CODEINE) 6.25-10 mg/5 mL syrup 240 mL 1     Sig: Take 5 mLs by mouth every 4 (four) hours as needed for Cough.     Pneumonia of right upper lobe due to infectious organism    Chronic obstructive pulmonary disease, unspecified COPD type  -     predniSONE (DELTASONE) 20 MG tablet; 60 mg/ day for 3 days, 40 mg/day for 3 days,20 mg/ day for 3 days, (1/2 tablet )10 mg a day for 3 days.  Dispense: 20 tablet; Refill: 0    COPD exacerbation  -     ciprofloxacin HCl (CIPRO) 750 MG tablet; Take 1 tablet (750 mg total) by mouth 2 (two) times daily. for 14 days  Dispense: 28 tablet; Refill: 0  -     predniSONE (DELTASONE) 20 MG tablet; 60 mg/ day for 3 days, 40 mg/day for 3 days,20 mg/ day for 3 days, (1/2 tablet )10 mg a day for 3 days.  Dispense: 20 tablet; Refill: 0  -     triamcinolone acetonide injection 80 mg  -     promethazine-codeine 6.25-10 mg/5 ml (PHENERGAN WITH CODEINE) 6.25-10 mg/5 mL syrup; Take 5 mLs by mouth every 4 (four) hours as needed for Cough.  Dispense: 240 mL; Refill: 1    Cough  -     promethazine-codeine 6.25-10 mg/5 ml (PHENERGAN WITH CODEINE) 6.25-10 mg/5 mL syrup; Take 5 mLs by mouth every 4 (four) hours as needed for Cough.  Dispense: 240 mL; Refill: 1      Follow up in about 4 weeks (around 2020) for Kenalog IM today, Review CXR.    MEDICAL DECISION MAKING: Moderate to high complexity.  Overall, the multiple problems listed are of moderate to high severity that may  impact quality of life and activities of daily living. Side effects of medications, treatment plan as well as options and alternatives reviewed and discussed with patient. There was counseling of patient concerning these issues.    Total time spent in face to face counseling and coordination of care - 40 minutes over 50% of time was used in discussion of prognosis, risks, benefits of treatment, instructions and compliance with regimen . Discussion with other physicians or health care providers (homehealth, durable medical equipment providers).

## 2020-03-05 PROBLEM — J47.1: Status: ACTIVE | Noted: 2019-04-29

## 2020-04-06 ENCOUNTER — TELEPHONE (OUTPATIENT)
Dept: PULMONOLOGY | Facility: CLINIC | Age: 80
End: 2020-04-06

## 2020-04-06 NOTE — TELEPHONE ENCOUNTER
----- Message from Dalila Vela sent at 4/6/2020 12:47 PM CDT -----  Contact: [employer]  Requesting a call back regarding VV. Would like to do conference call instead. Due to call quality. Please call pt back at  [phone]

## 2020-04-06 NOTE — TELEPHONE ENCOUNTER
----- Message from Dalila Vela sent at 4/6/2020 12:47 PM CDT -----  Contact: [employer]  Requesting a call back regarding VV. Would like to do conference call instead. Due to call quality. Please call pt back at  [phone]

## 2020-07-10 ENCOUNTER — TELEPHONE (OUTPATIENT)
Dept: PULMONOLOGY | Facility: CLINIC | Age: 80
End: 2020-07-10

## 2020-07-15 DIAGNOSIS — Z71.89 COMPLEX CARE COORDINATION: ICD-10-CM

## 2021-04-28 ENCOUNTER — PATIENT MESSAGE (OUTPATIENT)
Dept: RESEARCH | Facility: HOSPITAL | Age: 81
End: 2021-04-28

## 2021-05-10 ENCOUNTER — PATIENT OUTREACH (OUTPATIENT)
Dept: ADMINISTRATIVE | Facility: HOSPITAL | Age: 81
End: 2021-05-10